# Patient Record
Sex: FEMALE | Race: WHITE | Employment: OTHER | URBAN - METROPOLITAN AREA
[De-identification: names, ages, dates, MRNs, and addresses within clinical notes are randomized per-mention and may not be internally consistent; named-entity substitution may affect disease eponyms.]

---

## 2018-08-27 ENCOUNTER — TELEPHONE (OUTPATIENT)
Dept: ORTHOPEDIC SURGERY | Age: 83
End: 2018-08-27

## 2018-09-04 ENCOUNTER — OFFICE VISIT (OUTPATIENT)
Dept: ORTHOPEDIC SURGERY | Age: 83
End: 2018-09-04

## 2018-09-04 VITALS
DIASTOLIC BLOOD PRESSURE: 59 MMHG | SYSTOLIC BLOOD PRESSURE: 117 MMHG | HEIGHT: 62 IN | WEIGHT: 164 LBS | BODY MASS INDEX: 30.18 KG/M2 | HEART RATE: 60 BPM

## 2018-09-04 DIAGNOSIS — M25.571 ACUTE RIGHT ANKLE PAIN: Primary | ICD-10-CM

## 2018-09-04 PROCEDURE — 99203 OFFICE O/P NEW LOW 30 MIN: CPT | Performed by: ORTHOPAEDIC SURGERY

## 2018-09-04 NOTE — PROGRESS NOTES
Lower Extremity: Examination of the left lower extremity does not show any tenderness, deformity or injury. Range of motion is unremarkable. There is no gross instability. There are no rashes, ulcerations or lesions. Strength and tone are normal.    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 Impression:  Encounter Diagnosis   Name Primary?  Acute right ankle pain Yes       Office Procedures:  Orders Placed This Encounter   Procedures    XR ANKLE RIGHT (MIN 3 VIEWS)       Treatment Plan:  I spent 30 minutes with this patient and her  greater than 50% of time face-to-face discussing treatment options. Operative option would be very large and would involve a tibial talocalcaneal fusion. She does not want to consider surgery and her only options from my standpoint would be to use some type of custom AFO or double upright brace which can be somewhat heavy and use this just for transfers and ambulating short distances. I gave her prescription to see Brandi Thomas and I also gave her prescription for physical therapy.   Follow-up with me as needed

## 2018-09-07 ENCOUNTER — ORTHOTIC/BRACE ENCOUNTER (OUTPATIENT)
Dept: ORTHOPEDIC SURGERY | Age: 83
End: 2018-09-07

## 2018-09-21 ENCOUNTER — ORTHOTIC/BRACE ENCOUNTER (OUTPATIENT)
Dept: ORTHOPEDIC SURGERY | Age: 83
End: 2018-09-21

## 2018-09-21 DIAGNOSIS — M13.871 ALLERGIC ARTHRITIS OF RIGHT ANKLE: ICD-10-CM

## 2018-09-21 DIAGNOSIS — M25.571 ACUTE RIGHT ANKLE PAIN: Primary | ICD-10-CM

## 2018-09-21 PROCEDURE — L2820 SOFT INTERFACE BELOW KNEE SE: HCPCS | Performed by: ORTHOPAEDIC SURGERY

## 2018-09-21 PROCEDURE — L2275 PLASTIC MOD LOW EXT PAD/LINE: HCPCS | Performed by: PEDORTHIST

## 2018-09-21 PROCEDURE — L1960 AFO POS SOLID ANK PLASTIC MO: HCPCS | Performed by: PEDORTHIST

## 2018-09-21 PROCEDURE — L1970 AFO PLASTIC MOLDED W/ANKLE J: HCPCS | Performed by: ORTHOPAEDIC SURGERY

## 2018-09-21 NOTE — PROGRESS NOTES
Dispensed right brace with insert and lateral flare on her shoe. It fits and functions well. Gave wear instructions. She will follow up if having any problems.

## 2018-10-16 ENCOUNTER — OFFICE VISIT (OUTPATIENT)
Dept: ORTHOPEDIC SURGERY | Age: 83
End: 2018-10-16
Payer: MEDICARE

## 2018-10-16 VITALS — BODY MASS INDEX: 30.18 KG/M2 | HEIGHT: 62 IN | WEIGHT: 164.02 LBS

## 2018-10-16 DIAGNOSIS — M25.571 ACUTE RIGHT ANKLE PAIN: Primary | ICD-10-CM

## 2018-10-16 PROCEDURE — L1902 AFO ANKLE GAUNTLET PRE OTS: HCPCS | Performed by: ORTHOPAEDIC SURGERY

## 2018-10-16 PROCEDURE — MISC90 ORTHOTIC REFURBISHMENT 90: Performed by: ORTHOPAEDIC SURGERY

## 2018-10-16 PROCEDURE — 99212 OFFICE O/P EST SF 10 MIN: CPT | Performed by: ORTHOPAEDIC SURGERY

## 2018-11-02 ENCOUNTER — ORTHOTIC/BRACE ENCOUNTER (OUTPATIENT)
Dept: ORTHOPEDIC SURGERY | Age: 83
End: 2018-11-02

## 2018-11-06 ENCOUNTER — HOSPITAL ENCOUNTER (INPATIENT)
Age: 83
LOS: 2 days | Discharge: HOME | DRG: 379 | End: 2018-11-08
Attending: EMERGENCY MEDICINE
Payer: MEDICARE

## 2018-11-06 DIAGNOSIS — K92.2 GASTROINTESTINAL HEMORRHAGE, UNSPECIFIED GASTROINTESTINAL HEMORRHAGE TYPE: Primary | ICD-10-CM

## 2018-11-06 LAB
A/G RATIO: 1.3 (ref 1.1–2.2)
ABO/RH: NORMAL
ALBUMIN SERPL-MCNC: 3.7 G/DL (ref 3.4–5)
ALP BLD-CCNC: 103 U/L (ref 40–129)
ALT SERPL-CCNC: 31 U/L (ref 10–40)
ANION GAP SERPL CALCULATED.3IONS-SCNC: 7 MMOL/L (ref 3–16)
ANTIBODY SCREEN: NORMAL
AST SERPL-CCNC: 34 U/L (ref 15–37)
BASOPHILS ABSOLUTE: 0.1 K/UL (ref 0–0.2)
BASOPHILS RELATIVE PERCENT: 1 %
BILIRUB SERPL-MCNC: 0.4 MG/DL (ref 0–1)
BUN BLDV-MCNC: 22 MG/DL (ref 7–20)
CALCIUM SERPL-MCNC: 9.4 MG/DL (ref 8.3–10.6)
CHLORIDE BLD-SCNC: 103 MMOL/L (ref 99–110)
CO2: 31 MMOL/L (ref 21–32)
CREAT SERPL-MCNC: <0.5 MG/DL (ref 0.6–1.2)
EOSINOPHILS ABSOLUTE: 0.5 K/UL (ref 0–0.6)
EOSINOPHILS RELATIVE PERCENT: 8 %
FERRITIN: 489.9 NG/ML (ref 15–150)
FOLATE: 19.33 NG/ML (ref 4.78–24.2)
GFR AFRICAN AMERICAN: >60
GFR NON-AFRICAN AMERICAN: >60
GLOBULIN: 2.8 G/DL
GLUCOSE BLD-MCNC: 93 MG/DL (ref 70–99)
HCT VFR BLD CALC: 34.2 % (ref 36–48)
HEMOGLOBIN: 11.3 G/DL (ref 12–16)
IRON SATURATION: 94 % (ref 15–50)
IRON: 198 UG/DL (ref 37–145)
LYMPHOCYTES ABSOLUTE: 0.9 K/UL (ref 1–5.1)
LYMPHOCYTES RELATIVE PERCENT: 14.8 %
MCH RBC QN AUTO: 35.3 PG (ref 26–34)
MCHC RBC AUTO-ENTMCNC: 33.2 G/DL (ref 31–36)
MCV RBC AUTO: 106.3 FL (ref 80–100)
MONOCYTES ABSOLUTE: 0.6 K/UL (ref 0–1.3)
MONOCYTES RELATIVE PERCENT: 10.2 %
NEUTROPHILS ABSOLUTE: 4.1 K/UL (ref 1.7–7.7)
NEUTROPHILS RELATIVE PERCENT: 66 %
OCCULT BLOOD SCREENING: ABNORMAL
PDW BLD-RTO: 13.3 % (ref 12.4–15.4)
PLATELET # BLD: 191 K/UL (ref 135–450)
PMV BLD AUTO: 7.6 FL (ref 5–10.5)
POTASSIUM SERPL-SCNC: 3.9 MMOL/L (ref 3.5–5.1)
RBC # BLD: 3.21 M/UL (ref 4–5.2)
SODIUM BLD-SCNC: 141 MMOL/L (ref 136–145)
TOTAL IRON BINDING CAPACITY: 211 UG/DL (ref 260–445)
TOTAL PROTEIN: 6.5 G/DL (ref 6.4–8.2)
VITAMIN B-12: 611 PG/ML (ref 211–911)
WBC # BLD: 6.1 K/UL (ref 4–11)

## 2018-11-06 PROCEDURE — 96366 THER/PROPH/DIAG IV INF ADDON: CPT

## 2018-11-06 PROCEDURE — 80053 COMPREHEN METABOLIC PANEL: CPT

## 2018-11-06 PROCEDURE — 93010 ELECTROCARDIOGRAM REPORT: CPT | Performed by: INTERNAL MEDICINE

## 2018-11-06 PROCEDURE — 96375 TX/PRO/DX INJ NEW DRUG ADDON: CPT

## 2018-11-06 PROCEDURE — G0378 HOSPITAL OBSERVATION PER HR: HCPCS

## 2018-11-06 PROCEDURE — 86850 RBC ANTIBODY SCREEN: CPT

## 2018-11-06 PROCEDURE — 99284 EMERGENCY DEPT VISIT MOD MDM: CPT

## 2018-11-06 PROCEDURE — G0328 FECAL BLOOD SCRN IMMUNOASSAY: HCPCS

## 2018-11-06 PROCEDURE — 2060000000 HC ICU INTERMEDIATE R&B

## 2018-11-06 PROCEDURE — 83550 IRON BINDING TEST: CPT

## 2018-11-06 PROCEDURE — 85025 COMPLETE CBC W/AUTO DIFF WBC: CPT

## 2018-11-06 PROCEDURE — 6360000002 HC RX W HCPCS: Performed by: INTERNAL MEDICINE

## 2018-11-06 PROCEDURE — 82607 VITAMIN B-12: CPT

## 2018-11-06 PROCEDURE — 2580000003 HC RX 258: Performed by: INTERNAL MEDICINE

## 2018-11-06 PROCEDURE — 82728 ASSAY OF FERRITIN: CPT

## 2018-11-06 PROCEDURE — 86900 BLOOD TYPING SEROLOGIC ABO: CPT

## 2018-11-06 PROCEDURE — C9113 INJ PANTOPRAZOLE SODIUM, VIA: HCPCS | Performed by: INTERNAL MEDICINE

## 2018-11-06 PROCEDURE — 6360000002 HC RX W HCPCS: Performed by: EMERGENCY MEDICINE

## 2018-11-06 PROCEDURE — 93005 ELECTROCARDIOGRAM TRACING: CPT | Performed by: INTERNAL MEDICINE

## 2018-11-06 PROCEDURE — 86901 BLOOD TYPING SEROLOGIC RH(D): CPT

## 2018-11-06 PROCEDURE — C9113 INJ PANTOPRAZOLE SODIUM, VIA: HCPCS | Performed by: EMERGENCY MEDICINE

## 2018-11-06 PROCEDURE — 96374 THER/PROPH/DIAG INJ IV PUSH: CPT

## 2018-11-06 PROCEDURE — 96365 THER/PROPH/DIAG IV INF INIT: CPT

## 2018-11-06 PROCEDURE — 83540 ASSAY OF IRON: CPT

## 2018-11-06 PROCEDURE — 36415 COLL VENOUS BLD VENIPUNCTURE: CPT

## 2018-11-06 PROCEDURE — 82746 ASSAY OF FOLIC ACID SERUM: CPT

## 2018-11-06 RX ORDER — LEVOTHYROXINE SODIUM 0.1 MG/1
100 TABLET ORAL DAILY
COMMUNITY
End: 2019-09-18 | Stop reason: SDUPTHER

## 2018-11-06 RX ORDER — LEFLUNOMIDE 10 MG/1
20 TABLET ORAL DAILY
Status: DISCONTINUED | OUTPATIENT
Start: 2018-11-07 | End: 2018-11-08 | Stop reason: HOSPADM

## 2018-11-06 RX ORDER — ONDANSETRON 2 MG/ML
4 INJECTION INTRAMUSCULAR; INTRAVENOUS EVERY 6 HOURS PRN
Status: DISCONTINUED | OUTPATIENT
Start: 2018-11-06 | End: 2018-11-08 | Stop reason: HOSPADM

## 2018-11-06 RX ORDER — LEFLUNOMIDE 20 MG/1
20 TABLET ORAL DAILY
COMMUNITY

## 2018-11-06 RX ORDER — ATENOLOL 25 MG/1
25 TABLET ORAL DAILY
COMMUNITY
End: 2019-06-25 | Stop reason: DRUGHIGH

## 2018-11-06 RX ORDER — SULFASALAZINE 500 MG/1
500 TABLET ORAL 2 TIMES DAILY
COMMUNITY
End: 2019-06-25 | Stop reason: DRUGHIGH

## 2018-11-06 RX ORDER — LEVOTHYROXINE SODIUM 0.1 MG/1
100 TABLET ORAL DAILY
Status: DISCONTINUED | OUTPATIENT
Start: 2018-11-07 | End: 2018-11-08 | Stop reason: HOSPADM

## 2018-11-06 RX ORDER — HYDROCODONE BITARTRATE AND ACETAMINOPHEN 5; 325 MG/1; MG/1
1 TABLET ORAL 2 TIMES DAILY PRN
Status: ON HOLD | COMMUNITY
End: 2019-06-03 | Stop reason: HOSPADM

## 2018-11-06 RX ORDER — PANTOPRAZOLE SODIUM 40 MG/10ML
40 INJECTION, POWDER, LYOPHILIZED, FOR SOLUTION INTRAVENOUS ONCE
Status: COMPLETED | OUTPATIENT
Start: 2018-11-06 | End: 2018-11-06

## 2018-11-06 RX ORDER — ACETAMINOPHEN 325 MG/1
650 TABLET ORAL EVERY 4 HOURS PRN
Status: DISCONTINUED | OUTPATIENT
Start: 2018-11-06 | End: 2018-11-08 | Stop reason: HOSPADM

## 2018-11-06 RX ORDER — SODIUM CHLORIDE 0.9 % (FLUSH) 0.9 %
10 SYRINGE (ML) INJECTION PRN
Status: DISCONTINUED | OUTPATIENT
Start: 2018-11-06 | End: 2018-11-08 | Stop reason: HOSPADM

## 2018-11-06 RX ORDER — SODIUM CHLORIDE 9 MG/ML
INJECTION, SOLUTION INTRAVENOUS CONTINUOUS
Status: DISCONTINUED | OUTPATIENT
Start: 2018-11-06 | End: 2018-11-08

## 2018-11-06 RX ORDER — SODIUM CHLORIDE 0.9 % (FLUSH) 0.9 %
10 SYRINGE (ML) INJECTION EVERY 12 HOURS SCHEDULED
Status: DISCONTINUED | OUTPATIENT
Start: 2018-11-06 | End: 2018-11-08 | Stop reason: HOSPADM

## 2018-11-06 RX ORDER — OMEPRAZOLE 20 MG/1
20 CAPSULE, DELAYED RELEASE ORAL DAILY
Status: ON HOLD | COMMUNITY
End: 2018-11-08 | Stop reason: HOSPADM

## 2018-11-06 RX ORDER — HYDROCODONE BITARTRATE AND ACETAMINOPHEN 5; 325 MG/1; MG/1
1 TABLET ORAL EVERY 6 HOURS PRN
Status: DISCONTINUED | OUTPATIENT
Start: 2018-11-06 | End: 2018-11-08 | Stop reason: HOSPADM

## 2018-11-06 RX ORDER — LOSARTAN POTASSIUM 50 MG/1
50 TABLET ORAL DAILY
Status: ON HOLD | COMMUNITY
End: 2019-06-03 | Stop reason: HOSPADM

## 2018-11-06 RX ADMIN — PANTOPRAZOLE SODIUM 40 MG: 40 INJECTION, POWDER, FOR SOLUTION INTRAVENOUS at 15:14

## 2018-11-06 RX ADMIN — SODIUM CHLORIDE: 9 INJECTION, SOLUTION INTRAVENOUS at 22:14

## 2018-11-06 RX ADMIN — SODIUM CHLORIDE 8 MG/HR: 9 INJECTION, SOLUTION INTRAVENOUS at 16:00

## 2018-11-06 ASSESSMENT — ENCOUNTER SYMPTOMS
WHEEZING: 0
HEMATOCHEZIA: 1
VOICE CHANGE: 0
ABDOMINAL PAIN: 0
VOMITING: 0
TROUBLE SWALLOWING: 0
COLOR CHANGE: 0
STRIDOR: 0
FACIAL SWELLING: 0
SHORTNESS OF BREATH: 0
NAUSEA: 0

## 2018-11-06 ASSESSMENT — PAIN SCALES - GENERAL: PAINLEVEL_OUTOF10: 0

## 2018-11-07 LAB
ANION GAP SERPL CALCULATED.3IONS-SCNC: 8 MMOL/L (ref 3–16)
BASOPHILS ABSOLUTE: 0.1 K/UL (ref 0–0.2)
BASOPHILS RELATIVE PERCENT: 1 %
BUN BLDV-MCNC: 15 MG/DL (ref 7–20)
CALCIUM SERPL-MCNC: 8.4 MG/DL (ref 8.3–10.6)
CHLORIDE BLD-SCNC: 103 MMOL/L (ref 99–110)
CO2: 28 MMOL/L (ref 21–32)
CREAT SERPL-MCNC: <0.5 MG/DL (ref 0.6–1.2)
EOSINOPHILS ABSOLUTE: 0.6 K/UL (ref 0–0.6)
EOSINOPHILS RELATIVE PERCENT: 10.4 %
FIBRINOGEN: 269 MG/DL (ref 200–397)
GFR AFRICAN AMERICAN: >60
GFR NON-AFRICAN AMERICAN: >60
GLUCOSE BLD-MCNC: 91 MG/DL (ref 70–99)
HCT VFR BLD CALC: 29.5 % (ref 36–48)
HCT VFR BLD CALC: 32.9 % (ref 36–48)
HEMOGLOBIN: 11 G/DL (ref 12–16)
HEMOGLOBIN: 9.9 G/DL (ref 12–16)
LYMPHOCYTES ABSOLUTE: 1.1 K/UL (ref 1–5.1)
LYMPHOCYTES RELATIVE PERCENT: 18.7 %
MCH RBC QN AUTO: 35.8 PG (ref 26–34)
MCHC RBC AUTO-ENTMCNC: 33.5 G/DL (ref 31–36)
MCV RBC AUTO: 107.1 FL (ref 80–100)
MONOCYTES ABSOLUTE: 0.6 K/UL (ref 0–1.3)
MONOCYTES RELATIVE PERCENT: 11.1 %
NEUTROPHILS ABSOLUTE: 3.4 K/UL (ref 1.7–7.7)
NEUTROPHILS RELATIVE PERCENT: 58.8 %
PDW BLD-RTO: 13.5 % (ref 12.4–15.4)
PLATELET # BLD: 161 K/UL (ref 135–450)
PMV BLD AUTO: 7.8 FL (ref 5–10.5)
POTASSIUM REFLEX MAGNESIUM: 3.7 MMOL/L (ref 3.5–5.1)
RBC # BLD: 2.75 M/UL (ref 4–5.2)
SODIUM BLD-SCNC: 139 MMOL/L (ref 136–145)
WBC # BLD: 5.8 K/UL (ref 4–11)

## 2018-11-07 PROCEDURE — 2700000000 HC OXYGEN THERAPY PER DAY

## 2018-11-07 PROCEDURE — 7100000010 HC PHASE II RECOVERY - FIRST 15 MIN: Performed by: INTERNAL MEDICINE

## 2018-11-07 PROCEDURE — C9113 INJ PANTOPRAZOLE SODIUM, VIA: HCPCS | Performed by: INTERNAL MEDICINE

## 2018-11-07 PROCEDURE — 85384 FIBRINOGEN ACTIVITY: CPT

## 2018-11-07 PROCEDURE — 88305 TISSUE EXAM BY PATHOLOGIST: CPT

## 2018-11-07 PROCEDURE — 96366 THER/PROPH/DIAG IV INF ADDON: CPT

## 2018-11-07 PROCEDURE — 6370000000 HC RX 637 (ALT 250 FOR IP): Performed by: INTERNAL MEDICINE

## 2018-11-07 PROCEDURE — 7100000011 HC PHASE II RECOVERY - ADDTL 15 MIN: Performed by: INTERNAL MEDICINE

## 2018-11-07 PROCEDURE — 2709999900 HC NON-CHARGEABLE SUPPLY: Performed by: INTERNAL MEDICINE

## 2018-11-07 PROCEDURE — 85025 COMPLETE CBC W/AUTO DIFF WBC: CPT

## 2018-11-07 PROCEDURE — 3609017100 HC EGD: Performed by: INTERNAL MEDICINE

## 2018-11-07 PROCEDURE — 85018 HEMOGLOBIN: CPT

## 2018-11-07 PROCEDURE — G0378 HOSPITAL OBSERVATION PER HR: HCPCS

## 2018-11-07 PROCEDURE — 88342 IMHCHEM/IMCYTCHM 1ST ANTB: CPT

## 2018-11-07 PROCEDURE — 0DB78ZX EXCISION OF STOMACH, PYLORUS, VIA NATURAL OR ARTIFICIAL OPENING ENDOSCOPIC, DIAGNOSTIC: ICD-10-PCS | Performed by: INTERNAL MEDICINE

## 2018-11-07 PROCEDURE — 36415 COLL VENOUS BLD VENIPUNCTURE: CPT

## 2018-11-07 PROCEDURE — 80048 BASIC METABOLIC PNL TOTAL CA: CPT

## 2018-11-07 PROCEDURE — 6360000002 HC RX W HCPCS: Performed by: INTERNAL MEDICINE

## 2018-11-07 PROCEDURE — 2580000003 HC RX 258: Performed by: INTERNAL MEDICINE

## 2018-11-07 PROCEDURE — 85014 HEMATOCRIT: CPT

## 2018-11-07 PROCEDURE — 1200000000 HC SEMI PRIVATE

## 2018-11-07 PROCEDURE — 6370000000 HC RX 637 (ALT 250 FOR IP): Performed by: NURSE PRACTITIONER

## 2018-11-07 PROCEDURE — 99152 MOD SED SAME PHYS/QHP 5/>YRS: CPT | Performed by: INTERNAL MEDICINE

## 2018-11-07 PROCEDURE — 94761 N-INVAS EAR/PLS OXIMETRY MLT: CPT

## 2018-11-07 RX ORDER — MIDAZOLAM HYDROCHLORIDE 5 MG/ML
INJECTION INTRAMUSCULAR; INTRAVENOUS PRN
Status: DISCONTINUED | OUTPATIENT
Start: 2018-11-07 | End: 2018-11-07 | Stop reason: HOSPADM

## 2018-11-07 RX ORDER — ZOLPIDEM TARTRATE 5 MG/1
5 TABLET ORAL NIGHTLY PRN
Status: DISCONTINUED | OUTPATIENT
Start: 2018-11-07 | End: 2018-11-08 | Stop reason: HOSPADM

## 2018-11-07 RX ORDER — PANTOPRAZOLE SODIUM 40 MG/1
40 TABLET, DELAYED RELEASE ORAL
Status: DISCONTINUED | OUTPATIENT
Start: 2018-11-07 | End: 2018-11-08 | Stop reason: HOSPADM

## 2018-11-07 RX ORDER — FENTANYL CITRATE 50 UG/ML
INJECTION, SOLUTION INTRAMUSCULAR; INTRAVENOUS PRN
Status: DISCONTINUED | OUTPATIENT
Start: 2018-11-07 | End: 2018-11-07 | Stop reason: HOSPADM

## 2018-11-07 RX ADMIN — LEVOTHYROXINE SODIUM 100 MCG: 100 TABLET ORAL at 05:13

## 2018-11-07 RX ADMIN — SODIUM CHLORIDE 8 MG/HR: 9 INJECTION, SOLUTION INTRAVENOUS at 05:29

## 2018-11-07 RX ADMIN — LEFLUNOMIDE 20 MG: 10 TABLET ORAL at 10:59

## 2018-11-07 RX ADMIN — PANTOPRAZOLE SODIUM 40 MG: 40 TABLET, DELAYED RELEASE ORAL at 17:10

## 2018-11-07 RX ADMIN — ZOLPIDEM TARTRATE 5 MG: 5 TABLET ORAL at 22:42

## 2018-11-07 RX ADMIN — Medication 2 MG: at 02:01

## 2018-11-07 ASSESSMENT — PAIN SCALES - GENERAL
PAINLEVEL_OUTOF10: 0

## 2018-11-08 VITALS
SYSTOLIC BLOOD PRESSURE: 152 MMHG | HEART RATE: 63 BPM | RESPIRATION RATE: 16 BRPM | TEMPERATURE: 98.2 F | DIASTOLIC BLOOD PRESSURE: 77 MMHG | HEIGHT: 62 IN | BODY MASS INDEX: 29.77 KG/M2 | OXYGEN SATURATION: 92 % | WEIGHT: 161.8 LBS

## 2018-11-08 LAB
BASOPHILS ABSOLUTE: 0.1 K/UL (ref 0–0.2)
BASOPHILS RELATIVE PERCENT: 1.2 %
EOSINOPHILS ABSOLUTE: 0.6 K/UL (ref 0–0.6)
EOSINOPHILS RELATIVE PERCENT: 13.3 %
HCT VFR BLD CALC: 29.2 % (ref 36–48)
HEMOGLOBIN: 9.7 G/DL (ref 12–16)
LYMPHOCYTES ABSOLUTE: 1.1 K/UL (ref 1–5.1)
LYMPHOCYTES RELATIVE PERCENT: 24.3 %
MCH RBC QN AUTO: 35.6 PG (ref 26–34)
MCHC RBC AUTO-ENTMCNC: 33.1 G/DL (ref 31–36)
MCV RBC AUTO: 107.6 FL (ref 80–100)
MONOCYTES ABSOLUTE: 0.6 K/UL (ref 0–1.3)
MONOCYTES RELATIVE PERCENT: 14.3 %
NEUTROPHILS ABSOLUTE: 2 K/UL (ref 1.7–7.7)
NEUTROPHILS RELATIVE PERCENT: 46.9 %
PDW BLD-RTO: 13.7 % (ref 12.4–15.4)
PLATELET # BLD: 153 K/UL (ref 135–450)
PMV BLD AUTO: 8.1 FL (ref 5–10.5)
RBC # BLD: 2.71 M/UL (ref 4–5.2)
WBC # BLD: 4.3 K/UL (ref 4–11)

## 2018-11-08 PROCEDURE — 6370000000 HC RX 637 (ALT 250 FOR IP): Performed by: INTERNAL MEDICINE

## 2018-11-08 PROCEDURE — G0378 HOSPITAL OBSERVATION PER HR: HCPCS

## 2018-11-08 PROCEDURE — 85025 COMPLETE CBC W/AUTO DIFF WBC: CPT

## 2018-11-08 PROCEDURE — 36415 COLL VENOUS BLD VENIPUNCTURE: CPT

## 2018-11-08 RX ORDER — LOSARTAN POTASSIUM 25 MG/1
50 TABLET ORAL DAILY
Status: DISCONTINUED | OUTPATIENT
Start: 2018-11-08 | End: 2018-11-08 | Stop reason: HOSPADM

## 2018-11-08 RX ORDER — ATENOLOL 25 MG/1
25 TABLET ORAL DAILY
Status: DISCONTINUED | OUTPATIENT
Start: 2018-11-08 | End: 2018-11-08 | Stop reason: HOSPADM

## 2018-11-08 RX ORDER — PANTOPRAZOLE SODIUM 40 MG/1
40 TABLET, DELAYED RELEASE ORAL
Qty: 60 TABLET | Refills: 3 | Status: SHIPPED | OUTPATIENT
Start: 2018-11-08 | End: 2022-07-26 | Stop reason: SDUPTHER

## 2018-11-08 RX ADMIN — LEFLUNOMIDE 20 MG: 10 TABLET ORAL at 08:38

## 2018-11-08 RX ADMIN — ACETAMINOPHEN 650 MG: 325 TABLET ORAL at 12:19

## 2018-11-08 RX ADMIN — ATENOLOL 25 MG: 25 TABLET ORAL at 08:37

## 2018-11-08 RX ADMIN — LOSARTAN POTASSIUM 50 MG: 25 TABLET, FILM COATED ORAL at 12:18

## 2018-11-08 RX ADMIN — LEVOTHYROXINE SODIUM 100 MCG: 100 TABLET ORAL at 05:58

## 2018-11-08 RX ADMIN — PANTOPRAZOLE SODIUM 40 MG: 40 TABLET, DELAYED RELEASE ORAL at 05:58

## 2018-11-08 ASSESSMENT — PAIN SCALES - GENERAL
PAINLEVEL_OUTOF10: 5
PAINLEVEL_OUTOF10: 0
PAINLEVEL_OUTOF10: 5

## 2018-11-09 LAB
EKG ATRIAL RATE: 64 BPM
EKG DIAGNOSIS: NORMAL
EKG P AXIS: 56 DEGREES
EKG P-R INTERVAL: 208 MS
EKG Q-T INTERVAL: 438 MS
EKG QRS DURATION: 130 MS
EKG QTC CALCULATION (BAZETT): 451 MS
EKG R AXIS: -50 DEGREES
EKG T AXIS: 71 DEGREES
EKG VENTRICULAR RATE: 64 BPM

## 2019-01-04 ENCOUNTER — ORTHOTIC/BRACE ENCOUNTER (OUTPATIENT)
Dept: ORTHOPEDIC SURGERY | Age: 84
End: 2019-01-04

## 2019-05-27 ENCOUNTER — HOSPITAL ENCOUNTER (INPATIENT)
Age: 84
LOS: 6 days | Discharge: NURSING, REHAB OR LONG-TERM CARE | DRG: 301 | End: 2019-06-04
Attending: EMERGENCY MEDICINE | Admitting: INTERNAL MEDICINE
Payer: MEDICARE

## 2019-05-27 DIAGNOSIS — R26.2 CANNOT WALK: ICD-10-CM

## 2019-05-27 DIAGNOSIS — R79.89 ELEVATED D-DIMER: ICD-10-CM

## 2019-05-27 DIAGNOSIS — M19.90 ARTHRITIS: ICD-10-CM

## 2019-05-27 DIAGNOSIS — E07.9 THYROID DISEASE: ICD-10-CM

## 2019-05-27 DIAGNOSIS — M79.89 LEG SWELLING: Primary | ICD-10-CM

## 2019-05-27 PROBLEM — R26.9 GAIT DISTURBANCE: Status: ACTIVE | Noted: 2019-05-27

## 2019-05-27 LAB
A/G RATIO: 1.3 (ref 1.1–2.2)
ALBUMIN SERPL-MCNC: 3.7 G/DL (ref 3.4–5)
ALP BLD-CCNC: 97 U/L (ref 40–129)
ALT SERPL-CCNC: 15 U/L (ref 10–40)
ANION GAP SERPL CALCULATED.3IONS-SCNC: 10 MMOL/L (ref 3–16)
APTT: 29.9 SEC (ref 26–36)
AST SERPL-CCNC: 25 U/L (ref 15–37)
BASOPHILS ABSOLUTE: 0.1 K/UL (ref 0–0.2)
BASOPHILS RELATIVE PERCENT: 1.1 %
BILIRUB SERPL-MCNC: 0.5 MG/DL (ref 0–1)
BUN BLDV-MCNC: 17 MG/DL (ref 7–20)
CALCIUM SERPL-MCNC: 9.1 MG/DL (ref 8.3–10.6)
CHLORIDE BLD-SCNC: 103 MMOL/L (ref 99–110)
CO2: 28 MMOL/L (ref 21–32)
CREAT SERPL-MCNC: <0.5 MG/DL (ref 0.6–1.2)
D DIMER: 4075 NG/ML DDU (ref 0–229)
EOSINOPHILS ABSOLUTE: 0.4 K/UL (ref 0–0.6)
EOSINOPHILS RELATIVE PERCENT: 7.3 %
GFR AFRICAN AMERICAN: >60
GFR NON-AFRICAN AMERICAN: >60
GLOBULIN: 2.8 G/DL
GLUCOSE BLD-MCNC: 106 MG/DL (ref 70–99)
HCT VFR BLD CALC: 34.3 % (ref 36–48)
HEMOGLOBIN: 11.3 G/DL (ref 12–16)
INR BLD: 1.02 (ref 0.86–1.14)
LYMPHOCYTES ABSOLUTE: 0.7 K/UL (ref 1–5.1)
LYMPHOCYTES RELATIVE PERCENT: 11.4 %
MCH RBC QN AUTO: 35.7 PG (ref 26–34)
MCHC RBC AUTO-ENTMCNC: 33.1 G/DL (ref 31–36)
MCV RBC AUTO: 108.1 FL (ref 80–100)
MONOCYTES ABSOLUTE: 0.6 K/UL (ref 0–1.3)
MONOCYTES RELATIVE PERCENT: 10.4 %
NEUTROPHILS ABSOLUTE: 4.1 K/UL (ref 1.7–7.7)
NEUTROPHILS RELATIVE PERCENT: 69.8 %
PDW BLD-RTO: 14.2 % (ref 12.4–15.4)
PLATELET # BLD: 167 K/UL (ref 135–450)
PMV BLD AUTO: 7.3 FL (ref 5–10.5)
POTASSIUM SERPL-SCNC: 4 MMOL/L (ref 3.5–5.1)
PROTHROMBIN TIME: 11.6 SEC (ref 9.8–13)
RBC # BLD: 3.17 M/UL (ref 4–5.2)
SODIUM BLD-SCNC: 141 MMOL/L (ref 136–145)
TOTAL PROTEIN: 6.5 G/DL (ref 6.4–8.2)
WBC # BLD: 5.9 K/UL (ref 4–11)

## 2019-05-27 PROCEDURE — 80053 COMPREHEN METABOLIC PANEL: CPT

## 2019-05-27 PROCEDURE — 85610 PROTHROMBIN TIME: CPT

## 2019-05-27 PROCEDURE — 99284 EMERGENCY DEPT VISIT MOD MDM: CPT

## 2019-05-27 PROCEDURE — G0378 HOSPITAL OBSERVATION PER HR: HCPCS

## 2019-05-27 PROCEDURE — 85379 FIBRIN DEGRADATION QUANT: CPT

## 2019-05-27 PROCEDURE — 85025 COMPLETE CBC W/AUTO DIFF WBC: CPT

## 2019-05-27 PROCEDURE — 85730 THROMBOPLASTIN TIME PARTIAL: CPT

## 2019-05-27 PROCEDURE — 6370000000 HC RX 637 (ALT 250 FOR IP): Performed by: EMERGENCY MEDICINE

## 2019-05-27 RX ORDER — SODIUM CHLORIDE 0.9 % (FLUSH) 0.9 %
10 SYRINGE (ML) INJECTION PRN
Status: DISCONTINUED | OUTPATIENT
Start: 2019-05-27 | End: 2019-06-04 | Stop reason: HOSPADM

## 2019-05-27 RX ORDER — ZOLPIDEM TARTRATE 10 MG/1
10 TABLET ORAL NIGHTLY PRN
Status: ON HOLD | COMMUNITY
End: 2019-06-03 | Stop reason: HOSPADM

## 2019-05-27 RX ORDER — ONDANSETRON 2 MG/ML
4 INJECTION INTRAMUSCULAR; INTRAVENOUS EVERY 6 HOURS PRN
Status: DISCONTINUED | OUTPATIENT
Start: 2019-05-27 | End: 2019-06-04 | Stop reason: HOSPADM

## 2019-05-27 RX ORDER — ASPIRIN 81 MG/1
81 TABLET ORAL DAILY
Status: DISCONTINUED | OUTPATIENT
Start: 2019-05-28 | End: 2019-06-04 | Stop reason: HOSPADM

## 2019-05-27 RX ORDER — SODIUM CHLORIDE 0.9 % (FLUSH) 0.9 %
10 SYRINGE (ML) INJECTION EVERY 12 HOURS SCHEDULED
Status: DISCONTINUED | OUTPATIENT
Start: 2019-05-28 | End: 2019-06-04 | Stop reason: HOSPADM

## 2019-05-27 RX ORDER — PANTOPRAZOLE SODIUM 40 MG/1
40 TABLET, DELAYED RELEASE ORAL DAILY
Status: DISCONTINUED | OUTPATIENT
Start: 2019-05-28 | End: 2019-06-04 | Stop reason: HOSPADM

## 2019-05-27 RX ORDER — LEFLUNOMIDE 10 MG/1
20 TABLET ORAL DAILY
Status: DISCONTINUED | OUTPATIENT
Start: 2019-05-28 | End: 2019-06-04 | Stop reason: HOSPADM

## 2019-05-27 RX ORDER — SULFASALAZINE 500 MG/1
500 TABLET ORAL 2 TIMES DAILY
Status: DISCONTINUED | OUTPATIENT
Start: 2019-05-28 | End: 2019-06-04 | Stop reason: HOSPADM

## 2019-05-27 RX ORDER — LEVOTHYROXINE SODIUM 0.1 MG/1
100 TABLET ORAL DAILY
Status: DISCONTINUED | OUTPATIENT
Start: 2019-05-28 | End: 2019-06-04 | Stop reason: HOSPADM

## 2019-05-27 RX ORDER — ATENOLOL 25 MG/1
25 TABLET ORAL DAILY
Status: DISCONTINUED | OUTPATIENT
Start: 2019-05-28 | End: 2019-05-30

## 2019-05-27 RX ORDER — ACETAMINOPHEN 500 MG
500 TABLET ORAL EVERY 6 HOURS PRN
Status: DISCONTINUED | OUTPATIENT
Start: 2019-05-27 | End: 2019-06-04 | Stop reason: HOSPADM

## 2019-05-27 RX ORDER — HYDROCODONE BITARTRATE AND ACETAMINOPHEN 5; 325 MG/1; MG/1
1 TABLET ORAL 2 TIMES DAILY PRN
Status: DISCONTINUED | OUTPATIENT
Start: 2019-05-27 | End: 2019-06-04 | Stop reason: HOSPADM

## 2019-05-27 RX ORDER — LOSARTAN POTASSIUM 25 MG/1
50 TABLET ORAL DAILY
Status: DISCONTINUED | OUTPATIENT
Start: 2019-05-28 | End: 2019-06-04 | Stop reason: HOSPADM

## 2019-05-27 RX ADMIN — APIXABAN 10 MG: 5 TABLET, FILM COATED ORAL at 23:06

## 2019-05-27 ASSESSMENT — ENCOUNTER SYMPTOMS
DIARRHEA: 0
COUGH: 0
ABDOMINAL PAIN: 0
NAUSEA: 0
SHORTNESS OF BREATH: 0
WHEEZING: 0
BACK PAIN: 0
COLOR CHANGE: 0
VOMITING: 0

## 2019-05-27 ASSESSMENT — PAIN SCALES - GENERAL: PAINLEVEL_OUTOF10: 0

## 2019-05-27 NOTE — ED PROVIDER NOTES
11/7/2018    EGD ESOPHAGOGASTRODUODENOSCOPY performed by Gisselle Narayanan MD at [facility]       Medications:  Previous Medications    ASPIRIN 81 MG TABLET    Take 81 mg by mouth daily    ATENOLOL (TENORMIN) 25 MG TABLET    Take 25 mg by mouth daily    HYDROCODONE-ACETAMINOPHEN (NORCO) 5-325 MG PER TABLET    Take 1 tablet by mouth 2 times daily as needed for Pain. Maegan Never LEFLUNOMIDE (ARAVA) 20 MG TABLET    Take 20 mg by mouth daily    LEVOTHYROXINE (SYNTHROID) 100 MCG TABLET    Take 100 mcg by mouth Daily    LOSARTAN (COZAAR) 50 MG TABLET    Take 50 mg by mouth daily    PANTOPRAZOLE (PROTONIX) 40 MG TABLET    Take 1 tablet by mouth 2 times daily (before meals)    SULFASALAZINE (AZULFIDINE) 500 MG TABLET    Take 500 mg by mouth 2 times daily    ZOLPIDEM (AMBIEN) 10 MG TABLET    Take 10 mg by mouth nightly as needed for Sleep. Review of Systems:  Review of Systems   Constitutional: Negative for chills and fever. HENT: Negative for congestion. Respiratory: Negative for cough, shortness of breath and wheezing. Cardiovascular: Positive for leg swelling. Negative for chest pain. Patient complains of bilateral leg swelling, denies any chest pain pleuritic chest pain or shortness of breath. She has been dealing with the swelling since Saturday   Gastrointestinal: Negative for abdominal pain, diarrhea, nausea and vomiting. Genitourinary: Negative for difficulty urinating and dysuria. Musculoskeletal: Negative for back pain. Skin: Negative for color change. Neurological: Negative for weakness, numbness and headaches. Positives and Pertinent negatives as per HPI. Except as noted above in the ROS, problem specific ROS was completed and is negative. Physical Exam:  Physical Exam   Constitutional: She is oriented to person, place, and time. She appears well-developed and well-nourished. HENT:   Head: Normocephalic.    Right Ear: External ear normal.   Left Ear: External ear normal. Mouth/Throat: Oropharynx is clear and moist.   Eyes: Right eye exhibits no discharge. Left eye exhibits no discharge. No scleral icterus. Neck: Normal range of motion. Neck supple. Cardiovascular: Normal rate. Patient is from muscle and into, peripheral pulses are 2+ however there 1+ palpable and she has 2+ pitting edema from her foot up into the knee, no open wounds, erythema or warmth or signs of cellulitis in the left leg. Compartments and left leg are soft. No bony deformity or skin tenting. Pulmonary/Chest: Effort normal. No respiratory distress. Airway patent with symmetric rise and fall chest, lungs are clear anteriorly, diminished posteriorly in the bases, patient is not tachypneic or dyspneic and saturations are 95% on room air   Abdominal: Soft. There is no tenderness. Musculoskeletal: Normal range of motion. Neurological: She is alert and oriented to person, place, and time. GCS eye subscore is 4. GCS verbal subscore is 5. GCS motor subscore is 6. Skin: Skin is warm. Capillary refill takes less than 2 seconds. She is not diaphoretic. No pallor. Psychiatric: She has a normal mood and affect. Her behavior is normal.   Nursing note and vitals reviewed.       MEDICAL DECISION MAKING    Vitals:    Vitals:    05/27/19 1851 05/27/19 2129 05/27/19 2236   BP: 139/65 (!) 164/64 (!) 149/67   Pulse: 78 83    Resp: 16 16 18   Temp: 99.2 °F (37.3 °C)  98.7 °F (37.1 °C)   TempSrc: Oral  Oral   SpO2: 93% 96% 97%   Weight: 160 lb (72.6 kg)     Height: 5' 2\" (1.575 m)         LABS:  Labs Reviewed   CBC WITH AUTO DIFFERENTIAL - Abnormal; Notable for the following components:       Result Value    RBC 3.17 (*)     Hemoglobin 11.3 (*)     Hematocrit 34.3 (*)     .1 (*)     MCH 35.7 (*)     Lymphocytes # 0.7 (*)     All other components within normal limits    Narrative:     Performed at:  [address],  [place], [place]   Phone [phone] COMPREHENSIVE METABOLIC PANEL - Abnormal; Notable for the following components:    Glucose 106 (*)     CREATININE <0.5 (*)     All other components within normal limits    Narrative:     Performed at:  [address], [facility]   Phone [phone]   D-DIMER, QUANTITATIVE - Abnormal; Notable for the following components:    D-Dimer, Quant 4075 (*)     All other components within normal limits    Narrative:     Performed at:  [address], [facility] [employer] [facility]   Phone [phone]   PROTIME-INR    Narrative:     Performed at:  [address], [facility]   Phone [phone]   APTT    Narrative:     Performed at:  [address], [facility]   Phone [phone]   URINE RT REFLEX TO CULTURE        Remainder of labs reviewed and werenegative at this time or not returned at the time of this note. RADIOLOGY:   Non-plain film images such as CT, Ultrasound and MRI are read by the radiologist. Ramandeep GARCIA APRN - CNP have directly visualized the radiologic plain film image(s) with the below findings:        Interpretation per the Radiologist below, if available at the time of thisnote:    No orders to display        No results found. MEDICAL DECISION MAKING / ED COURSE:      PROCEDURES:   Procedures    None    Patient was given:  Medications   apixaban (ELIQUIS) tablet 10 mg (10 mg Oral Given 5/27/19 2306)     She's been dealing with the left leg swelling from the knee down since Saturday, denies any falls from his or injuries. Patient states that she and her family were concerned she had a blood clot.   I had a long conversation with the patient and family that they're concerned about the patient having risk of \"throwing something\" or having worsening pain and swelling secondary to severity of her leg swelling and pain. I did educate the patient and family that we would initially do blood work however she have a Doppler study performed. After evaluation and examination the patient I ordered blood work. Patient was offered pain medicine however she declined. CBC shows no significant sepsis or anemia requiring transfusion. Metabolic panel shows no blood to light disturbances or renal insufficiency. Liver functions are normal.  Coagulation studies are stable. D-dimer is 4075 concern in the left leg. However, because of the patient's leg swelling patient most likely has DVT, I have the attending physician also evaluated and discussed with the patient and family were concerned, patient is having difficulty walking with her crutches, states she started walking with her walker in the past and is unable to. We did educate the patient was admitted for 23 hour observation medicine possible that Medicare will not pay for her stay. However, the patient and family insisted the patient be admitted. Hospitalist paged for admission. She was ordered our requests for DVT prophylaxis and treatment. The attending physician spoke with the hospitalist on call, they spoke with the patient at length and hospitalist agreed to accept the patient for admission. The patient tolerated their visit well. I evaluated the patient. The physician was available for consultation as needed. The patient and / or the family were informed of the results of anytests, a time was given to answer questions, a plan was proposed and they agreed with plan. Therefore, patient will be admitted to hospital for further evaluation management of care. CLINICAL IMPRESSION:  1. Leg swelling    2. Cannot walk    3. Thyroid disease    4. Arthritis    5.  Elevated d-dimer        DISPOSITION Admitted 05/27/2019 11:05:32 PM      PATIENT REFERRED TO:  Hugo Desir MD  [facility] [address]  [facility] [address] [id number]  [phone]            DISCHARGE MEDICATIONS:  New Prescriptions    No medications on file       DISCONTINUED MEDICATIONS:  Discontinued Medications    No medications on file              (Please note the MDM and HPI sections of this note were completed with a voice recognition program.  Efforts weremade to edit the dictations but occasionally words are mis-transcribed.)    Electronically signed, RAMSES Simon CNP,         RAMSES Simon - NORA  05/27/19 5036       RAMSES Simon CNP  05/27/19 1538

## 2019-05-28 LAB
AMORPHOUS: ABNORMAL /HPF
APTT: 37.1 SEC (ref 26–36)
BACTERIA: ABNORMAL /HPF
BILIRUBIN URINE: NEGATIVE
BLOOD, URINE: ABNORMAL
CLARITY: CLEAR
COLOR: YELLOW
EPITHELIAL CELLS, UA: ABNORMAL /HPF
GLUCOSE URINE: NEGATIVE MG/DL
HCT VFR BLD CALC: 36.1 % (ref 36–48)
HEMOGLOBIN: 11.9 G/DL (ref 12–16)
KETONES, URINE: NEGATIVE MG/DL
LEUKOCYTE ESTERASE, URINE: NEGATIVE
LV EF: 58 %
LVEF MODALITY: NORMAL
MCH RBC QN AUTO: 36 PG (ref 26–34)
MCHC RBC AUTO-ENTMCNC: 33.1 G/DL (ref 31–36)
MCV RBC AUTO: 108.9 FL (ref 80–100)
MICROSCOPIC EXAMINATION: YES
NITRITE, URINE: POSITIVE
PDW BLD-RTO: 14.4 % (ref 12.4–15.4)
PH UA: 6 (ref 5–8)
PLATELET # BLD: 181 K/UL (ref 135–450)
PMV BLD AUTO: 7.3 FL (ref 5–10.5)
PROTEIN UA: NEGATIVE MG/DL
RBC # BLD: 3.31 M/UL (ref 4–5.2)
RBC UA: ABNORMAL /HPF (ref 0–2)
SPECIFIC GRAVITY UA: <=1.005 (ref 1–1.03)
URINE REFLEX TO CULTURE: YES
URINE TYPE: ABNORMAL
UROBILINOGEN, URINE: 0.2 E.U./DL
WBC # BLD: 5.8 K/UL (ref 4–11)
WBC UA: ABNORMAL /HPF (ref 0–5)

## 2019-05-28 PROCEDURE — 87186 SC STD MICRODIL/AGAR DIL: CPT

## 2019-05-28 PROCEDURE — 36415 COLL VENOUS BLD VENIPUNCTURE: CPT

## 2019-05-28 PROCEDURE — 93971 EXTREMITY STUDY: CPT

## 2019-05-28 PROCEDURE — 81001 URINALYSIS AUTO W/SCOPE: CPT

## 2019-05-28 PROCEDURE — 96365 THER/PROPH/DIAG IV INF INIT: CPT

## 2019-05-28 PROCEDURE — 97162 PT EVAL MOD COMPLEX 30 MIN: CPT

## 2019-05-28 PROCEDURE — 97166 OT EVAL MOD COMPLEX 45 MIN: CPT

## 2019-05-28 PROCEDURE — C8929 TTE W OR WO FOL WCON,DOPPLER: HCPCS

## 2019-05-28 PROCEDURE — 85730 THROMBOPLASTIN TIME PARTIAL: CPT

## 2019-05-28 PROCEDURE — 87077 CULTURE AEROBIC IDENTIFY: CPT

## 2019-05-28 PROCEDURE — G0378 HOSPITAL OBSERVATION PER HR: HCPCS

## 2019-05-28 PROCEDURE — 6360000002 HC RX W HCPCS: Performed by: INTERNAL MEDICINE

## 2019-05-28 PROCEDURE — 96366 THER/PROPH/DIAG IV INF ADDON: CPT

## 2019-05-28 PROCEDURE — 96376 TX/PRO/DX INJ SAME DRUG ADON: CPT

## 2019-05-28 PROCEDURE — 2580000003 HC RX 258: Performed by: NURSE PRACTITIONER

## 2019-05-28 PROCEDURE — 97110 THERAPEUTIC EXERCISES: CPT

## 2019-05-28 PROCEDURE — 85027 COMPLETE CBC AUTOMATED: CPT

## 2019-05-28 PROCEDURE — 97530 THERAPEUTIC ACTIVITIES: CPT

## 2019-05-28 PROCEDURE — 6360000002 HC RX W HCPCS: Performed by: NURSE PRACTITIONER

## 2019-05-28 PROCEDURE — 96372 THER/PROPH/DIAG INJ SC/IM: CPT

## 2019-05-28 PROCEDURE — 6370000000 HC RX 637 (ALT 250 FOR IP): Performed by: NURSE PRACTITIONER

## 2019-05-28 PROCEDURE — 87086 URINE CULTURE/COLONY COUNT: CPT

## 2019-05-28 PROCEDURE — 6360000004 HC RX CONTRAST MEDICATION: Performed by: INTERNAL MEDICINE

## 2019-05-28 RX ORDER — HEPARIN SODIUM 1000 [USP'U]/ML
4700 INJECTION, SOLUTION INTRAVENOUS; SUBCUTANEOUS PRN
Status: DISCONTINUED | OUTPATIENT
Start: 2019-05-29 | End: 2019-05-31 | Stop reason: ALTCHOICE

## 2019-05-28 RX ORDER — HEPARIN SODIUM 1000 [USP'U]/ML
2400 INJECTION, SOLUTION INTRAVENOUS; SUBCUTANEOUS PRN
Status: DISCONTINUED | OUTPATIENT
Start: 2019-05-29 | End: 2019-05-31 | Stop reason: ALTCHOICE

## 2019-05-28 RX ORDER — HEPARIN SODIUM 1000 [USP'U]/ML
2400 INJECTION, SOLUTION INTRAVENOUS; SUBCUTANEOUS ONCE
Status: COMPLETED | OUTPATIENT
Start: 2019-05-28 | End: 2019-05-28

## 2019-05-28 RX ORDER — ZOLPIDEM TARTRATE 5 MG/1
5 TABLET ORAL NIGHTLY PRN
Status: DISCONTINUED | OUTPATIENT
Start: 2019-05-28 | End: 2019-06-04 | Stop reason: HOSPADM

## 2019-05-28 RX ORDER — ZOLPIDEM TARTRATE 5 MG/1
5 TABLET ORAL NIGHTLY PRN
Status: DISCONTINUED | OUTPATIENT
Start: 2019-05-28 | End: 2019-05-28

## 2019-05-28 RX ORDER — HEPARIN SODIUM 10000 [USP'U]/100ML
8.3 INJECTION, SOLUTION INTRAVENOUS CONTINUOUS
Status: DISCONTINUED | OUTPATIENT
Start: 2019-05-28 | End: 2019-05-31 | Stop reason: ALTCHOICE

## 2019-05-28 RX ADMIN — ASPIRIN 81 MG: 81 TABLET, COATED ORAL at 09:35

## 2019-05-28 RX ADMIN — HEPARIN SODIUM 2400 UNITS: 1000 INJECTION INTRAVENOUS; SUBCUTANEOUS at 19:08

## 2019-05-28 RX ADMIN — PERFLUTREN 2.2 MG: 6.52 INJECTION, SUSPENSION INTRAVENOUS at 14:58

## 2019-05-28 RX ADMIN — ATENOLOL 25 MG: 25 TABLET ORAL at 09:35

## 2019-05-28 RX ADMIN — ZOLPIDEM TARTRATE 5 MG: 5 TABLET ORAL at 21:54

## 2019-05-28 RX ADMIN — LEVOTHYROXINE SODIUM 100 MCG: 100 TABLET ORAL at 06:23

## 2019-05-28 RX ADMIN — PANTOPRAZOLE SODIUM 40 MG: 40 TABLET, DELAYED RELEASE ORAL at 09:35

## 2019-05-28 RX ADMIN — SULFASALAZINE 500 MG: 500 TABLET ORAL at 09:35

## 2019-05-28 RX ADMIN — LEFLUNOMIDE 20 MG: 10 TABLET ORAL at 09:35

## 2019-05-28 RX ADMIN — HEPARIN SODIUM AND DEXTROSE 10.6 UNITS/HR: 10000; 5 INJECTION INTRAVENOUS at 19:07

## 2019-05-28 RX ADMIN — SULFASALAZINE 500 MG: 500 TABLET ORAL at 21:53

## 2019-05-28 RX ADMIN — SULFASALAZINE 500 MG: 500 TABLET ORAL at 00:30

## 2019-05-28 RX ADMIN — LOSARTAN POTASSIUM 50 MG: 25 TABLET, FILM COATED ORAL at 09:35

## 2019-05-28 RX ADMIN — ZOLPIDEM TARTRATE 5 MG: 5 TABLET ORAL at 03:20

## 2019-05-28 RX ADMIN — ENOXAPARIN SODIUM 40 MG: 40 INJECTION SUBCUTANEOUS at 09:36

## 2019-05-28 RX ADMIN — SODIUM CHLORIDE, PRESERVATIVE FREE 10 ML: 5 INJECTION INTRAVENOUS at 09:36

## 2019-05-28 ASSESSMENT — PAIN SCALES - GENERAL
PAINLEVEL_OUTOF10: 0
PAINLEVEL_OUTOF10: 0

## 2019-05-28 NOTE — H&P
Hospital Medicine History & Physical      PCP: Giles Arellano MD    Date of Admission: 5/27/2019    Date of Service: Pt seen/examined on 5/27/19 and Placed in Observation. Chief Complaint:  Left leg swelling      History Of Present Illness:   80 y.o. female who presented to [facility] with PMH: Hypertension, hyperlipidemia, thyroid disease, CAD, arthritis. Patient has swelling to her left leg, that started on Saturday after a 4 hour car ride. The area is greater in size to the right leg. Patient is using walker, but states she cannot stand now. The left lower extremity area feels \"sore\". The leg does not appear to be cellulitis. There is no increased warmth, or erythema. Past Medical History:          Diagnosis Date    Arthritis     CAD (coronary artery disease)     GERD (gastroesophageal reflux disease)     Hyperlipidemia     Hypertension     Thyroid disease        Past Surgical History:          Procedure Laterality Date    CHOLECYSTECTOMY      CORONARY ANGIOPLASTY WITH STENT PLACEMENT      JOINT REPLACEMENT      \"knees and hips\"    FL ESOPHAGOGASTRODUODENOSCOPY TRANSORAL DIAGNOSTIC N/A 11/7/2018    EGD ESOPHAGOGASTRODUODENOSCOPY performed by Latonya Torres MD at [address]       Medications Prior to Admission:      Prior to Admission medications    Medication Sig Start Date End Date Taking? Authorizing Provider   zolpidem (AMBIEN) 10 MG tablet Take 10 mg by mouth nightly as needed for Sleep.    Yes Historical Provider, MD   pantoprazole (PROTONIX) 40 MG tablet Take 1 tablet by mouth 2 times daily (before meals)  Patient taking differently: Take 40 mg by mouth daily  11/8/18  Yes Susana Duffy MD   sulfaSALAzine (AZULFIDINE) 500 MG tablet Take 500 mg by mouth 2 times daily   Yes Historical Provider, MD   leflunomide (ARAVA) 20 MG tablet Take 20 mg by mouth daily   Yes Historical Provider, MD   aspirin 81 MG tablet Take 81 mg by mouth daily   Yes Historical Provider, MD   levothyroxine (SYNTHROID) 100 MCG tablet Take 100 mcg by mouth Daily   Yes Historical Provider, MD   losartan (COZAAR) 50 MG tablet Take 50 mg by mouth daily   Yes Historical Provider, MD   atenolol (TENORMIN) 25 MG tablet Take 25 mg by mouth daily   Yes Historical Provider, MD   HYDROcodone-acetaminophen (NORCO) 5-325 MG per tablet Take 1 tablet by mouth 2 times daily as needed for Pain. .   Yes Historical Provider, MD       Allergies:  Adhesive tape and Amlodipine    Social History:      The patient currently lives with     TOBACCO:   reports that she has never smoked. She has never used smokeless tobacco.  ETOH:   reports that she does not drink alcohol. Family History:      Reviewed in detail. Positive as follows:    History reviewed. No pertinent family history. REVIEW OF SYSTEMS:   Pertinent positives as noted in the HPI. All other systems reviewed and negative. PHYSICAL EXAM PERFORMED:    BP (!) 150/69   Pulse 77   Temp 98.4 °F (36.9 °C) (Oral)   Resp 16   Ht 5' 2\" (1.575 m)   Wt 160 lb (72.6 kg)   SpO2 94%   BMI 29.26 kg/m²     General appearance:  No apparent distress, appears stated age and cooperative. HEENT:  Normal cephalic, atraumatic without obvious deformity. Pupils equal, round, and reactive to light. Extra ocular muscles intact. Conjunctivae/corneas clear. Neck: Supple, with full range of motion. No jugular venous distention. Trachea midline. Respiratory:  Normal respiratory effort. dim to auscultation, bilaterally without Rales/Wheezes/Rhonchi. Cardiovascular:  Regular rate and rhythm with normal S1/S2 without murmurs, rubs or gallops. Abdomen: Soft, non-tender, non-distended with normal bowel sounds. Musculoskeletal:  No clubbing, cyanosis. 2+ left lower extremity edema. dec range of motion with deformity to right foot. Skin: Skin color, texture, turgor normal.  No rashes or lesions.   Neurologic:  Neurovascularly intact without any focal sensory/motor deficits. Cranial nerves: II-XII intact, grossly non-focal.  Psychiatric:  Alert and oriented, thought content appropriate, normal insight  Capillary Refill: Brisk,< 3 seconds   Peripheral Pulses: +2 palpable, equal bilaterally       Labs:     Recent Labs     05/27/19 1946   WBC 5.9   HGB 11.3*   HCT 34.3*        Recent Labs     05/27/19 1946      K 4.0      CO2 28   BUN 17   CREATININE <0.5*   CALCIUM 9.1     Recent Labs     05/27/19 1946   AST 25   ALT 15   BILITOT 0.5   ALKPHOS 97     Recent Labs     05/27/19 1946   INR 1.02     No results for input(s): Maribell Winter in the last 72 hours. Urinalysis:      Lab Results   Component Value Date    NITRU Negative 09/08/2016    BLOODU Negative 09/08/2016    SPECGRAV 1.020 09/08/2016    GLUCOSEU Negative 09/08/2016       Radiology:     CXR: I have reviewed the CXR with the following interpretation: none  EKG:  I have reviewed the EKG with the following interpretation: none    VL DUP LOWER EXTREMITY VENOUS LEFT    (Results Pending)       ASSESSMENT:    Active Hospital Problems    Diagnosis Date Noted    Gait disturbance [R26.9] 05/27/2019    Hypertension [I10]     Hyperlipidemia [E78.5]          PLAN:  Gait disturbance, possibly from DVT to left lower extremity  1 dose of Eliquis given in the emergency room  Ultrasound of left lower extremity pending  Echo in am  HTN  BPs have been controlled  Continue current anti hypertensive regimen, titrate dose as indicated  Reinforced and educated patient about weight loss/ lifestyle changes/ aerobic exercise/ low sodium and DASH diet     DVT Prophylaxis: Lovenox  Diet: Cardiac/low sodium  Code Status: Full code    PT/OT Eval Status: Not ordered    Dispo - less than [address], APRN - CNP    Thank you Rodney Pettit MD for the opportunity to be involved in this patient's care.  If you have any questions or concerns please feel free to contact me at [phone]. No

## 2019-05-28 NOTE — ED PROVIDER NOTES
Emergency Department Provider Note     Location: [facility]  ED  5/27/2019    I independently performed a history and physical on Baron Newell. All diagnostic, treatment, and disposition decisions were made by myself in conjunction with the mid-level provider. Briefly, this is a 80 y.o. female here for left leg swelling and inability to walk that started on Saturday. Patient denies pain. She also denies trauma. Family thought the lower leg appears slightly red. No chest pain or SOB. No fever. Patient has rapid functional decline since and she could not walk today and several family member had to help lift the patient into the car to get her here today. ED Triage Vitals [05/27/19 1851]   BP Temp Temp Source Pulse Resp SpO2 Height Weight   139/65 99.2 °F (37.3 °C) Oral 78 16 93 % 5' 2\" (1.575 m) 160 lb (72.6 kg)        Exam showed WDWN female NAD, awake, alert. Patient wears leg brace and has special orthotic shoes. Left lower leg swollen compared to the right. Slight erythema but not significantly warm (appears to be more consistent with venous stasis rather than cellulitis). Patient seen and examined in room 22. Lab - d-dimer 4075. Anemia better than baseline. Plt count normal. No elevated WBC. CMP unremarkable. There is no vascular ultrasound in the evening. Initial plan was anitcoagulation and bring the patient back in the morning for duplex ultrasound. However, patient truly struggled to walk so there's concern for safety at home and also how family is going to get her home and then back. She may benefit from PT/OT eval regardless if she has DVT. Pt and family understand this will be observation admission but we're all in agreement that this is probably the best option given that patient is struggling to ambulate.       I spoke with Marilyn Marquez NP for hospitalist. We thoroughly discussed the history, physical exam, laboratory and imaging studies, as well as, emergency department course. Based upon that discussion, we've decided to admit Pilar Mcqueen for further observation and evaluation of Pradip Gold's left leg swelling and inability to walk. As I have deemed necessary from their history, physical, and studies, I have considered and evaluated Pilar Mcqueen for the following diagnoses: DEEP SPACE INFECTIONS, DEEP VENOUS THROMBOSIS, LEO'S GANGRENE, SEPSIS, and TOXIC SHOCK SYNDROME. FINAL IMPRESSION  1. Leg swelling    2. Cannot walk        Vitals:  Blood pressure (!) 149/67, pulse 83, temperature 98.7 °F (37.1 °C), temperature source Oral, resp. rate 18, height 5' 2\" (1.575 m), weight 160 lb (72.6 kg), SpO2 97 %. For further details of [facility] emergency department encounter, please see Fbaricio Champagne NP's documentation. This chart was generated in part by using Dragon Dictation system and may contain errors related to that system including errors in grammar, punctuation, and spelling, as well as words and phrases that may be inappropriate. If there are any questions or concerns please feel free to contact the dictating provider for clarification.           Liliana Zafar MD  05/27/19 [address] Niels Holter, MD  05/27/19 0732

## 2019-05-28 NOTE — PROGRESS NOTES
Occupational Therapy   Occupational Therapy Initial Assessment  Date: 2019   Patient Name: Gustavo Jones  MRN: [id number]     : 1927    Date of Service: 2019    Discharge Recommendations:  Continue to assess pending progress(with safe mobility  and standing ADL's)       Assessment   Performance deficits / Impairments: Decreased functional mobility ; Decreased ADL status; Decreased balance  Patient Education: role of OT, safety, importance of being up OOB for meals, toileting  REQUIRES OT FOLLOW UP: Yes  Activity Tolerance  Activity Tolerance: Patient Tolerated treatment well  Safety Devices  Safety Devices in place: Yes  Type of devices: Call light within reach; Chair alarm in place; Left in chair;Nurse notified           Patient Diagnosis(es): The primary encounter diagnosis was Leg swelling. Diagnoses of Cannot walk, Thyroid disease, Arthritis, and Elevated d-dimer were also pertinent to this visit. has a past medical history of Arthritis, CAD (coronary artery disease), GERD (gastroesophageal reflux disease), Hyperlipidemia, Hypertension, and Thyroid disease. has a past surgical history that includes joint replacement; Cholecystectomy; Coronary angioplasty with stent; and pr esophagogastroduodenoscopy transoral diagnostic (N/A, 2018). Restrictions  Restrictions/Precautions  Restrictions/Precautions: Fall Risk, General Precautions  Required Braces or Orthoses?: Yes(orthopedic shoes)  Required Braces or Orthoses  Right Lower Extremity Brace:  Ankle Foot Orthotics  Position Activity Restriction  Other position/activity restrictions: High fall risk; up with assist    Subjective   General  Chart Reviewed: Yes  Patient assessed for rehabilitation services?: Yes  Family / Caregiver Present: No  Referring Practitioner: RAMSES Jarvis, CNP  Diagnosis: gait disturbance, Left LE edema  Subjective  Subjective: \"I thought I was going to be able to go home today\"  General Comment  Comments: RN cleared pt for OT; pt resting in bed, requiring mod encouragement to get up OOB to chair      Social/Functional History  Social/Functional History  Lives With: Spouse  Type of Home: House  Home Layout: (stair lift to 2nd floor)  Home Access: Stairs to enter with rails(5, uses crutch on one side, rail on other)  Entrance Stairs - Number of Steps: Has stair lift to 2nd floor inside home  Bathroom Shower/Tub: Walk-in shower  Bathroom Toilet: Standard  Bathroom Equipment: Grab bars in [address] [place]: Crutches, [employer]  ADL Assistance: Needs assistance(spouse assists with bathing and lower body dressing)  Ambulation Assistance: Independent(axillary crutches)  Transfer Assistance: Independent  Additional Comments: uses orthopedic shoe LLE, AFO with orthopedic shoe RLE       Objective        Orientation  Overall Orientation Status: Within Functional Limits     Balance  Sitting Balance: Supervision  Standing Balance: Contact guard assistance(with cruz.  axillary crutches)  Standing Balance  Activity: bed-->chair to RIght  ADL  Feeding: Setup(for lids/pkgs)  Grooming: Setup(seated in chair to wash face & hands)  LE Dressing: Maximum assistance(for socks & Right AFO, unable to tegan Left orthopedic shoe in bed, d/t increased edema)  Toileting: Unable to assess(comment)(declined walking to bathroom )    RUE Tone: Normotonic  LUE Tone: Normotonic    Coordination  Movements Are Fluid And Coordinated: No  Coordination and Movement description: Gross motor impairments;Right UE;Left UE     Bed mobility  Supine to Sit: Stand by assistance(to right)  Sit to Supine: Unable to assess(Left up in chair upon exiting)  Transfers  Sit to stand: Contact guard assistance  Stand to sit: Contact guard assistance     Cognition  Overall Cognitive Status: WFL    LUE General AROM: limited for finger extension & limited to 90* shoulder flexion  RUE General AROM: limited for finger extension & limited to 90* shoulder flexion     Plan   Plan  Times per week: 3-5x/ week   Current Treatment Recommendations: Balance Training, Functional Mobility Training, Safety Education & Training           AM-PAC Score        AM-PAC Inpatient Daily Activity Raw Score: 12  AM-PAC Inpatient ADL T-Scale Score : 30.6  ADL Inpatient CMS 0-100% Score: 66.57  ADL Inpatient CMS G-Code Modifier : CL    Goals  Short term goals  Time Frame for Short term goals: 3-5 days  Short term goal 1: supervision with bathroom mobility by 6-02-19  Short term goal 2: supervision standing ADL's for 5 minutes   Short term goal 3: independent with toileting  Patient Goals   Patient goals : go home       Therapy Time   Individual Concurrent Group Co-treatment   Time In [address]         Time Out 1330         Minutes [address], [place]

## 2019-05-28 NOTE — PROGRESS NOTES
Vascular    Received consult for RLE DVT. Currently on anticoagulation.   Recc Elevate RLE higher than heart level with strict bedrest.   Patient will be seen in am.

## 2019-05-28 NOTE — PROGRESS NOTES
Hospitalist Progress Note      PCP: Antonio Miller MD    Date of Admission: 5/27/2019      Chief Complaint:  Left leg swelling      Hospital Course:       Subjective: notes ongoing LLE swelling, daughter at bedside       Medications:  Reviewed    Infusion Medications   Scheduled Medications    enoxaparin  40 mg Subcutaneous Daily    aspirin  81 mg Oral Daily    atenolol  25 mg Oral Daily    leflunomide  20 mg Oral Daily    levothyroxine  100 mcg Oral Daily    losartan  50 mg Oral Daily    pantoprazole  40 mg Oral Daily    sulfaSALAzine  500 mg Oral BID    sodium chloride flush  10 mL Intravenous 2 times per day     PRN Meds: zolpidem, HYDROcodone-acetaminophen, sodium chloride flush, magnesium hydroxide, ondansetron, acetaminophen      Intake/Output Summary (Last 24 hours) at 5/28/2019 1516  Last data filed at 5/28/2019 0929  Gross per 24 hour   Intake 340 ml   Output --   Net 340 ml       Physical Exam Performed:    /77   Pulse 80   Temp 98 °F (36.7 °C) (Oral)   Resp 16   Ht 5' 2\" (1.575 m)   Wt 160 lb (72.6 kg)   SpO2 96%   BMI 29.26 kg/m²        General appearance:  No apparent distress, appears stated age and cooperative. HEENT:  Normal cephalic, atraumatic without obvious deformity. Pupils equal, round, and reactive to light. Extra ocular muscles intact. Conjunctivae/corneas clear. Neck: Supple, with full range of motion. No jugular venous distention. Trachea midline. Respiratory:  Normal respiratory effort. dim to auscultation, bilaterally without Rales/Wheezes/Rhonchi. Cardiovascular:  Regular rate and rhythm with normal S1/S2 without murmurs, rubs or gallops. Abdomen: Soft, non-tender, non-distended with normal bowel sounds. Musculoskeletal:  No clubbing, cyanosis. 2+ left lower extremity edema. dec range of motion with deformity to right foot. Rheumatic joints noted  Skin: Skin color, texture, turgor normal.  No rashes or lesions.   Neurologic:  Neurovascularly

## 2019-05-28 NOTE — PROGRESS NOTES
Physical Therapy    Facility/Department: [facility] C5 - MED SURG/ORTHO  Initial Assessment    NAME: Chris Talavera  : 1927  MRN: [id number]    Date of Service: 2019    Discharge Recommendations:  Continue to assess pending progress, still awaiting tests and LLE too swollen to don orthopedic shoe for amb        Assessment   Body structures, Functions, Activity limitations: Decreased functional mobility ; Decreased endurance;Decreased strength;Decreased ROM  Assessment: 81 yo female adm with Gait disturbance, LLE edema. PLOF Assist for ADLS, amb indep with crutches, AFO, orthopedic shoes. Today AM PAC score of 16 indicates pt would benefit from skilled PT to address current deficits. Will cont to assess discharge recs pending gait eval. Still waiting on further testing and LLE too swollen for shoe today  Treatment Diagnosis: decreased gait and mobility  Specific instructions for Next Treatment: ex, mobility  Prognosis: Good  Decision Making: Medium Complexity  Patient Education: role of PT,s afety  Barriers to Learning: pt voices understanding  REQUIRES PT FOLLOW UP: Yes  Activity Tolerance  Activity Tolerance: Patient Tolerated treatment well       Patient Diagnosis(es): The primary encounter diagnosis was Leg swelling. Diagnoses of Cannot walk, Thyroid disease, Arthritis, and Elevated d-dimer were also pertinent to this visit. has a past medical history of Arthritis, CAD (coronary artery disease), GERD (gastroesophageal reflux disease), Hyperlipidemia, Hypertension, and Thyroid disease. has a past surgical history that includes joint replacement; Cholecystectomy; Coronary angioplasty with stent; and pr esophagogastroduodenoscopy transoral diagnostic (N/A, 2018).     Restrictions  Restrictions/Precautions  Restrictions/Precautions: Fall Risk, General Precautions  Position Activity Restriction  Other position/activity restrictions: up with assist  Vision/Hearing  Vision: Impaired  Vision Exceptions: (I wear my glasses sometimes)  Hearing: Within functional limits     Subjective  General  Chart Reviewed: Yes  Referring Practitioner: Valdo Lira CNP  Referral Date : 05/27/19  Diagnosis: Gait disturbance  Pain Screening  Patient Currently in Pain: Denies          Orientation  Orientation  Overall Orientation Status: Within Normal Limits  Social/Functional History  Social/Functional History  Lives With: Spouse  Type of Home: House  Home Layout: Two level  Home Access: Stairs to enter with rails(5, uses crutch on one side, rail on other)  Entrance Stairs - Number of Steps: Has stair lift to 2nd floor inside home  Bathroom Shower/Tub: Walk-in shower  Bathroom Toilet: Standard  Bathroom Equipment: Grab bars in shower  Home Equipment: Crutches, [employer]  ADL Assistance: Needs assistance(spouse assists with bathing and lower body dressing)  Ambulation Assistance: Independent(axillary crutches)  Transfer Assistance: Independent  Additional Comments: uses orthopedic shoe LLE, AFO with orthopedic shoe RLE    Objective   AROM BLE WFL at hips and knees  Strength: Grossly 2+ to 3-/5 at hips, 4-/5 knee ext      Bed mobility  Supine to Sit: Stand by assistance  Sit to Supine: Stand by assistance  Scooting: Stand by assistance  Transfers  Sit to Stand: Minimal Assistance  Stand to sit: Minimal Assistance  Ambulation  Ambulation?: No(LLE too swollen to don shoe)        Exercises  Heelslides: 5x B  Gluteal Sets: 5 x B  Hip Abduction: 5 x B  Ankle Pumps: 10 x B     Plan   Plan  Times per week: 3-5 x wk  Specific instructions for Next Treatment: ex, mobility  Current Treatment Recommendations: Strengthening, Gait Training, Stair training, Balance Training, Functional Mobility Training, Transfer Training, Safety Education & Training  Safety Devices  Type of devices:  All fall risk precautions in place, Left in bed, Call light within reach, Gait belt, Patient at risk for falls, Nurse notified, Bed alarm in place AM-PAC Score  AM-PAC Inpatient Mobility Raw Score : 16  AM-PAC Inpatient T-Scale Score : 40.78  Mobility Inpatient CMS 0-100% Score: 54.16  Mobility Inpatient CMS G-Code Modifier : CK          Goals  Short term goals  Time Frame for Short term goals: 1 week(6/4) unless otherwise specified  Short term goal 1: pt to be modified indep with bed mob  Short term goal 2: pt to be CG for transfers  Short term goal 3: pt to amb with crutches, AFO, orthopedic shoes and  ft  Short term goal 4: pt to participate in 10 reps LE ex by 6/1  Patient Goals   Patient goals : \"To walk\"       Therapy Time   Individual Concurrent Group Co-treatment   Time In 1010         Time Out 5671         Minutes 22               If pt discharges prior to next PT session this note will serve as discharge summary.   Manny De La Cruz, PT

## 2019-05-28 NOTE — CONSULTS
Pharmacy to Manage Heparin Infusion per [facility]    Dx: RLE DVT  Pt wt = 59kg (adjusted body weight  Baseline aPTT = 37.1sec at 1739    High Dose Heparin Infusion  Heparin 80 units/kg IVP bolus followed by Heparin infusion at 18 units/kg/hr. Recheck aPTT in 6 hours. Goal aPTT = 54-90 seconds. Give 2400unit heparin bolus and start infusion at 10.6ml/hr  Next aPTT blanquita Eagle PharmD 5/28/2019 6:56 PM     5/29 0112  Aptt = 115.4  Hold heparin for 1 hour and restart at 7.1 ml/hr  Next aPTT 0800  Nimo Jones PharmD 1:51 AM 5/29/19    aPTT = 51.5 seconds at 1512  Heparin 2400 units IVP bolus then increase Heparin infusion to 8.3 mL/hr. Recheck aPTT tonight at 2200. Rain Mondragon PharmD, BCPS   5/29/2019 4:07 PM    5/29 2228  aptt = 76.1 sec  Continue current rate. Next aptt 0430  Bria Enriquez Pharm D.5/29/2019 11:35 PM    5/30 0420  aptt = 67.1 sec. Continue current rate. Next aptt daily. Bria Enriquez Pharm D.5/30/2019 5:06 AM    aPTT = 64.5 seconds at 0706  No change to Heparin. Continue Heparin infusion at 8.3 mL/hr. Recheck aPTT tomorrow AM.  Rain Mondragon PharmD, BCPS   5/31/2019 9:48 AM

## 2019-05-28 NOTE — PROGRESS NOTES
4 Eyes Skin Assessment     The patient is being assess for   Admission    I agree that 2 RN's have performed a thorough Head to Toe Skin Assessment on the patient. ALL assessment sites listed below have been assessed. Areas assessed by both nurses:   [x]   Head, Face, and Ears   [x]   Shoulders, Back, and Chest, Abdomen  [x]   Arms, Elbows, and Hands   [x]   Coccyx, Sacrum, and Ischium  [x]   Legs, Feet, and Heels            **SHARE this note so that the co-signing nurse is able to place an eSignature**    Co-signer eSignature: Electronically signed by Andreas Guillen RN on 6/14/32 at 12:59 AM    Does the Patient have Skin Breakdown?   No          Yoav Prevention initiated:  No   Wound Care Orders initiated:  No      [facility] nurse consulted for Pressure Injury (Stage 3,4, Unstageable, DTI, NWPT, Complex wounds)and New or Established Ostomies:  No      Primary Nurse eSignature: Electronically signed by Joseph Kemp RN on 5/28/19 at 12:58 AM

## 2019-05-29 PROBLEM — I82.412 DVT OF DEEP FEMORAL VEIN, LEFT (HCC): Status: ACTIVE | Noted: 2019-05-29

## 2019-05-29 LAB
APTT: 115.4 SEC (ref 26–36)
APTT: 51.5 SEC (ref 26–36)
APTT: 68.1 SEC (ref 26–36)
APTT: 76.1 SEC (ref 26–36)

## 2019-05-29 PROCEDURE — 6360000002 HC RX W HCPCS: Performed by: INTERNAL MEDICINE

## 2019-05-29 PROCEDURE — 96366 THER/PROPH/DIAG IV INF ADDON: CPT

## 2019-05-29 PROCEDURE — 36415 COLL VENOUS BLD VENIPUNCTURE: CPT

## 2019-05-29 PROCEDURE — 6370000000 HC RX 637 (ALT 250 FOR IP): Performed by: NURSE PRACTITIONER

## 2019-05-29 PROCEDURE — 2580000003 HC RX 258: Performed by: NURSE PRACTITIONER

## 2019-05-29 PROCEDURE — 99221 1ST HOSP IP/OBS SF/LOW 40: CPT | Performed by: SURGERY

## 2019-05-29 PROCEDURE — 1200000000 HC SEMI PRIVATE

## 2019-05-29 PROCEDURE — 94669 MECHANICAL CHEST WALL OSCILL: CPT

## 2019-05-29 PROCEDURE — 85730 THROMBOPLASTIN TIME PARTIAL: CPT

## 2019-05-29 RX ORDER — HEPARIN SODIUM 1000 [USP'U]/ML
2400 INJECTION, SOLUTION INTRAVENOUS; SUBCUTANEOUS ONCE
Status: COMPLETED | OUTPATIENT
Start: 2019-05-29 | End: 2019-05-29

## 2019-05-29 RX ADMIN — PANTOPRAZOLE SODIUM 40 MG: 40 TABLET, DELAYED RELEASE ORAL at 08:23

## 2019-05-29 RX ADMIN — HEPARIN SODIUM AND DEXTROSE 8.3 ML/HR: 10000; 5 INJECTION INTRAVENOUS at 22:29

## 2019-05-29 RX ADMIN — SULFASALAZINE 500 MG: 500 TABLET ORAL at 22:28

## 2019-05-29 RX ADMIN — LEVOTHYROXINE SODIUM 100 MCG: 100 TABLET ORAL at 06:04

## 2019-05-29 RX ADMIN — SODIUM CHLORIDE, PRESERVATIVE FREE 10 ML: 5 INJECTION INTRAVENOUS at 08:23

## 2019-05-29 RX ADMIN — ZOLPIDEM TARTRATE 5 MG: 5 TABLET ORAL at 22:28

## 2019-05-29 RX ADMIN — ATENOLOL 25 MG: 25 TABLET ORAL at 08:23

## 2019-05-29 RX ADMIN — LOSARTAN POTASSIUM 50 MG: 25 TABLET, FILM COATED ORAL at 08:22

## 2019-05-29 RX ADMIN — ASPIRIN 81 MG: 81 TABLET, COATED ORAL at 08:23

## 2019-05-29 RX ADMIN — HEPARIN SODIUM 2400 UNITS: 1000 INJECTION INTRAVENOUS; SUBCUTANEOUS at 16:44

## 2019-05-29 RX ADMIN — LEFLUNOMIDE 20 MG: 10 TABLET ORAL at 08:22

## 2019-05-29 RX ADMIN — SULFASALAZINE 500 MG: 500 TABLET ORAL at 08:23

## 2019-05-29 ASSESSMENT — PAIN SCALES - GENERAL
PAINLEVEL_OUTOF10: 0
PAINLEVEL_OUTOF10: 0

## 2019-05-29 NOTE — PROGRESS NOTES
Vascular    Full consult to follow. LLE DVT in 81 yo. Reports leg feels better today. Recc:  Strict Bedrest with elevation above Heart. Anticoagulation and Compression. At advanced age high risk for complications with lytics and lying prone for several hours would be poorly tolerated.

## 2019-05-29 NOTE — PROGRESS NOTES
Occupational Therapy  Per RN and orders from Dr. Ting Oscar, please hold therapies this date d/t pt on strict bedrest. Will re-attempt next date as schedule permits and pt is appropriate for activity.        Guerrero Conley, OTR/L  [place], [place]

## 2019-05-29 NOTE — PROGRESS NOTES
Shift assessment complete see flowsheet. Per nightshift RN who rounded with Dr. Bernie Garzon MD request LLE ace wrapped. Ace applied. Nursing communication entered to check skin every shift. Left pedal pulse found by doppler, site marked. Pt denies any pain. LLE remains elevated.

## 2019-05-29 NOTE — PROGRESS NOTES
joints noted  Skin: Skin color, texture, turgor normal.  No rashes or lesions. Neurologic:  Neurovascularly intact without any focal sensory/motor deficits. Cranial nerves: II-XII intact, grossly non-focal.  Psychiatric:  Alert and oriented, thought content appropriate, normal insight  Capillary Refill: Brisk,< 3 seconds   Peripheral Pulses: +2 palpable, equal bilaterally              Labs:   Recent Labs     05/27/19 1946 05/28/19  1738   WBC 5.9 5.8   HGB 11.3* 11.9*   HCT 34.3* 36.1    181     Recent Labs     05/27/19 1946      K 4.0      CO2 28   BUN 17   CREATININE <0.5*   CALCIUM 9.1     Recent Labs     05/27/19 1946   AST 25   ALT 15   BILITOT 0.5   ALKPHOS 97     Recent Labs     05/27/19 1946   INR 1.02     No results for input(s): Delcie Mosinee in the last 72 hours.     Urinalysis:      Lab Results   Component Value Date    NITRU POSITIVE 05/28/2019    WBCUA 0-2 05/28/2019    BACTERIA 4+ 05/28/2019    RBCUA 3-5 05/28/2019    BLOODU TRACE-LYSED 05/28/2019    SPECGRAV <=1.005 05/28/2019    GLUCOSEU Negative 05/28/2019       Radiology:  VL Extremity Venous Left                 Assessment/Plan:    Active Hospital Problems    Diagnosis    DVT of deep femoral vein, left (HCC) [I82.412]    Gait disturbance [R26.9]    Hypertension [I10]    Hyperlipidemia [E78.5]        Gait disturbance, possibly from DVT to left lower extremity  1 dose of Eliquis given in the emergency room, noted ddimer of 4075  Ultrasound of left lower extremity noted extensive dvt  Echo in am done(ef 55%, no wm abn, mild lvh)  -vas surg consulted, no intervention at this time, no phlegmasia, ace wrap started, -R>B for intervention given age  -started heparin ggt    HTN  BPs have been controlled  Continue current anti hypertensive regimen, titrate dose as indicated  Reinforced and educated patient about weight loss/ lifestyle changes/ aerobic exercise/ low sodium and DASH diet      RA- continued home sulfasalazine and arava     Hypothyroid- stable, on synthroid        DVT Prophylaxis: heparin ggt  Diet: DIET CARDIAC; Low Sodium (2 GM)  Code Status: Full Code    PT/OT Eval Status: ordered    Dispo - pending further vas surg recs, pt/ot eval    Kristal Gonzalez MD

## 2019-05-29 NOTE — DISCHARGE INSTR - COC
Continuity of Care Form    Patient Name: Joslyn Crook   :  1927  MRN:  [id number]    Admit date:  2019  Discharge date:  ***    Code Status Order: Full Code   Advance Directives:   [facility] Documentation     Date/Time Healthcare Directive Type of Healthcare Directive Copy in [address] Agent's Name Healthcare Agent's Phone Number    [phone]  No, patient does not have an advance directive for healthcare treatment -- -- -- -- --          Admitting Physician:  Lula Coronado MD  PCP: Soni Avila MD    Discharging Nurse: [facility] Unit/Room#: 4051/4753-66  Discharging Unit Phone Number: [phone]    Emergency Contact:   Extended Emergency Contact Information  Primary Emergency Contact: Daniel Gold  Address: [address] [place] Smith of [address] [place] [address] Phone: [phone]  Relation: Spouse  Secondary Emergency Contact: ALEX CAMPOS  Mobile Phone: [phone]  Relation: Child    Past Surgical History:  Past Surgical History:   Procedure Laterality Date    CHOLECYSTECTOMY      CORONARY ANGIOPLASTY WITH STENT PLACEMENT     [address] and hips\"    AR ESOPHAGOGASTRODUODENOSCOPY TRANSORAL DIAGNOSTIC N/A 2018    EGD ESOPHAGOGASTRODUODENOSCOPY performed by Bobo Louis MD at [address]       Immunization History: There is no immunization history on file for this patient.     Active Problems:  Patient Active Problem List   Diagnosis Code    GI bleed K92.2    Thyroid disease E07.9    Hypertension I10    Hyperlipidemia E78.5    GERD (gastroesophageal reflux disease) K21.9    CAD (coronary artery disease) I25.10    Arthritis M19.90    Gait disturbance R26.9    DVT of deep femoral vein, left (HCC) I82.412       Isolation/Infection:   Isolation          No Isolation            Nurse Assessment:  Last Vital Signs: /79   Pulse Patient's personal belongings (please select all that are sent with patient):  Glasses    RN SIGNATURE:  Electronically signed by Shannan Appiah RN on 6/3/19 at 2:54 PM    CASE MANAGEMENT/SOCIAL WORK SECTION    Inpatient Status Date: 5/29/19    Readmission Risk Assessment Score:  Readmission Risk              Risk of Unplanned Readmission:        10           Discharging to Facility/ Agency   · Name: [facility]   · Address:  · Phone: [phone]  · Fax: [phone]    Dialysis Facility (if applicable)   · Name:  · Address:  · Dialysis Schedule:  · Phone:  · Fax:    / signature: Electronically signed by Jami Holman RN on 6/3/19 at 4:26 PM    PHYSICIAN SECTION    Prognosis: Good    Condition at Discharge: Stable    Rehab Potential (if transferring to Rehab): Good    Recommended Labs or Other Treatments After Discharge: PCP    Recommended Follow-up, Labs or Other Treatments After Discharge:    eliquis 10 bid till 6 /6   Then 5 bid   Monitor labs   compression stocking                Physician Certification: I certify the above information and transfer of Joslyn Crook  is necessary for the continuing treatment of the diagnosis listed and that she requires [facility] for less 30 days.      Update Admission H&P: No change in H&P    PHYSICIAN SIGNATURE:  Electronically signed by Dena Andersen MD on 6/4/19 at 9:17 AMf

## 2019-05-29 NOTE — CARE COORDINATION
CASE MANAGEMENT INITIAL ASSESSMENT      Reviewed chart and met with patient today, re: possible needs  Explained Case Management role/services. Family present: None   Primary contact information:  Reji Sites [phone] date/status: 05-. Switched to inpatient on 5-29  Diagnosis: Gait disturbance     Insurance: [employer] Medicare  Precert required for SNF - Y        3 night stay required -  N    Living arrangements, Adls, care needs, prior to admission: She lives with her , independent with her ADL's and doesn't drive     Transportation: she stated she would have a ride home from family on discharge. Durable Medical Equipment at home: [address] in the home and/or outpatient, prior to admission: None currently but she has had [facility] in the past and requested a referral to them again. PT/OT recs: Ordered but is on hold due to Strict Bedrest (see note from Elda Savage on this date)    Hospital Exemption Notification (HEN): Needed for SNF     Barriers to discharge: Therapy recommendations are pending; will require cert if SNF is recommended. Plan/comments: Spoke with patient at bedside. She stated her  should be the main contact. She is thinking she may need home care again and [facility] Verified her address, PCP and phone number listed on epic. She is aware MD will need to be agreeable to home care and writer orders. She stated understanding. Placed call to [facility] and spoke Deborah for the referral. She stated that they can accept patient back for RN/PT/OT. Facesheet sent via epic.      [facility] on chart for MD signature    Laurence Arriaga MSW, LSW

## 2019-05-30 LAB
APTT: 67.1 SEC (ref 26–36)
ORGANISM: ABNORMAL
PLATELET # BLD: 184 K/UL (ref 135–450)
URINE CULTURE, ROUTINE: ABNORMAL
URINE CULTURE, ROUTINE: ABNORMAL

## 2019-05-30 PROCEDURE — 1200000000 HC SEMI PRIVATE

## 2019-05-30 PROCEDURE — 97110 THERAPEUTIC EXERCISES: CPT

## 2019-05-30 PROCEDURE — 6370000000 HC RX 637 (ALT 250 FOR IP): Performed by: NURSE PRACTITIONER

## 2019-05-30 PROCEDURE — 97535 SELF CARE MNGMENT TRAINING: CPT

## 2019-05-30 PROCEDURE — 85730 THROMBOPLASTIN TIME PARTIAL: CPT

## 2019-05-30 PROCEDURE — 85049 AUTOMATED PLATELET COUNT: CPT

## 2019-05-30 PROCEDURE — 97530 THERAPEUTIC ACTIVITIES: CPT

## 2019-05-30 PROCEDURE — 6370000000 HC RX 637 (ALT 250 FOR IP): Performed by: INTERNAL MEDICINE

## 2019-05-30 PROCEDURE — 36415 COLL VENOUS BLD VENIPUNCTURE: CPT

## 2019-05-30 RX ORDER — ATENOLOL 25 MG/1
25 TABLET ORAL 2 TIMES DAILY
Status: DISCONTINUED | OUTPATIENT
Start: 2019-05-30 | End: 2019-06-04 | Stop reason: HOSPADM

## 2019-05-30 RX ADMIN — ATENOLOL 25 MG: 25 TABLET ORAL at 20:57

## 2019-05-30 RX ADMIN — ACETAMINOPHEN 500 MG: 500 TABLET ORAL at 14:45

## 2019-05-30 RX ADMIN — SULFASALAZINE 500 MG: 500 TABLET ORAL at 20:57

## 2019-05-30 RX ADMIN — LEVOTHYROXINE SODIUM 100 MCG: 100 TABLET ORAL at 07:00

## 2019-05-30 RX ADMIN — ATENOLOL 25 MG: 25 TABLET ORAL at 09:35

## 2019-05-30 RX ADMIN — HYDROCODONE BITARTRATE AND ACETAMINOPHEN 1 TABLET: 5; 325 TABLET ORAL at 04:10

## 2019-05-30 RX ADMIN — ASPIRIN 81 MG: 81 TABLET, COATED ORAL at 09:35

## 2019-05-30 RX ADMIN — SULFASALAZINE 500 MG: 500 TABLET ORAL at 09:35

## 2019-05-30 RX ADMIN — LEFLUNOMIDE 20 MG: 10 TABLET ORAL at 09:35

## 2019-05-30 RX ADMIN — ZOLPIDEM TARTRATE 5 MG: 5 TABLET ORAL at 20:59

## 2019-05-30 RX ADMIN — PANTOPRAZOLE SODIUM 40 MG: 40 TABLET, DELAYED RELEASE ORAL at 09:35

## 2019-05-30 RX ADMIN — LOSARTAN POTASSIUM 50 MG: 25 TABLET, FILM COATED ORAL at 09:35

## 2019-05-30 ASSESSMENT — PAIN SCALES - GENERAL
PAINLEVEL_OUTOF10: 5
PAINLEVEL_OUTOF10: 0

## 2019-05-30 NOTE — PLAN OF CARE
Pt encouraged to turn/change positions every two hours to prevent skin breakdown. Staff assisting with repositioning. Soft pillow utilized for bony prominences. Sacral mepilex remains in place. Barrier cream used intermittently.

## 2019-05-30 NOTE — PROGRESS NOTES
4 Eyes Skin Assessment     The patient is being assess for   Shift Handoff    I agree that 2 RN's have performed a thorough Head to Toe Skin Assessment on the patient. ALL assessment sites listed below have been assessed. Areas assessed by both nurses:   [x]   Head, Face, and Ears   [x]   Shoulders, Back, and Chest, Abdomen  [x]   Arms, Elbows, and Hands   [x]   Coccyx, Sacrum, and Ischium  [x]   Legs, Feet, and Heels                Co-signer eSignature: Electronically signed by Payton Peterson RN on 5/30/19 at 7:14 AM    Does the Patient have Skin Breakdown?   Yes LDA WOUND CARE was Initiated documentation include the Ghazal-wound, Wound Assessment, Measurements, Dressing Treatment, Drainage, and Color\",          Yoav Prevention initiated:  Yes   Wound Care Orders initiated:  Yes      [address]  nurse consulted for Pressure Injury (Stage 3,4, Unstageable, DTI, NWPT, Complex wounds)and New or Established Ostomies:  No      Primary Nurse eSignature: Electronically signed by Jewels Lockwood RN on 5/30/19 at 8:10 AM

## 2019-05-30 NOTE — PROGRESS NOTES
Physical Therapy  Facility/Department: [place] [address] - MED SURG/ORTHO  Daily Treatment Note  NAME: Julita Sy  : 1927  MRN: [id number]    Date of Service: 2019    Discharge Recommendations:  Continue to assess pending progress        Patient Diagnosis(es): The primary encounter diagnosis was Leg swelling. Diagnoses of Cannot walk, Thyroid disease, Arthritis, and Elevated d-dimer were also pertinent to this visit. has a past medical history of Arthritis, Arthritis, CAD (coronary artery disease), GERD (gastroesophageal reflux disease), Hyperlipidemia, Hypertension, and Thyroid disease. has a past surgical history that includes joint replacement; Cholecystectomy; Coronary angioplasty with stent; and pr esophagogastroduodenoscopy transoral diagnostic (N/A, 2018). Restrictions  Restrictions/Precautions  Restrictions/Precautions: Fall Risk, General Precautions  Required Braces or Orthoses?: Yes(orthopedic shoes)  Required Braces or Orthoses  Right Lower Extremity Brace: Ankle Foot Orthotics  Position Activity Restriction  Other position/activity restrictions: High fall risk; up with assist  Subjective   General  Chart Reviewed: Yes  Referring Practitioner: Carina Mauro CNP  Pain Screening  Patient Currently in Pain: Denies  Vital Signs  Patient Currently in Pain: Denies        Dona RN cleared pt for bed exercises only. Orientation  Orientation  Overall Orientation Status: Within Normal Limits  Cognition      Objective   Bed mobility  Comment: Pt on  bed rest oders  Transfers  Comment: Pt on  bed rest oders  Ambulation  Ambulation?: No        Exercises  Straight Leg Raise: 10 R  Quad Sets: 10 B  Heelslides: 10 R  Gluteal Sets: 10  Hip Abduction: 10 R  Knee Short Arc Quad: 10 R  Ankle Pumps: 10 R       Assessment   Body structures, Functions, Activity limitations: Decreased functional mobility ; Decreased endurance;Decreased strength;Decreased ROM  Assessment: 81 yo female adm with Gait disturbance, LLE edema. PLOF Assist for ADLS, amb indep with crutches, AFO, orthopedic shoes. Today pt on strict bed rest orders. Will cont to assess discharge recs pending gait eval.  Treatment Diagnosis: decreased gait and mobility  Prognosis: Good  Patient Education: role of PT,s afety  REQUIRES PT FOLLOW UP: Yes  Activity Tolerance  Activity Tolerance: Patient Tolerated treatment well     AM-PAC Score  AM-PAC Inpatient Mobility Raw Score : 16  AM-PAC Inpatient T-Scale Score : 40.78  Mobility Inpatient CMS 0-100% Score: 54.16  Mobility Inpatient CMS G-Code Modifier : CK          Goals  Short term goals  Time Frame for Short term goals: 1 week(6/4) unless otherwise specified  Short term goal 1: pt to be modified indep with bed mob  Short term goal 2: pt to be CG for transfers  Short term goal 3: pt to amb with crutches, AFO, orthopedic shoes and  ft  Short term goal 4: pt to participate in 10 reps LE ex by 6/1  Patient Goals   Patient goals : \"To walk\"    Plan    Plan  Times per week: 3-5 x wk  Specific instructions for Next Treatment: ex, mobility  Current Treatment Recommendations: Strengthening, Gait Training, Stair training, Balance Training, Functional Mobility Training, Transfer Training, Safety Education & Training  Safety Devices  Type of devices:  All fall risk precautions in place, Left in bed, Call light within reach, Patient at risk for falls, Nurse notified, Bed alarm in place     Therapy Time   Individual Concurrent Group Co-treatment   Time In 1205         Time Out 1220         Minutes 15         Timed Code Treatment Minutes: [address]

## 2019-05-30 NOTE — PROGRESS NOTES
Occupational Therapy  Facility/Department: [facility] C5 - MED SURG/ORTHO  Daily Treatment Note  NAME: Pilar Mcqueen  : 1927  MRN: [id number]    Date of Service: 2019    Discharge Recommendations:  Continue to assess pending progress(CTA pending further mobility tomorrow)     Assessment   Performance deficits / Impairments: Decreased functional mobility ; Decreased ADL status; Decreased balance  Assessment: Pt agreeable to therapy. Pt eager to get out of bed and requesting bathroom. Pt attempted to stand from EOB using crutches with Max A and minimal glute clearance. Michael erickson used for mobility to bathroom. Pt completed toilet transfer Min A and Max A for toileting. Pt with mild decrease in activity tolerance likely 2/2 to bedrest the past two days. Pt will benefit from continued skilled OT. Decision Making: Low Complexity  Patient Education: role of OT, safety, ADLs, functional mobility  REQUIRES OT FOLLOW UP: Yes  Activity Tolerance  Activity Tolerance: Patient Tolerated treatment well;Treatment limited secondary to medical complications (free text)  Activity Tolerance: Pts BP 98/60 after transfer to chair; Pt with mild dizziness but reported it was resolving. RN aware. Safety Devices  Type of devices: Call light within reach; Chair alarm in place; Left in chair;Nurse notified;Gait belt       Patient Diagnosis(es): The primary encounter diagnosis was Leg swelling. Diagnoses of Cannot walk, Thyroid disease, Arthritis, and Elevated d-dimer were also pertinent to this visit. has a past medical history of Arthritis, Arthritis, CAD (coronary artery disease), GERD (gastroesophageal reflux disease), Hyperlipidemia, Hypertension, and Thyroid disease. has a past surgical history that includes joint replacement; Cholecystectomy; Coronary angioplasty with stent; and pr esophagogastroduodenoscopy transoral diagnostic (N/A, 2018).     Restrictions  Restrictions/Precautions  Restrictions/Precautions: Fall Risk, General Precautions  Required Braces or Orthoses?: Yes(orthopedic shoes)  Required Braces or Orthoses  Right Lower Extremity Brace: Ankle Foot Orthotics  Position Activity Restriction  Other position/activity restrictions: High fall risk; up with assist  Subjective   General  Chart Reviewed: Yes  Patient assessed for rehabilitation services?: Yes  Family / Caregiver Present: No  Referring Practitioner: RAMSES Crabtree CNP  Diagnosis: gait disturbance, Left LE edema  Subjective  Subjective: \"I thought I was going to be able to go home today\"  General Comment  Comments: Per RN ok for therapy  Vital Signs  Patient Currently in Pain: Denies   Orientation  Orientation  Overall Orientation Status: Within Functional Limits  Objective    ADL  LE Dressing: Maximum assistance(for socks and R AFO; unable to don L orthopedic shoe 2/2 edema)  Toileting: Maximum assistance(assist to manage brief and complete pericare following BM)     Balance  Sitting Balance: Supervision  Standing Balance: Contact guard assistance(in indu dre)  Standing Balance  Time: ~10 sec, ~30 sec, ~10 sec  Activity: bed>toilet>chair  Comment: indu erickson  Functional Mobility  Functional - Mobility Device: (indu erickson)  Activity: To/from bathroom; Other  Assist Level: Contact guard assistance  Toilet Transfers  Toilet - Technique: Ambulating  Equipment Used: Standard toilet  Toilet Transfer: Minimal assistance  Toilet Transfers Comments: indu erickson  Bed mobility  Supine to Sit: Moderate assistance  Sit to Supine: Unable to assess(seated in chair at end of session)  Scooting: Stand by assistance  Transfers  Sit to stand: Minimal assistance  Stand to sit: Minimal assistance  Transfer Comments: indu erickson     Cognition  Overall Cognitive Status: WFL     LUE AROM (degrees)  LUE General AROM: limited for finger extension & limited to 90* shoulder flexion  RUE AROM (degrees)  RUE General AROM: limited for finger extension & limited to 90* shoulder

## 2019-05-30 NOTE — PROGRESS NOTES
Hospitalist Progress Note      PCP: Mindy Hernandez MD    Date of Admission: 5/27/2019      Chief Complaint:  Left leg swelling        Hospital Course:        Subjective: notes ongoing LLE swelling, daughter currently at bedside ,ace wrap placed           Medications:  Reviewed    Infusion Medications    heparin (porcine) 8.3 mL/hr (05/29/19 2229)     Scheduled Medications    aspirin  81 mg Oral Daily    atenolol  25 mg Oral Daily    leflunomide  20 mg Oral Daily    levothyroxine  100 mcg Oral Daily    losartan  50 mg Oral Daily    pantoprazole  40 mg Oral Daily    sulfaSALAzine  500 mg Oral BID    sodium chloride flush  10 mL Intravenous 2 times per day     PRN Meds: zolpidem, heparin (porcine), heparin (porcine), HYDROcodone-acetaminophen, sodium chloride flush, magnesium hydroxide, ondansetron, acetaminophen      Intake/Output Summary (Last 24 hours) at 5/30/2019 0933  Last data filed at 5/30/2019 0700  Gross per 24 hour   Intake 438.09 ml   Output 1450 ml   Net -1011.91 ml       Physical Exam Performed:    /70   Pulse 76   Temp 98.3 °F (36.8 °C) (Oral)   Resp 18   Ht 5' 2\" (1.575 m)   Wt 160 lb (72.6 kg)   SpO2 91%   BMI 29.26 kg/m²       General appearance:  No apparent distress, appears stated age and cooperative. HEENT:  Normal cephalic, atraumatic without obvious deformity. Pupils equal, round, and reactive to light.  Extra ocular muscles intact. Conjunctivae/corneas clear. Neck: Supple, with full range of motion. No jugular venous distention. Trachea midline. Respiratory:  Normal respiratory effort. dim to auscultation, bilaterally without Rales/Wheezes/Rhonchi. Cardiovascular:  Regular rate and rhythm with normal S1/S2 without murmurs, rubs or gallops. Abdomen: Soft, non-tender, non-distended with normal bowel sounds. Musculoskeletal:  No clubbing, cyanosis.  2+ left lower extremity edema(now with ace wrap).  dec range of motion with deformity to right foot. Rheumatic joints noted  Skin: Skin color, texture, turgor normal.  No rashes or lesions. Neurologic:  Neurovascularly intact without any focal sensory/motor deficits. Cranial nerves: II-XII intact, grossly non-focal.  Psychiatric:  Alert and oriented, thought content appropriate, normal insight  Capillary Refill: Brisk,< 3 seconds   Peripheral Pulses: +2 palpable, equal bilaterally              Labs:   Recent Labs     05/27/19  1946 05/28/19  1738 05/30/19  0420   WBC 5.9 5.8  --    HGB 11.3* 11.9*  --    HCT 34.3* 36.1  --     181 184     Recent Labs     05/27/19 1946      K 4.0      CO2 28   BUN 17   CREATININE <0.5*   CALCIUM 9.1     Recent Labs     05/27/19 1946   AST 25   ALT 15   BILITOT 0.5   ALKPHOS 97     Recent Labs     05/27/19 1946   INR 1.02     No results for input(s): Gardner Rockland in the last 72 hours.     Urinalysis:      Lab Results   Component Value Date    NITRU POSITIVE 05/28/2019    WBCUA 0-2 05/28/2019    BACTERIA 4+ 05/28/2019    RBCUA 3-5 05/28/2019    BLOODU TRACE-LYSED 05/28/2019    SPECGRAV <=1.005 05/28/2019    GLUCOSEU Negative 05/28/2019       Radiology:  VL Extremity Venous Left   Final Result              Assessment/Plan:    Active Hospital Problems    Diagnosis    DVT of deep femoral vein, left (HCC) [I82.412]    Gait disturbance [R26.9]    Hypertension [I10]    Hyperlipidemia [E78.5]        Gait disturbance, possibly from DVT to left lower extremity  1 dose of Eliquis given in the emergency room, noted ddimer of 4075  Ultrasound of left lower extremity noted extensive dvt  Echo in am done(ef 55%, no wm abn, mild lvh)  -vas surg consulted, no intervention at this time, no phlegmasia, ace wrap started, rec zip up compression stockings   -R>B for intervention given age  -started heparin ggt     HTN  BPs have been controlled  Continue current anti hypertensive regimen, titrate dose as indicated  Reinforced and educated patient about weight loss/ lifestyle changes/ aerobic exercise/ low sodium and DASH diet      RA- continued home sulfasalazine and arava     Hypothyroid- stable, on synthroid        DVT Prophylaxis: heparin ggt  Diet: DIET CARDIAC; Low Sodium (2 GM)  Code Status: Full Code    PT/OT Eval Status: pending    Dispo - pending pt/ot eval, obtain stockings, family recs on [facility], tomorrow?     Efraín Mejia MD

## 2019-05-31 LAB — APTT: 64.5 SEC (ref 26–36)

## 2019-05-31 PROCEDURE — 6360000002 HC RX W HCPCS: Performed by: INTERNAL MEDICINE

## 2019-05-31 PROCEDURE — 1200000000 HC SEMI PRIVATE

## 2019-05-31 PROCEDURE — 97530 THERAPEUTIC ACTIVITIES: CPT

## 2019-05-31 PROCEDURE — 6370000000 HC RX 637 (ALT 250 FOR IP): Performed by: INTERNAL MEDICINE

## 2019-05-31 PROCEDURE — 97116 GAIT TRAINING THERAPY: CPT

## 2019-05-31 PROCEDURE — 36415 COLL VENOUS BLD VENIPUNCTURE: CPT

## 2019-05-31 PROCEDURE — 2580000003 HC RX 258: Performed by: NURSE PRACTITIONER

## 2019-05-31 PROCEDURE — 6370000000 HC RX 637 (ALT 250 FOR IP): Performed by: NURSE PRACTITIONER

## 2019-05-31 PROCEDURE — 97535 SELF CARE MNGMENT TRAINING: CPT

## 2019-05-31 PROCEDURE — 97110 THERAPEUTIC EXERCISES: CPT

## 2019-05-31 PROCEDURE — 85730 THROMBOPLASTIN TIME PARTIAL: CPT

## 2019-05-31 RX ADMIN — ASPIRIN 81 MG: 81 TABLET, COATED ORAL at 09:20

## 2019-05-31 RX ADMIN — SODIUM CHLORIDE, PRESERVATIVE FREE 10 ML: 5 INJECTION INTRAVENOUS at 21:03

## 2019-05-31 RX ADMIN — LEVOTHYROXINE SODIUM 100 MCG: 100 TABLET ORAL at 06:17

## 2019-05-31 RX ADMIN — ATENOLOL 25 MG: 25 TABLET ORAL at 09:21

## 2019-05-31 RX ADMIN — HYDROCODONE BITARTRATE AND ACETAMINOPHEN 1 TABLET: 5; 325 TABLET ORAL at 09:42

## 2019-05-31 RX ADMIN — APIXABAN 10 MG: 5 TABLET, FILM COATED ORAL at 18:24

## 2019-05-31 RX ADMIN — ZOLPIDEM TARTRATE 5 MG: 5 TABLET ORAL at 21:03

## 2019-05-31 RX ADMIN — LEFLUNOMIDE 20 MG: 10 TABLET ORAL at 09:21

## 2019-05-31 RX ADMIN — PANTOPRAZOLE SODIUM 40 MG: 40 TABLET, DELAYED RELEASE ORAL at 09:22

## 2019-05-31 RX ADMIN — SULFASALAZINE 500 MG: 500 TABLET ORAL at 21:03

## 2019-05-31 RX ADMIN — SULFASALAZINE 500 MG: 500 TABLET ORAL at 09:23

## 2019-05-31 RX ADMIN — HEPARIN SODIUM AND DEXTROSE 8.3 ML/HR: 10000; 5 INJECTION INTRAVENOUS at 11:36

## 2019-05-31 RX ADMIN — ATENOLOL 25 MG: 25 TABLET ORAL at 21:06

## 2019-05-31 RX ADMIN — LOSARTAN POTASSIUM 50 MG: 25 TABLET, FILM COATED ORAL at 09:22

## 2019-05-31 ASSESSMENT — PAIN DESCRIPTION - PAIN TYPE: TYPE: ACUTE PAIN

## 2019-05-31 ASSESSMENT — PAIN DESCRIPTION - LOCATION: LOCATION: WRIST

## 2019-05-31 ASSESSMENT — PAIN DESCRIPTION - ORIENTATION: ORIENTATION: RIGHT

## 2019-05-31 ASSESSMENT — PAIN SCALES - GENERAL
PAINLEVEL_OUTOF10: 0
PAINLEVEL_OUTOF10: 1
PAINLEVEL_OUTOF10: 4

## 2019-05-31 NOTE — CARE COORDINATION
PT/OT recs placed for therapy 3-5x/week. Pt currently assist x2 with ellen chiquita. Referral faxed to [phone]; Dinora Grullon in admissions approved pt for admission pending precert which will be initiated today.      Any Garnett RN

## 2019-05-31 NOTE — PROGRESS NOTES
with ace wrap). dec range of motion with deformity to right foot. Rheumatic joints noted  Skin: Skin color, texture, turgor normal.  No rashes or lesions. Neurologic:  Neurovascularly intact without any focal sensory/motor deficits. Cranial nerves: II-XII intact, grossly non-focal.  Psychiatric:  Alert and oriented, thought content appropriate, normal insight  Capillary Refill: Brisk,< 3 seconds   Peripheral Pulses: +2 palpable, equal bilaterally              Labs:   Recent Labs     05/28/19  1738 05/30/19  0420   WBC 5.8  --    HGB 11.9*  --    HCT 36.1  --     184     No results for input(s): NA, K, CL, CO2, BUN, CREATININE, CALCIUM, PHOS in the last 72 hours. Invalid input(s): MAGNES  No results for input(s): AST, ALT, BILIDIR, BILITOT, ALKPHOS in the last 72 hours. No results for input(s): INR in the last 72 hours. No results for input(s): Dareen Serge in the last 72 hours. Urinalysis:      Lab Results   Component Value Date    NITRU POSITIVE 05/28/2019    WBCUA 0-2 05/28/2019    BACTERIA 4+ 05/28/2019    RBCUA 3-5 05/28/2019    BLOODU TRACE-LYSED 05/28/2019    SPECGRAV <=1.005 05/28/2019    GLUCOSEU Negative 05/28/2019       Radiology:  VL Extremity Venous Left   Final Result              Assessment/Plan:    Active Hospital Problems    Diagnosis    DVT of deep femoral vein, left (HCC) [I82.412]    Gait disturbance [R26.9]    Hypertension [I10]    Hyperlipidemia [E78.5]     Gait disturbance, possibly from DVT to left lower extremity  1 dose of Eliquis given in the emergency room, noted ddimer of 4075  Ultrasound of left lower extremity noted extensive dvt  Echo in am done(ef 55%, no wm abn, mild lvh)  -vas surg consulted, no intervention at this time, no phlegmasia, ace wrap started, rec zip up compression stockings   -R>B for intervention given age  -started heparin ggt.  Switched to eliquis     HTN  BPs have been controlled  Continue current anti hypertensive regimen, titrate dose as

## 2019-05-31 NOTE — PROGRESS NOTES
Physical Therapy  Facility/Department: [place] [address] - MED SURG/ORTHO  Daily Treatment Note  NAME: Monie Barillas  : 1927  MRN: [id number]    Date of Service: 2019    Discharge Recommendations:  Continue to assess pending progress        Patient Diagnosis(es): The primary encounter diagnosis was Leg swelling. Diagnoses of Cannot walk, Thyroid disease, Arthritis, and Elevated d-dimer were also pertinent to this visit. has a past medical history of Arthritis, Arthritis, CAD (coronary artery disease), GERD (gastroesophageal reflux disease), Hyperlipidemia, Hypertension, and Thyroid disease. has a past surgical history that includes joint replacement; Cholecystectomy; Coronary angioplasty with stent; and pr esophagogastroduodenoscopy transoral diagnostic (N/A, 2018). Restrictions  Restrictions/Precautions  Restrictions/Precautions: Fall Risk, General Precautions  Required Braces or Orthoses?: Yes(ortho shoe)  Required Braces or Orthoses  Right Lower Extremity Brace: Ankle Foot Orthotics  Position Activity Restriction  Other position/activity restrictions: High fall risk; up with assist  Subjective   General  Pt agrees to PT.  RN cleared for treatment  Chart Reviewed: Yes  Referring Practitioner: Nancy Gifford CNP  Pain Screening  Patient Currently in Pain: Denies  Vital Signs  Patient Currently in Pain: Denies       Orientation  Orientation  Overall Orientation Status: Within Normal Limits  Cognition      Objective   Bed mobility  Supine to Sit: Minimal assistance; Moderate assistance  Transfers  Sit to Stand: Minimal Assistance  Stand to sit: Minimal Assistance  Ambulation  Ambulation?: Yes  More Ambulation?: Yes  Ambulation 1  Device: Axillary Crutches  Assistance: 2 Person assistance;Minimal assistance  Gait Deviations: Slow Rose;Decreased step length;Decreased step height  Distance: Pt amb w/ slow rose, decreased steadiness and stride, limited by fatigue  Comments: 6 ft fwd/5 ft bwd (chair brought closer b/f sitting)  Ambulation 2  Surface - 2: level tile  Device 2: Rolling Walker  Assistance 2: 2 Person assistance;Minimal assistance  Quality of Gait 2: Pt amb w/ slow rose, improved steadiness compared to crutches,  and increased stride, but required assist advancing,directing rw, limited by fatigue  Distance: 6 ft fwd/5 ft bwd (chair brought closer b/f sitting)     Balance  Comments: Sitting Balance: Supervision  Standing Balance: CG to min assistance  Exercises  Straight Leg Raise: 10 R  Quad Sets: 10 B  Heelslides: 10 R  Gluteal Sets: 10  Hip Abduction: 10 R  Knee Long Arc Quad: 10 R  Knee Short Arc Quad: 10 R  Ankle Pumps: 10 R         Comment: toilet transfers min x 2 in indu erickson; Standing Balance  Time: <1 minute, ~1 minute, 2-3 minutes over multiple trials      Assessment   Body structures, Functions, Activity limitations: Decreased functional mobility ; Decreased endurance;Decreased strength;Decreased ROM  Assessment: 81 yo female adm with Gait disturbance, LLE edema. PLOF Assist for ADLS, amb indep with crutches, AFO, orthopedic shoes. Requires Ax 2 for ambulation and for some transfers, especially as she fatigues early with tasks.   Treatment Diagnosis: decreased gait and mobility  Prognosis: Good  Patient Education: role of PT,s safety  REQUIRES PT FOLLOW UP: Yes  Activity Tolerance  Activity Tolerance: Patient Tolerated treatment well;Patient limited by endurance     AM-PAC Score  AM-PAC Inpatient Mobility Raw Score : 15  AM-PAC Inpatient T-Scale Score : 39.45  Mobility Inpatient CMS 0-100% Score: 57.7  Mobility Inpatient CMS G-Code Modifier : CK          Goals  Short term goals  Time Frame for Short term goals: 1 week(6/4) unless otherwise specified  Short term goal 1: pt to be modified indep with bed mob  Short term goal 2: pt to be CG for transfers  Short term goal 3: pt to amb with crutches, AFO, orthopedic shoes and  ft  Short term goal 4: pt to participate in 10 reps LE ex by 6/1  Patient Goals   Patient goals : \"To walk\"    Plan    Plan  Times per week: 3-5 x wk  Specific instructions for Next Treatment: ex, mobility  Current Treatment Recommendations: Strengthening, Gait Training, Stair training, Balance Training, Functional Mobility Training, Transfer Training, Safety Education & Training  Safety Devices  Type of devices:  All fall risk precautions in place, Call light within reach, Patient at risk for falls, Nurse notified, Left in chair, Chair alarm in place, Gait belt     Therapy Time   Individual Concurrent Group Co-treatment   Time In 1330         Time Out 1425         Minutes 55         Timed Code Treatment Minutes: 1211 [facility]

## 2019-05-31 NOTE — CONSULTS
Nutrition Note:    Subjective: False +screen for low yoav score: Nutrition care recieved consult for low yoav score. Yoav score is 15, nutrition score is 0, patient does not have any pressure ulcers or wt loss present and is eating well per EMR. The patient will still be monitored per nutrition standards of care. Consult dietitian if additional nutrition intervention is needed. Neither the total Yoav score nor the nutrition subscale score have been independently validated for use as a tool to predict risk of malnutrition. Many have concluded that the Yoav scale is highly overpredictive of actual pressure injury development. In other words, not all patients who are predicted to develop a pressure ulcer injury actually develop one.  This overprediction could be seen as a major flaw of the yoav scale, weakening its practical utility overall, especially as it relates to nutrition screening and referral to the RDN. ([place]. Academy of [employer] and Dietetics, 2017)    Edi Cuevas RD, LD  Pager:  [phone]  Office:  [phone]

## 2019-05-31 NOTE — PROGRESS NOTES
Occupational Therapy  Facility/Department: [place] [address] - MED SURG/ORTHO  Daily Treatment Note  NAME: Magali Mahmood  : 1927  MRN: [id number]    Date of Service: 2019    Discharge Recommendations:  Subacute/Skilled Nursing Facility  OT Equipment Recommendations  Equipment Needed: No  Other: defer to next level of care. Continue to assess should pt d/c home. Assessment   Performance deficits / Impairments: Decreased functional mobility ; Decreased ADL status; Decreased balance;Decreased safe awareness;Decreased endurance;Decreased strength  Assessment: Pt pleasant and agreeable to treatment, demonstrating ability to complete mobility with use of crutches and willing to attempt use of RW this date, with improved level of assist with use of RW. Recommend further therapy at d/c to increase safety/independence with ADL, functional mobility/transfers and further assessment of device to use for mobility. Continue OT tx. Patient Education: safety  REQUIRES OT FOLLOW UP: Yes  Activity Tolerance  Activity Tolerance: Patient Tolerated treatment well;Patient limited by fatigue  Safety Devices  Safety Devices in place: Yes  Type of devices: Call light within reach; Chair alarm in place; Left in chair;Nurse notified;Gait belt         Patient Diagnosis(es): The primary encounter diagnosis was Leg swelling. Diagnoses of Cannot walk, Thyroid disease, Arthritis, and Elevated d-dimer were also pertinent to this visit. has a past medical history of Arthritis, Arthritis, CAD (coronary artery disease), GERD (gastroesophageal reflux disease), Hyperlipidemia, Hypertension, and Thyroid disease. has a past surgical history that includes joint replacement; Cholecystectomy; Coronary angioplasty with stent; and pr esophagogastroduodenoscopy transoral diagnostic (N/A, 2018).     Restrictions  Restrictions/Precautions  Restrictions/Precautions: Fall Risk, General Precautions  Required Braces or Orthoses?: Yes(orthopedic shoes)  Required Braces or Orthoses  Right Lower Extremity Brace: Ankle Foot Orthotics  Position Activity Restriction  Other position/activity restrictions: High fall risk; up with assist  Subjective   General  Chart Reviewed: Yes  Patient assessed for rehabilitation services?: Yes  Family / Caregiver Present: Yes(Pt's  and daughter)  Referring Practitioner: RAMSES Person CNP  Diagnosis: gait disturbance, Left LE edema  Subjective  Subjective: Pt in bed on arrival, agreeable to treatment, with request to use restroom  General Comment  Comments: Per RN okay to treat  Vital Signs  Patient Currently in Pain: Denies   Orientation  Orientation  Overall Orientation Status: Within Functional Limits  Objective    ADL  Grooming: Setup(seated in STEDY to wash hands, face)  LE Dressing: Maximum assistance(to doff non-skid sock/don sock, don R AFO and L post-op shoe)  Toileting: Dependent/Total(Assist for clothing management upon sitting and rising, assist for bladder hygiene)        Balance  Sitting Balance: Supervision  Standing Balance: Minimal assistance  Standing Balance  Time: <1 minute, ~1 minute, 2-3 minutes over multiple trials  Activity: mobility, transfers, standing ADL   Functional Mobility  Functional - Mobility Device: Rolling Walker  Activity: Other(forward/backward steps from chair)  Assist Level: Dependent/Total(Min A x2)  Functional Mobility Comments: Mod A x2 with use of crutches, improved level of assist with RW. Discussed with pt and  verbalizing understanding.   Toilet Transfers  Toilet - Technique: (via STEDY)  Equipment Used: Standard toilet  Toilet Transfer: Dependent/Total  Bed mobility  Sit to Supine: Unable to assess(pt left up in chair)  Transfers  Sit to stand: Minimal assistance  Stand to sit: Minimal assistance         Cognition  Overall Cognitive Status: [facility]     Plan   Plan  Times per week: 3-5x/ week   Current Treatment Recommendations: Balance Training, Functional Mobility Training, Safety Education & Training, Strengthening, Patient/Caregiver Education & Training, Equipment Evaluation, Education, & procurement, Self-Care / ADL, Positioning, Endurance Training    AM-PAC Score   AM-PAC Inpatient Daily Activity Raw Score: 12  AM-PAC Inpatient ADL T-Scale Score : 30.6  ADL Inpatient CMS 0-100% Score: 66.57  ADL Inpatient CMS G-Code Modifier : CL    Goals  Short term goals  Time Frame for Short term goals: 3-5 days  Short term goal 1: supervision with bathroom mobility by 6-02-19  Short term goal 2: supervision standing ADL's for 5 minutes   Short term goal 3: independent with toileting  Patient Goals   Patient goals : go home       Therapy Time   Individual Concurrent Group Co-treatment   Time In 1340         Time Out 1419         Minutes 39         Timed Code Treatment Minutes: 39 Minutes     This note to serve as d/c summary should pt d/c prior to next session.     Brenton White OTR/L

## 2019-05-31 NOTE — CARE COORDINATION
Per Primary RN, pt would like placement with [facility] SNF. [facility]; unsure of need for SNF placement at this time. Awaiting PT/OT recommendations. Will follow.      Moriah Saldivar RN

## 2019-06-01 LAB
APTT: 34.8 SEC (ref 26–36)
PLATELET # BLD: 157 K/UL (ref 135–450)

## 2019-06-01 PROCEDURE — 97530 THERAPEUTIC ACTIVITIES: CPT

## 2019-06-01 PROCEDURE — 1200000000 HC SEMI PRIVATE

## 2019-06-01 PROCEDURE — 85730 THROMBOPLASTIN TIME PARTIAL: CPT

## 2019-06-01 PROCEDURE — 97110 THERAPEUTIC EXERCISES: CPT

## 2019-06-01 PROCEDURE — 36415 COLL VENOUS BLD VENIPUNCTURE: CPT

## 2019-06-01 PROCEDURE — 6370000000 HC RX 637 (ALT 250 FOR IP): Performed by: NURSE PRACTITIONER

## 2019-06-01 PROCEDURE — 85049 AUTOMATED PLATELET COUNT: CPT

## 2019-06-01 PROCEDURE — 97535 SELF CARE MNGMENT TRAINING: CPT

## 2019-06-01 PROCEDURE — 2580000003 HC RX 258: Performed by: NURSE PRACTITIONER

## 2019-06-01 PROCEDURE — 6370000000 HC RX 637 (ALT 250 FOR IP): Performed by: INTERNAL MEDICINE

## 2019-06-01 PROCEDURE — 97116 GAIT TRAINING THERAPY: CPT

## 2019-06-01 RX ADMIN — SULFASALAZINE 500 MG: 500 TABLET ORAL at 20:03

## 2019-06-01 RX ADMIN — LOSARTAN POTASSIUM 50 MG: 25 TABLET, FILM COATED ORAL at 09:44

## 2019-06-01 RX ADMIN — APIXABAN 10 MG: 5 TABLET, FILM COATED ORAL at 09:43

## 2019-06-01 RX ADMIN — HYDROCODONE BITARTRATE AND ACETAMINOPHEN 1 TABLET: 5; 325 TABLET ORAL at 20:04

## 2019-06-01 RX ADMIN — SULFASALAZINE 500 MG: 500 TABLET ORAL at 09:44

## 2019-06-01 RX ADMIN — SODIUM CHLORIDE, PRESERVATIVE FREE 10 ML: 5 INJECTION INTRAVENOUS at 09:45

## 2019-06-01 RX ADMIN — PANTOPRAZOLE SODIUM 40 MG: 40 TABLET, DELAYED RELEASE ORAL at 09:44

## 2019-06-01 RX ADMIN — ATENOLOL 25 MG: 25 TABLET ORAL at 09:45

## 2019-06-01 RX ADMIN — LEVOTHYROXINE SODIUM 100 MCG: 100 TABLET ORAL at 06:59

## 2019-06-01 RX ADMIN — ASPIRIN 81 MG: 81 TABLET, COATED ORAL at 09:44

## 2019-06-01 RX ADMIN — LEFLUNOMIDE 20 MG: 10 TABLET ORAL at 09:43

## 2019-06-01 RX ADMIN — APIXABAN 10 MG: 5 TABLET, FILM COATED ORAL at 20:03

## 2019-06-01 RX ADMIN — ACETAMINOPHEN 500 MG: 500 TABLET ORAL at 13:53

## 2019-06-01 RX ADMIN — HYDROCODONE BITARTRATE AND ACETAMINOPHEN 1 TABLET: 5; 325 TABLET ORAL at 09:44

## 2019-06-01 RX ADMIN — SODIUM CHLORIDE, PRESERVATIVE FREE 10 ML: 5 INJECTION INTRAVENOUS at 20:04

## 2019-06-01 RX ADMIN — ZOLPIDEM TARTRATE 5 MG: 5 TABLET ORAL at 22:28

## 2019-06-01 RX ADMIN — ATENOLOL 25 MG: 25 TABLET ORAL at 20:03

## 2019-06-01 ASSESSMENT — PAIN DESCRIPTION - LOCATION
LOCATION: WRIST
LOCATION: WRIST
LOCATION: HAND;WRIST
LOCATION: WRIST

## 2019-06-01 ASSESSMENT — PAIN DESCRIPTION - PAIN TYPE
TYPE: ACUTE PAIN

## 2019-06-01 ASSESSMENT — PAIN SCALES - GENERAL
PAINLEVEL_OUTOF10: 4
PAINLEVEL_OUTOF10: 4
PAINLEVEL_OUTOF10: 0
PAINLEVEL_OUTOF10: 3
PAINLEVEL_OUTOF10: 4
PAINLEVEL_OUTOF10: 2
PAINLEVEL_OUTOF10: 4

## 2019-06-01 ASSESSMENT — PAIN DESCRIPTION - DESCRIPTORS
DESCRIPTORS: ACHING
DESCRIPTORS: ACHING

## 2019-06-01 ASSESSMENT — PAIN DESCRIPTION - ORIENTATION
ORIENTATION: RIGHT

## 2019-06-01 ASSESSMENT — PAIN DESCRIPTION - ONSET: ONSET: ON-GOING

## 2019-06-01 ASSESSMENT — PAIN DESCRIPTION - FREQUENCY: FREQUENCY: CONTINUOUS

## 2019-06-01 NOTE — PROGRESS NOTES
noted  Skin: Skin color, texture, turgor normal.  No rashes or lesions. Neurologic:  Neurovascularly intact without any focal sensory/motor deficits. Cranial nerves: II-XII intact, grossly non-focal.  Psychiatric:  Alert and oriented, thought content appropriate, normal insight  Capillary Refill: Brisk,< 3 seconds   Peripheral Pulses: +2 palpable, equal bilaterally          General appearance: No apparent distress, appears stated age and cooperative. Labs:   Recent Labs     05/30/19  0420 06/01/19  0557    157     No results for input(s): NA, K, CL, CO2, BUN, CREATININE, CALCIUM, PHOS in the last 72 hours. Invalid input(s): MAGNES  No results for input(s): AST, ALT, BILIDIR, BILITOT, ALKPHOS in the last 72 hours. No results for input(s): INR in the last 72 hours. No results for input(s): Gemini Suzanne in the last 72 hours. Urinalysis:      Lab Results   Component Value Date    NITRU POSITIVE 05/28/2019    WBCUA 0-2 05/28/2019    BACTERIA 4+ 05/28/2019    RBCUA 3-5 05/28/2019    BLOODU TRACE-LYSED 05/28/2019    SPECGRAV <=1.005 05/28/2019    GLUCOSEU Negative 05/28/2019       Radiology:  VL Extremity Venous Left   Final Result              Assessment/Plan:    Active Hospital Problems    Diagnosis    DVT of deep femoral vein, left (HCC) [I82.412]    Gait disturbance [R26.9]    Hypertension [I10]    Hyperlipidemia [E78.5]     Gait disturbance, possibly from DVT to left lower extremity  1 dose of Eliquis given in the emergency room, noted ddimer of 4075  Ultrasound of left lower extremity noted extensive dvt  Echo in am done(ef 55%, no wm abn, mild lvh)  -vas surg consulted, no intervention at this time, no phlegmasia, ace wrap started, rec zip up compression stockings   -R>B for intervention given age  -started heparin ggt.  Switched to eliquis     HTN  BPs have been controlled  Continue current anti hypertensive regimen, titrate dose as indicated  Reinforced and educated patient about weight loss/ lifestyle changes/ aerobic exercise/ low sodium and DASH diet      RA- continued home sulfasalazine and arava     Hypothyroid- stable, on synthroid           DVT Prophylaxis: eliquis  Diet: DIET CARDIAC; Low Sodium (2 GM)  Code Status: Full Code    PT/OT Eval Status: rec snf    Dispo - ok for dc as of 6/1, pending precert    Macario Park MD

## 2019-06-01 NOTE — PROGRESS NOTES
Occupational Therapy  Facility/Department: [place] [address] - MED SURG/ORTHO  Daily Treatment Note  NAME: Annalee Franco  : 1927  MRN: [id number]    Date of Service: 2019    Discharge Recommendations:  Subacute/Skilled Nursing Facility     Assessment   Performance deficits / Impairments: Decreased functional mobility ; Decreased ADL status; Decreased balance;Decreased safe awareness;Decreased endurance;Decreased strength  Assessment: Pt is functioning below baseline for ADLs and mobility. Fairy Balboa used for bathroom mobility and toilet transfers this session. Recommend SNF for skilled OT services. Cont OT. Prognosis: Fair  Patient Education: role of OT, ADLs, safety  REQUIRES OT FOLLOW UP: Yes  Activity Tolerance  Activity Tolerance: Patient Tolerated treatment well  Safety Devices  Type of devices: Call light within reach; Chair alarm in place; Left in chair;Nurse notified;Gait belt       Patient Diagnosis(es): The primary encounter diagnosis was Leg swelling. Diagnoses of Cannot walk, Thyroid disease, Arthritis, and Elevated d-dimer were also pertinent to this visit. has a past medical history of Arthritis, Arthritis, CAD (coronary artery disease), GERD (gastroesophageal reflux disease), Hyperlipidemia, Hypertension, and Thyroid disease. has a past surgical history that includes joint replacement; Cholecystectomy; Coronary angioplasty with stent; and pr esophagogastroduodenoscopy transoral diagnostic (N/A, 2018). Restrictions  Restrictions/Precautions  Restrictions/Precautions: Fall Risk, General Precautions  Required Braces or Orthoses?: Yes  Required Braces or Orthoses  Right Lower Extremity Brace:  Ankle Foot Orthotics  Position Activity Restriction  Other position/activity restrictions: High fall risk; up with assist  Subjective   General  Chart Reviewed: Yes  Patient assessed for rehabilitation services?: Yes  Response to previous treatment: Patient with no complaints from previous session  Family / Caregiver Present: No  Referring Practitioner: RAMSES Dyson, CNP  Diagnosis: gait disturbance, Left LE edema  Subjective  Subjective: Pt reports R hand edema this am. States that she normally wears a R wrist brace at night. General Comment  Comments: RN approved therapy  Pain Assessment  Pain Level: 4   Orientation  Orientation  Overall Orientation Status: Within Functional Limits  Objective    ADL  Grooming: Setup(seated to wash hands)  LE Dressing: Maximum assistance(for brief)  Toileting: Moderate assistance(Assist for brief and pericare after BM (pt performed front pericare))     Balance  Sitting Balance: Supervision  Standing Balance: Minimal assistance(Stedy )  Functional Mobility  Functional - Mobility Device: [place])  Activity: To/from bathroom  Assist Level: Dependent/Total(Assist x1)  Toilet Transfers  Equipment Used: Standard toilet  Toilet Transfer: Dependent/Total  Toilet Transfers Comments: indu dre     Transfers  Sit to stand:  Moderate assistance(from toilet and chair into Stedy )  Stand to sit: Moderate assistance      Cognition  Overall Cognitive Status: WFL      Type of ROM/Therapeutic Exercise  Type of ROM/Therapeutic Exercise: AROM  Comment: seated in chair (hx R RTC tear)  Exercises  Shoulder Elevation: 10x  Shoulder Flexion: 15x L shoulder  Elbow Flexion: 15x L elbow (IV in R)  Elbow Extension: 15x  Wrist Flexion: 15x  Wrist Extension: 15x  Grasp/Release: 15x      Plan   Plan  Times per week: 3-5x/ week   Current Treatment Recommendations: Balance Training, Functional Mobility Training, Safety Education & Training, Strengthening, Patient/Caregiver Education & Training, Equipment Evaluation, Education, & procurement, Self-Care / ADL, Positioning, Endurance Training  AM-PAC Score   AM-PAC Inpatient Daily Activity Raw Score: 13  AM-PAC Inpatient ADL T-Scale Score : 32.03  ADL Inpatient CMS 0-100% Score: 63.03  ADL Inpatient CMS G-Code Modifier : CL  Goals  Short term goals  Time Frame for Short term goals: 3-5 days  Short term goal 1: supervision with bathroom mobility by 6-02-19  Short term goal 2: supervision standing ADL's for 5 minutes   Short term goal 3: independent with toileting  Patient Goals   Patient goals : go home     Therapy Time   Individual Concurrent Group Co-treatment   Time In 5629         Time Out 1057         Minutes 39         Timed Code Treatment Minutes: [facility] OTR/L

## 2019-06-01 NOTE — PLAN OF CARE
..Bed in lowest position, wheels locked, 2/4 side rails up, nonskid footwear on. Bed/ chair check alarm in place, call light within reach. Pt instructed to call out when needing assistance. Pt stated understanding. Nurse will continue to monitor. Arthur Elkins .Pt scoring pain on 0-10 scale. Pain medications given per MAR. Pt instructed to call out when pain level increasing. Call light within reach. Nurse will continue to reassess and monitor.

## 2019-06-01 NOTE — PROGRESS NOTES
Physical Therapy  Facility/Department: [place] [address] - MED SURG/ORTHO  Daily Treatment Note  NAME: Krystina Webb  : 1927  MRN: [id number]    Date of Service: 2019    Discharge Recommendations:  SNF     Patient Diagnosis(es): The primary encounter diagnosis was Leg swelling. Diagnoses of Cannot walk, Thyroid disease, Arthritis, and Elevated d-dimer were also pertinent to this visit. has a past medical history of Arthritis, Arthritis, CAD (coronary artery disease), GERD (gastroesophageal reflux disease), Hyperlipidemia, Hypertension, and Thyroid disease. has a past surgical history that includes joint replacement; Cholecystectomy; Coronary angioplasty with stent; and pr esophagogastroduodenoscopy transoral diagnostic (N/A, 2018). Restrictions  Restrictions/Precautions  Restrictions/Precautions: Fall Risk, General Precautions  Required Braces or Orthoses?: Yes  Required Braces or Orthoses  Right Lower Extremity Brace:  Ankle Foot Orthotics  Position Activity Restriction  Other position/activity restrictions: High fall risk; up with assist  Subjective   General  Chart Reviewed: Yes  Referring Practitioner: Latonya Plata CNP  Pain Screening  Patient Currently in Pain: Yes  Pain Assessment  Pain Assessment: 0-10  Pain Level: 4  Pain Type: Acute pain  Pain Location: Wrist  Pain Orientation: Right  Vital Signs  Patient Currently in Pain: Yes       Orientation  Orientation  Overall Orientation Status: Within Normal Limits  Objective   Bed mobility  Supine to Sit: CGA  Scooting: Stand by assistance  Transfers  Sit to Stand: Min assist   Stand to sit: Minimal Assistance  Ambulation  More Ambulation?: Yes  Ambulation 1  Device: Rolling Walker  Assistance: Minimal assistance  Gait Deviations: Slow Nati;Decreased step length;Decreased step height; shuffle like gait; unsteady and unsafe   Distance: x2 ft into chair   Balance  Sitting Balance: Supervision    Standing Balance: CG to min assistance  Exercises  [employer] Sets: 15x5 sec B   Gluteal Sets: 15x5 sec B   Hip Abduction: x15 B   Ankle Pumps: x20 B    Static sitting balance with dynamic reaching OOB and below waistline to attempt to tegan AFO x5 mins. Assessment   Assessment: Pt limited due to pain and generalized deconditioning. Presented with poor trunk and LE control during bed mobility with the bed flat needing to use handrails to get EOB. Transfers and gait training were unsteady and unsafe; requiring intermittent assistance for balance and cues throughout. Pt is dependent on therapist to tegan AFO. Specific instructions for Next Treatment: ex, mobility  Prognosis: Good  Patient Education: role of PT,s safety  REQUIRES PT FOLLOW UP: Yes     AM-PAC Score  AM-PAC Inpatient Mobility Raw Score : 12  AM-PAC Inpatient T-Scale Score : 35.33  Mobility Inpatient CMS 0-100% Score: 68.66  Mobility Inpatient CMS G-Code Modifier : CL          Goals  Short term goals  Time Frame for Short term goals: 1 week(6/4) unless otherwise specified  Short term goal 1: pt to be modified indep with bed mob  Short term goal 2: pt to be CG for transfers  Short term goal 3: pt to amb with crutches, AFO, orthopedic shoes and  ft  Short term goal 4: pt to participate in 10 reps LE ex by 6/1  Patient Goals   Patient goals : \"To walk\"    Plan    Plan  Times per week: 3-5 x wk  Specific instructions for Next Treatment: ex, mobility  Current Treatment Recommendations: Strengthening, Gait Training, Stair training, Balance Training, Functional Mobility Training, Transfer Training, Safety Education & Training  Safety Devices  Type of devices:  All fall risk precautions in place, Call light within reach, Patient at risk for falls, Nurse notified, Left in chair, Chair alarm in place, Gait belt     Therapy Time   Individual Concurrent Group Co-treatment   Time In 0915         Time Out 1000         Minutes 45         Timed Code Treatment Minutes: [address], PTA

## 2019-06-02 LAB — APTT: 34 SEC (ref 26–36)

## 2019-06-02 PROCEDURE — 1200000000 HC SEMI PRIVATE

## 2019-06-02 PROCEDURE — 36415 COLL VENOUS BLD VENIPUNCTURE: CPT

## 2019-06-02 PROCEDURE — 6370000000 HC RX 637 (ALT 250 FOR IP): Performed by: NURSE PRACTITIONER

## 2019-06-02 PROCEDURE — 85730 THROMBOPLASTIN TIME PARTIAL: CPT

## 2019-06-02 PROCEDURE — 6370000000 HC RX 637 (ALT 250 FOR IP): Performed by: INTERNAL MEDICINE

## 2019-06-02 PROCEDURE — 2580000003 HC RX 258: Performed by: NURSE PRACTITIONER

## 2019-06-02 RX ADMIN — PANTOPRAZOLE SODIUM 40 MG: 40 TABLET, DELAYED RELEASE ORAL at 09:46

## 2019-06-02 RX ADMIN — ATENOLOL 25 MG: 25 TABLET ORAL at 09:46

## 2019-06-02 RX ADMIN — ATENOLOL 25 MG: 25 TABLET ORAL at 20:18

## 2019-06-02 RX ADMIN — LEFLUNOMIDE 20 MG: 10 TABLET ORAL at 09:47

## 2019-06-02 RX ADMIN — APIXABAN 10 MG: 5 TABLET, FILM COATED ORAL at 20:18

## 2019-06-02 RX ADMIN — SULFASALAZINE 500 MG: 500 TABLET ORAL at 20:18

## 2019-06-02 RX ADMIN — APIXABAN 10 MG: 5 TABLET, FILM COATED ORAL at 09:47

## 2019-06-02 RX ADMIN — LEVOTHYROXINE SODIUM 100 MCG: 100 TABLET ORAL at 09:49

## 2019-06-02 RX ADMIN — SODIUM CHLORIDE, PRESERVATIVE FREE 10 ML: 5 INJECTION INTRAVENOUS at 20:18

## 2019-06-02 RX ADMIN — ASPIRIN 81 MG: 81 TABLET, COATED ORAL at 09:46

## 2019-06-02 RX ADMIN — SULFASALAZINE 500 MG: 500 TABLET ORAL at 09:47

## 2019-06-02 RX ADMIN — SODIUM CHLORIDE, PRESERVATIVE FREE 10 ML: 5 INJECTION INTRAVENOUS at 09:47

## 2019-06-02 RX ADMIN — ZOLPIDEM TARTRATE 5 MG: 5 TABLET ORAL at 21:51

## 2019-06-02 RX ADMIN — LOSARTAN POTASSIUM 50 MG: 25 TABLET, FILM COATED ORAL at 09:47

## 2019-06-02 ASSESSMENT — PAIN SCALES - GENERAL
PAINLEVEL_OUTOF10: 0
PAINLEVEL_OUTOF10: 0
PAINLEVEL_OUTOF10: 1

## 2019-06-02 NOTE — PROGRESS NOTES
Hospitalist Progress Note      PCP: Ruma Charles MD    Date of Admission: 5/27/2019      Chief Complaint:  Left leg swelling        Hospital Course:        Subjective: notes ongoing LLE swelling,no family currently at bedside ,ace wrap placed, not net recv'd compression stocking, sitting in chair          Medications:  Reviewed    Infusion Medications   Scheduled Medications    apixaban  10 mg Oral BID    atenolol  25 mg Oral BID    aspirin  81 mg Oral Daily    leflunomide  20 mg Oral Daily    levothyroxine  100 mcg Oral Daily    losartan  50 mg Oral Daily    pantoprazole  40 mg Oral Daily    sulfaSALAzine  500 mg Oral BID    sodium chloride flush  10 mL Intravenous 2 times per day     PRN Meds: zolpidem, HYDROcodone-acetaminophen, sodium chloride flush, magnesium hydroxide, ondansetron, acetaminophen      Intake/Output Summary (Last 24 hours) at 6/2/2019 0903  Last data filed at 6/2/2019 4478  Gross per 24 hour   Intake 820 ml   Output 400 ml   Net 420 ml       Physical Exam Performed:    /76   Pulse 70   Temp 98.8 °F (37.1 °C) (Axillary)   Resp 16   Ht 5' 2\" (1.575 m)   Wt 160 lb (72.6 kg)   SpO2 91%   BMI 29.26 kg/m²       General appearance:  No apparent distress, appears stated age and cooperative. HEENT:  Normal cephalic, atraumatic without obvious deformity. Pupils equal, round, and reactive to light.  Extra ocular muscles intact. Conjunctivae/corneas clear. Neck: Supple, with full range of motion. No jugular venous distention. Trachea midline. Respiratory:  Normal respiratory effort. dim to auscultation, bilaterally without Rales/Wheezes/Rhonchi. Cardiovascular:  Regular rate and rhythm with normal S1/S2 without murmurs, rubs or gallops. Abdomen: Soft, non-tender, non-distended with normal bowel sounds. Musculoskeletal:  No clubbing, cyanosis.  2+ left lower extremity edema(now with ace wrap).  dec range of motion with deformity to right foot. Rheumatic joints noted  Skin: Skin color, texture, turgor normal.  No rashes or lesions. Neurologic:  Neurovascularly intact without any focal sensory/motor deficits. Cranial nerves: II-XII intact, grossly non-focal.  Psychiatric:  Alert and oriented, thought content appropriate, normal insight  Capillary Refill: Brisk,< 3 seconds   Peripheral Pulses: +2 palpable, equal bilaterally           Labs:   Recent Labs     06/01/19  0557        No results for input(s): NA, K, CL, CO2, BUN, CREATININE, CALCIUM, PHOS in the last 72 hours. Invalid input(s): MAGNES  No results for input(s): AST, ALT, BILIDIR, BILITOT, ALKPHOS in the last 72 hours. No results for input(s): INR in the last 72 hours. No results for input(s): Eward Pong in the last 72 hours. Urinalysis:      Lab Results   Component Value Date    NITRU POSITIVE 05/28/2019    WBCUA 0-2 05/28/2019    BACTERIA 4+ 05/28/2019    RBCUA 3-5 05/28/2019    BLOODU TRACE-LYSED 05/28/2019    SPECGRAV <=1.005 05/28/2019    GLUCOSEU Negative 05/28/2019       Radiology:  VL Extremity Venous Left   Final Result              Assessment/Plan:    Active Hospital Problems    Diagnosis    DVT of deep femoral vein, left (HCC) [I82.412]    Gait disturbance [R26.9]    Hypertension [I10]    Hyperlipidemia [E78.5]     Gait disturbance, possibly from DVT to left lower extremity  1 dose of Eliquis given in the emergency room, noted ddimer of 4075  Ultrasound of left lower extremity noted extensive dvt  Echo in am done(ef 55%, no wm abn, mild lvh)  -vas surg consulted, no intervention at this time, no phlegmasia, ace wrap started, rec zip up compression stockings   -R>B for intervention given age  -started heparin ggt.  Switched to eliquis     HTN  BPs have been controlled  Continue current anti hypertensive regimen, titrate dose as indicated  Reinforced and educated patient about weight loss/ lifestyle changes/ aerobic exercise/ low sodium and DASH diet      RA- continued home sulfasalazine and arava     Hypothyroid- stable, on synthroid    DVT Prophylaxis: eliquis  Diet: DIET CARDIAC; Low Sodium (2 GM)  Code Status: Full Code    PT/OT Eval Status: rec snf      Dispo - ok for dc as of 6/1, pending precert, needs zip up compression stocking prior to Sary Garcia MD

## 2019-06-03 LAB — PLATELET # BLD: 193 K/UL (ref 135–450)

## 2019-06-03 PROCEDURE — 1200000000 HC SEMI PRIVATE

## 2019-06-03 PROCEDURE — 2580000003 HC RX 258: Performed by: NURSE PRACTITIONER

## 2019-06-03 PROCEDURE — 6370000000 HC RX 637 (ALT 250 FOR IP): Performed by: INTERNAL MEDICINE

## 2019-06-03 PROCEDURE — 97110 THERAPEUTIC EXERCISES: CPT

## 2019-06-03 PROCEDURE — 36415 COLL VENOUS BLD VENIPUNCTURE: CPT

## 2019-06-03 PROCEDURE — 6370000000 HC RX 637 (ALT 250 FOR IP): Performed by: NURSE PRACTITIONER

## 2019-06-03 PROCEDURE — 85049 AUTOMATED PLATELET COUNT: CPT

## 2019-06-03 PROCEDURE — 97530 THERAPEUTIC ACTIVITIES: CPT

## 2019-06-03 PROCEDURE — 97535 SELF CARE MNGMENT TRAINING: CPT

## 2019-06-03 PROCEDURE — 97116 GAIT TRAINING THERAPY: CPT

## 2019-06-03 RX ADMIN — SODIUM CHLORIDE, PRESERVATIVE FREE 10 ML: 5 INJECTION INTRAVENOUS at 21:24

## 2019-06-03 RX ADMIN — ATENOLOL 25 MG: 25 TABLET ORAL at 21:24

## 2019-06-03 RX ADMIN — APIXABAN 10 MG: 5 TABLET, FILM COATED ORAL at 21:24

## 2019-06-03 RX ADMIN — SODIUM CHLORIDE, PRESERVATIVE FREE 10 ML: 5 INJECTION INTRAVENOUS at 08:51

## 2019-06-03 RX ADMIN — LEFLUNOMIDE 20 MG: 10 TABLET ORAL at 08:51

## 2019-06-03 RX ADMIN — APIXABAN 10 MG: 5 TABLET, FILM COATED ORAL at 08:50

## 2019-06-03 RX ADMIN — LEVOTHYROXINE SODIUM 100 MCG: 100 TABLET ORAL at 06:00

## 2019-06-03 RX ADMIN — SULFASALAZINE 500 MG: 500 TABLET ORAL at 21:24

## 2019-06-03 RX ADMIN — LOSARTAN POTASSIUM 50 MG: 25 TABLET, FILM COATED ORAL at 08:51

## 2019-06-03 RX ADMIN — PANTOPRAZOLE SODIUM 40 MG: 40 TABLET, DELAYED RELEASE ORAL at 08:50

## 2019-06-03 RX ADMIN — ASPIRIN 81 MG: 81 TABLET, COATED ORAL at 08:50

## 2019-06-03 RX ADMIN — SULFASALAZINE 500 MG: 500 TABLET ORAL at 08:51

## 2019-06-03 RX ADMIN — ATENOLOL 25 MG: 25 TABLET ORAL at 08:50

## 2019-06-03 RX ADMIN — ZOLPIDEM TARTRATE 5 MG: 5 TABLET ORAL at 21:33

## 2019-06-03 ASSESSMENT — PAIN SCALES - GENERAL
PAINLEVEL_OUTOF10: 0
PAINLEVEL_OUTOF10: 0

## 2019-06-03 NOTE — CARE COORDINATION
Spoke with [place] Cecelia from [place] [address]; She states that precert is back and approved for admission. Met with pt and  at bedside. He states that he will be transporting pt and does not want to take her this evening d/t decrease in staffing numbers at facility after 1600.  states that he would like to take pt early in the AM - 11am is agreed upon time. To be clear, both pt and  are refusing ambulance transport or private transport to SNF today. Also reached out to Hilda Bright at [facility] [phone] to find out status of zippered compression stocking. Hilda Bright states that rep with either be at [facility] tomorrow before 11am or at [facility] [place] [address] after 2pm tomorrow to measure pt for DME. [address] [place] [address]  will not cover any DME costs while pt is receiving any level of skilled care. Approx cost for DME will be $45.00.  aware and states that they would like to pay out of pocket so that she has stocking while in SNF. Tricia vazquez. Will follow and assist with DC tomorrow as needed.      Chuck Gutierrez RN

## 2019-06-03 NOTE — PROGRESS NOTES
Physical Therapy  Facility/Department: [facility] - MED SURG/ORTHO  Daily Treatment Note  NAME: Joslyn Crook  : 1927  MRN: [id number]    Date of Service: 6/3/2019    Discharge Recommendations:  Subacute/Skilled Nursing Facility        Assessment   Body structures, Functions, Activity limitations: Decreased functional mobility ; Decreased endurance;Decreased strength;Decreased ROM  Assessment: Pt progressing well with ambulation, using RW and amb with min A of 1 X 25', 15'. Pt still in need of SNF for D/C to continue to increase independence and strength  Treatment Diagnosis: decreased gait and mobility  Specific instructions for Next Treatment: ex, mobility  Prognosis: Good  Patient Education: role of PT, safety  REQUIRES PT FOLLOW UP: Yes  Activity Tolerance  Activity Tolerance: Patient Tolerated treatment well     Patient Diagnosis(es): The primary encounter diagnosis was Leg swelling. Diagnoses of Cannot walk, Thyroid disease, Arthritis, and Elevated d-dimer were also pertinent to this visit. has a past medical history of Arthritis, Arthritis, CAD (coronary artery disease), GERD (gastroesophageal reflux disease), Hyperlipidemia, Hypertension, and Thyroid disease. has a past surgical history that includes joint replacement; Cholecystectomy; Coronary angioplasty with stent; and pr esophagogastroduodenoscopy transoral diagnostic (N/A, 2018). Restrictions  Restrictions/Precautions  Restrictions/Precautions: Fall Risk, General Precautions  Required Braces or Orthoses?: Yes  Required Braces or Orthoses  Right Lower Extremity Brace:  Ankle Foot Orthotics  Position Activity Restriction  Other position/activity restrictions: High fall risk; up with assist  Subjective   General  Chart Reviewed: Yes  Family / Caregiver Present: No  Referring Practitioner: Michael Medina CNP  Pain Screening  Patient Currently in Pain: Denies  Vital Signs  Patient Currently in Pain: Denies       Orientation  Orientation  Overall Orientation Status: Within Functional Limits  Cognition      Objective   Bed mobility  Supine to Sit: Unable to assess(already up)  Sit to Supine: Moderate assistance(BLE)  Transfers  Sit to Stand: Minimal Assistance  Stand to sit: Minimal Assistance  Ambulation  Ambulation?: Yes  Ambulation 1  Device: Rolling Walker  Assistance: Minimal assistance  Gait Deviations: Slow Rose;Decreased step length;Decreased step height  Distance: Pt amb w/ slow rose, decreased steadiness and stride, limited by fatigue  Comments: 25', 15'        Exercises  Gluteal Sets:  X 15 B X 5 sec each  Hip Flexion: X 15 B seated  Hip Abduction: x15 B seated clams  Knee Long Arc Quad: X 15 B  Ankle Pumps: x20 B          AM-PAC Score  AM-PAC Inpatient Mobility Raw Score : 16  AM-PAC Inpatient T-Scale Score : 40.78  Mobility Inpatient CMS 0-100% Score: 54.16  Mobility Inpatient CMS G-Code Modifier : CK          Goals  Short term goals  Time Frame for Short term goals: 1 week(6/4) unless otherwise specified  Short term goal 1: pt to be modified indep with bed mob  Short term goal 2: pt to be CG for transfers  Short term goal 3: pt to amb with crutches, AFO, orthopedic shoes and  ft  Short term goal 4: pt to participate in 10 reps LE ex by 6/1  Patient Goals   Patient goals : \"To walk\"    Plan    Plan  Times per week: 3-5 x wk  Specific instructions for Next Treatment: ex, mobility  Current Treatment Recommendations: Strengthening, Gait Training, Stair training, Balance Training, Functional Mobility Training, Transfer Training, Safety Education & Training  Safety Devices  Type of devices:  All fall risk precautions in place, Call light within reach, Gait belt, Bed alarm in place, Left in bed, Nurse notified     Therapy Time   Individual Concurrent Group Co-treatment   Time In 1110         Time Out 1140         Minutes [address] [place] [address] [address]

## 2019-06-03 NOTE — PROGRESS NOTES
Occupational Therapy  Facility/Department: [place] [address] - MED SURG/ORTHO  Daily Treatment Note  NAME: Cheryl Paredes  : 1927  MRN: [id number]    Date of Service: 6/3/2019    Discharge Recommendations:  Subacute/Skilled Nursing Facility  OT Equipment Recommendations  Equipment Needed: No  Other: defer to next level of care    Assessment   Performance deficits / Impairments: Decreased functional mobility ; Decreased ADL status; Decreased balance;Decreased safe awareness;Decreased endurance;Decreased strength  Assessment: Pt continues to be pleasant and agreeable to treatment, with improved level of assist for functional mobility/transfers with use of RW, however, pt continues to request use of crutches (despite increased level of assistance required). Pt also continues to require significant amount of assistance for LE ADL/toileting. Continue OT tx. Patient Education: safety  REQUIRES OT FOLLOW UP: Yes  Activity Tolerance  Activity Tolerance: Patient Tolerated treatment well;Patient limited by fatigue  Safety Devices  Safety Devices in place: Yes  Type of devices: Call light within reach; Chair alarm in place; Left in chair;Nurse notified;Gait belt         Patient Diagnosis(es): The primary encounter diagnosis was Leg swelling. Diagnoses of Cannot walk, Thyroid disease, Arthritis, and Elevated d-dimer were also pertinent to this visit. has a past medical history of Arthritis, Arthritis, CAD (coronary artery disease), GERD (gastroesophageal reflux disease), Hyperlipidemia, Hypertension, and Thyroid disease. has a past surgical history that includes joint replacement; Cholecystectomy; Coronary angioplasty with stent; and pr esophagogastroduodenoscopy transoral diagnostic (N/A, 2018). Restrictions  Restrictions/Precautions  Restrictions/Precautions: Fall Risk, General Precautions  Required Braces or Orthoses?: Yes  Required Braces or Orthoses  Right Lower Extremity Brace:  Ankle Foot Orthotics  Position Activity Restriction  Other position/activity restrictions: High fall risk; up with assist  Subjective   General  Chart Reviewed: Yes  Patient assessed for rehabilitation services?: Yes  Response to previous treatment: Patient with no complaints from previous session  Family / Caregiver Present: No  Referring Practitioner: RAMSES Person CNP  Diagnosis: gait disturbance, Left LE edema  Subjective  Subjective: Pt in bed on arrival, pleasant and agreeable to treatment   General Comment  Comments: Per RN okay to treat  Vital Signs  Patient Currently in Pain: Denies   Orientation  Orientation  Overall Orientation Status: Within Functional Limits  Objective    ADL  Grooming: Setup(seated to wash face, brush teeth)  UE Bathing: Setup;Supervision  UE Dressing: Setup; Moderate assistance(to doff gown, don shirt and jacket + assist to don bra)  LE Dressing: Maximum assistance(to change brief, don post-op shoe and AFO)  Toileting: Dependent/Total(assist for clothing management upon sitting/rising, assist for more thorough bladder/nithin hygiene and to apply cream)        Balance  Sitting Balance: Supervision  Standing Balance: Minimal assistance(CGA with moments of Min A for dynamic standing balance)  Standing Balance  Time: 2-3 minutes 2x, 1-2 minutes  Activity: mobility, standing ADL   Functional Mobility  Functional - Mobility Device: Rolling Walker  Activity: To/from bathroom  Assist Level: Minimal assistance(to CGA)  Toilet Transfers  Toilet - Technique: (RW)  Equipment Used: Standard toilet  Toilet Transfer: Minimal assistance  Bed mobility  Supine to Sit: Minimal assistance(HOB elevated, use of bedrail)  Sit to Supine: Unable to assess(pt left up in chair)  Transfers  Sit to stand: Minimal assistance  Stand to sit: Minimal assistance     Cognition  Overall Cognitive Status: [facility]           Plan   Plan  Times per week: 3-5x/ week   Current Treatment Recommendations: Balance Training, Functional Mobility Training,

## 2019-06-03 NOTE — FLOWSHEET NOTE
[facility] called and left message to call us back about pt.knee length zippered hose being ordered.

## 2019-06-03 NOTE — CARE COORDINATION
Call placed to [facility] to check status of precert. LVM with call back number. Awaiting return call. Hunter Mobley RN

## 2019-06-03 NOTE — CARE COORDINATION
CASE MANAGEMENT DISCHARGE SUMMARY      Discharge to: [facility] completed: Yes  Hospital Exemption Notification (HENS) completed: Yes    New Durable Medical Equipment ordered/agency:     Transportation: Private via       Notified: Pt and  aware    Family:    Facility/Agency: [facility] faxed   RN: Shahrzad Cunningham    Phone number for report to facility: [phone]    Note: Discharging nurse to complete [facility], reconcile [facility], and place final copy with patient's discharge packet.  RN to ensure that written prescriptions for  Level II medications are sent with patient to the facility as per protocol.      ,  Shandra Almanzar, RN

## 2019-06-03 NOTE — DISCHARGE SUMMARY
Hospital Medicine - progress note     Patient ID: Pilar Mcqueen      Patient's PCP: Jonny Larose MD    Admit Date: 5/27/2019     Discharge Date:   6/3/2019    Admitting Physician: Leila Diamond MD     Discharge Physician: Licha Farias MD     Discharge Diagnoses: Active Hospital Problems    Diagnosis    DVT of deep femoral vein, left (HCC) [I82.412]    Gait disturbance [R26.9]    Hypertension [I10]    Hyperlipidemia [E78.5]       The patient was seen and examined on day of discharge and this discharge summary is in conjunction with any daily progress note from day of discharge. Hospital Course:   Gait disturbance, possibly from DVT to left lower extremity  Echo in am done(ef 55%, no wm abn, mild lvh)  -vas surg consulted, no intervention at this time, no phlegmasia, ace wrap started, rec zip up compression stockings   Eliquis 10 twice a day for 7 days and then 5 twice a day     HTN  BPs have been controlled  Continue current anti hypertensive regimen, titrate dose as indicated  Reinforced and educated patient about weight loss/ lifestyle changes/ aerobic exercise/ low sodium and DASH diet      RA- continued home sulfasalazine and arava     Hypothyroid- stable, on synthroid            Physical Exam Performed:     /75   Pulse 60   Temp 98.4 °F (36.9 °C) (Oral)   Resp 16   Ht 5' 2\" (1.575 m)   Wt 160 lb (72.6 kg)   SpO2 92%   BMI 29.26 kg/m²       General appearance:  No apparent distress, appears stated age and cooperative. HEENT:  Normal cephalic, atraumatic without obvious deformity. Pupils equal, round, and reactive to light.  Extra ocular muscles intact. Conjunctivae/corneas clear. Neck: Supple, with full range of motion. No jugular venous distention. Trachea midline. Respiratory:  Normal respiratory effort. dim to auscultation, bilaterally without Rales/Wheezes/Rhonchi.   Cardiovascular:  Regular rate and rhythm with normal S1/S2 without murmurs, rubs or gallops. Abdomen: Soft, non-tender, non-distended with normal bowel sounds. Musculoskeletal:  No clubbing, cyanosis.  2+ left lower extremity edema(now with ace wrap). dec range of motion with deformity to right foot. Rheumatic joints noted  Skin: Skin color, texture, turgor normal.  No rashes or lesions. Neurologic:  Neurovascularly intact without any focal sensory/motor deficits. Cranial nerves: II-XII intact, grossly non-focal.  Psychiatric:  Alert and oriented, thought content appropriate, normal insight  Capillary Refill: Brisk,< 3 seconds   Peripheral Pulses: +2 palpable, equal bilaterally         Labs:  For convenience and continuity at follow-up the following most recent labs are provided:      CBC:    Lab Results   Component Value Date    WBC 5.8 05/28/2019    HGB 11.9 05/28/2019    HCT 36.1 05/28/2019     06/03/2019       Renal:    Lab Results   Component Value Date     05/27/2019    K 4.0 05/27/2019    K 3.7 11/07/2018     05/27/2019    CO2 28 05/27/2019    BUN 17 05/27/2019    CREATININE <0.5 05/27/2019    CALCIUM 9.1 05/27/2019         Significant Diagnostic Studies    Radiology:   VL Extremity Venous Left   Final Result             Consults:     IP CONSULT TO HOSPITALIST  IP CONSULT TO RESPIRATORY CARE  IP CONSULT TO VASCULAR SURGERY  IP CONSULT TO DIETITIAN    Disposition:  Skilled nursing facility    Condition at Discharge: Stable    Discharge Instructions/Follow-up:  PCP    Code Status:  Full Code     Activity: activity as tolerated    Diet: cardiac diet      Discharge Medications:     Current Discharge Medication List           Details   apixaban (ELIQUIS) 5 MG TABS tablet Take 2 tablets by mouth 2 times daily  Qty: 60 tablet              Details   pantoprazole (PROTONIX) 40 MG tablet Take 1 tablet by mouth 2 times daily (before meals)  Qty: 60 tablet, Refills: 3      sulfaSALAzine (AZULFIDINE) 500 MG tablet Take 500 mg by mouth 2 times daily      leflunomide (ARAVA) 20 MG tablet Take 20 mg by mouth daily      aspirin 81 MG tablet Take 81 mg by mouth daily      levothyroxine (SYNTHROID) 100 MCG tablet Take 100 mcg by mouth Daily      atenolol (TENORMIN) 25 MG tablet Take 25 mg by mouth daily             Time Spent on discharge is more than 30 minutes in the examination, evaluation, counseling and review of medications and discharge plan. Signed:    Segundo Silver MD   6/3/2019      Thank you Rodney Pettit MD for the opportunity to be involved in this patient's care. If you have any questions or concerns please feel free to contact me at [phone].

## 2019-06-04 VITALS
DIASTOLIC BLOOD PRESSURE: 68 MMHG | HEIGHT: 62 IN | RESPIRATION RATE: 16 BRPM | TEMPERATURE: 98.3 F | SYSTOLIC BLOOD PRESSURE: 133 MMHG | WEIGHT: 160 LBS | HEART RATE: 61 BPM | BODY MASS INDEX: 29.44 KG/M2 | OXYGEN SATURATION: 93 %

## 2019-06-04 PROCEDURE — 6370000000 HC RX 637 (ALT 250 FOR IP): Performed by: NURSE PRACTITIONER

## 2019-06-04 PROCEDURE — 6370000000 HC RX 637 (ALT 250 FOR IP): Performed by: INTERNAL MEDICINE

## 2019-06-04 RX ADMIN — LEFLUNOMIDE 20 MG: 10 TABLET ORAL at 09:00

## 2019-06-04 RX ADMIN — LOSARTAN POTASSIUM 50 MG: 25 TABLET, FILM COATED ORAL at 09:00

## 2019-06-04 RX ADMIN — PANTOPRAZOLE SODIUM 40 MG: 40 TABLET, DELAYED RELEASE ORAL at 09:00

## 2019-06-04 RX ADMIN — LEVOTHYROXINE SODIUM 100 MCG: 100 TABLET ORAL at 06:24

## 2019-06-04 RX ADMIN — APIXABAN 10 MG: 5 TABLET, FILM COATED ORAL at 08:59

## 2019-06-04 RX ADMIN — SULFASALAZINE 500 MG: 500 TABLET ORAL at 08:59

## 2019-06-04 RX ADMIN — ATENOLOL 25 MG: 25 TABLET ORAL at 09:00

## 2019-06-04 RX ADMIN — ASPIRIN 81 MG: 81 TABLET, COATED ORAL at 09:00

## 2019-06-04 ASSESSMENT — PAIN SCALES - GENERAL
PAINLEVEL_OUTOF10: 0
PAINLEVEL_OUTOF10: 0

## 2019-06-04 NOTE — DISCHARGE SUMMARY
Hospital Medicine - discharge summary     Patient ID: Rin Alu      Patient's PCP: Kandi Heimlich, MD    Admit Date: 5/27/2019     Discharge Date:   6/4/2019    Admitting Physician: Chandler Combs MD     Discharge Physician: Arsh Huertas MD     Discharge Diagnoses: Active Hospital Problems    Diagnosis    DVT of deep femoral vein, left (HCC) [I82.412]    Gait disturbance [R26.9]    Hypertension [I10]    Hyperlipidemia [E78.5]       The patient was seen and examined on day of discharge and this discharge summary is in conjunction with any daily progress note from day of discharge. Hospital Course:   Gait disturbance, possibly from DVT to left lower extremity  Echo in am done(ef 55%, no wm abn, mild lvh)  -vas surg consulted, no intervention at this time, no phlegmasia, ace wrap started, rec zip up compression stockings   Eliquis 10 twice a day for 7 days and then 5 twice a day     HTN  BPs have been controlled  Continue current anti hypertensive regimen, titrate dose as indicated  Reinforced and educated patient about weight loss/ lifestyle changes/ aerobic exercise/ low sodium and DASH diet      RA- continued home sulfasalazine and arava     Hypothyroid- stable, on synthroid            Physical Exam Performed:     /68   Pulse 61   Temp 98.3 °F (36.8 °C) (Oral)   Resp 16   Ht 5' 2\" (1.575 m)   Wt 160 lb (72.6 kg)   SpO2 93%   BMI 29.26 kg/m²       General appearance:  No apparent distress, appears stated age and cooperative. HEENT:  Normal cephalic, atraumatic without obvious deformity. Pupils equal, round, and reactive to light.  Extra ocular muscles intact. Conjunctivae/corneas clear. Neck: Supple, with full range of motion. No jugular venous distention. Trachea midline. Respiratory:  Normal respiratory effort. dim to auscultation, bilaterally without Rales/Wheezes/Rhonchi.   Cardiovascular:  Regular rate and rhythm with normal S1/S2 without murmurs, rubs or gallops. Abdomen: Soft, non-tender, non-distended with normal bowel sounds. Musculoskeletal:  No clubbing, cyanosis.  2+ left lower extremity edema(now with ace wrap). dec range of motion with deformity to right foot. Rheumatic joints noted  Skin: Skin color, texture, turgor normal.  No rashes or lesions. Neurologic:  Neurovascularly intact without any focal sensory/motor deficits. Cranial nerves: II-XII intact, grossly non-focal.  Psychiatric:  Alert and oriented, thought content appropriate, normal insight  Capillary Refill: Brisk,< 3 seconds   Peripheral Pulses: +2 palpable, equal bilaterally         Labs:  For convenience and continuity at follow-up the following most recent labs are provided:      CBC:    Lab Results   Component Value Date    WBC 5.8 05/28/2019    HGB 11.9 05/28/2019    HCT 36.1 05/28/2019     06/03/2019       Renal:    Lab Results   Component Value Date     05/27/2019    K 4.0 05/27/2019    K 3.7 11/07/2018     05/27/2019    CO2 28 05/27/2019    BUN 17 05/27/2019    CREATININE <0.5 05/27/2019    CALCIUM 9.1 05/27/2019         Significant Diagnostic Studies    Radiology:   VL Extremity Venous Left   Final Result             Consults:     IP CONSULT TO HOSPITALIST  IP CONSULT TO RESPIRATORY CARE  IP CONSULT TO VASCULAR SURGERY  IP CONSULT TO DIETITIAN    Disposition:  Skilled nursing facility    Condition at Discharge: Stable    Discharge Instructions/Follow-up:  PCP    Code Status:  Full Code     Activity: activity as tolerated    Diet: cardiac diet      Discharge Medications:     Current Discharge Medication List           Details   apixaban (ELIQUIS) 5 MG TABS tablet Take 2 tablets by mouth 2 times daily  Qty: 60 tablet              Details   pantoprazole (PROTONIX) 40 MG tablet Take 1 tablet by mouth 2 times daily (before meals)  Qty: 60 tablet, Refills: 3      sulfaSALAzine (AZULFIDINE) 500 MG tablet Take 500 mg by mouth 2 times daily      leflunomide (ARAVA) 20 MG

## 2019-06-25 ENCOUNTER — OFFICE VISIT (OUTPATIENT)
Dept: FAMILY MEDICINE CLINIC | Age: 84
End: 2019-06-25
Payer: MEDICARE

## 2019-06-25 VITALS — DIASTOLIC BLOOD PRESSURE: 50 MMHG | SYSTOLIC BLOOD PRESSURE: 122 MMHG | OXYGEN SATURATION: 93 % | HEART RATE: 64 BPM

## 2019-06-25 DIAGNOSIS — Z87.11 HISTORY OF GASTRIC ULCER: ICD-10-CM

## 2019-06-25 DIAGNOSIS — D63.8 ANEMIA OF CHRONIC DISEASE: ICD-10-CM

## 2019-06-25 DIAGNOSIS — E55.9 VITAMIN D DEFICIENCY: ICD-10-CM

## 2019-06-25 DIAGNOSIS — I25.83 CORONARY ARTERY DISEASE DUE TO LIPID RICH PLAQUE: ICD-10-CM

## 2019-06-25 DIAGNOSIS — M05.79 RHEUMATOID ARTHRITIS INVOLVING MULTIPLE SITES WITH POSITIVE RHEUMATOID FACTOR (HCC): ICD-10-CM

## 2019-06-25 DIAGNOSIS — I82.412 DVT OF DEEP FEMORAL VEIN, LEFT (HCC): ICD-10-CM

## 2019-06-25 DIAGNOSIS — I25.10 CORONARY ARTERY DISEASE DUE TO LIPID RICH PLAQUE: ICD-10-CM

## 2019-06-25 DIAGNOSIS — E88.09 HYPOALBUMINEMIA: ICD-10-CM

## 2019-06-25 DIAGNOSIS — I10 ESSENTIAL HYPERTENSION: Primary | ICD-10-CM

## 2019-06-25 DIAGNOSIS — M15.9 PRIMARY OSTEOARTHRITIS INVOLVING MULTIPLE JOINTS: ICD-10-CM

## 2019-06-25 DIAGNOSIS — F51.01 PRIMARY INSOMNIA: ICD-10-CM

## 2019-06-25 DIAGNOSIS — E03.9 ACQUIRED HYPOTHYROIDISM: ICD-10-CM

## 2019-06-25 DIAGNOSIS — K21.00 GASTROESOPHAGEAL REFLUX DISEASE WITH ESOPHAGITIS: ICD-10-CM

## 2019-06-25 PROBLEM — M15.0 PRIMARY OSTEOARTHRITIS INVOLVING MULTIPLE JOINTS: Status: ACTIVE | Noted: 2019-06-25

## 2019-06-25 PROCEDURE — 99215 OFFICE O/P EST HI 40 MIN: CPT | Performed by: FAMILY MEDICINE

## 2019-06-25 RX ORDER — ZOLPIDEM TARTRATE 10 MG/1
5 TABLET ORAL
COMMUNITY
Start: 2019-05-10 | End: 2022-07-26 | Stop reason: SDUPTHER

## 2019-06-25 RX ORDER — SUCRALFATE 1 G/1
1 TABLET ORAL NIGHTLY
COMMUNITY
Start: 2019-03-26 | End: 2022-11-01 | Stop reason: SDUPTHER

## 2019-06-25 RX ORDER — CHLORAL HYDRATE 500 MG
1000 CAPSULE ORAL 3 TIMES DAILY
COMMUNITY

## 2019-06-25 RX ORDER — SULFASALAZINE 500 MG/1
1000 TABLET, DELAYED RELEASE ORAL 2 TIMES DAILY
COMMUNITY
Start: 2019-05-08 | End: 2022-07-26

## 2019-06-25 RX ORDER — ATENOLOL 25 MG/1
25 TABLET ORAL 2 TIMES DAILY
COMMUNITY
Start: 2019-02-19 | End: 2021-06-21 | Stop reason: ALTCHOICE

## 2019-06-25 RX ORDER — LOSARTAN POTASSIUM 50 MG/1
1 TABLET ORAL 2 TIMES DAILY
COMMUNITY
Start: 2019-05-07 | End: 2020-06-16 | Stop reason: DRUGHIGH

## 2019-06-25 RX ORDER — M-VIT,TX,IRON,MINS/CALC/FOLIC 27MG-0.4MG
1 TABLET ORAL DAILY
COMMUNITY

## 2019-06-25 NOTE — PATIENT INSTRUCTIONS
Please call [phone] to schedule the venous doppler for the end of August/or first of September. Patient should call the office immediately with new or ongoing signs or symptoms or worsening, or proceed to the emergency room. If you are on medications which could impair your senses, you are at risk of weakness, falls, dizziness, or drowsiness. You should be careful during activities which could place you at risk of harm, such as climbing, using stairs, operating machinery, or driving vehicles. If you feel you cannot safely do these activities, you should request others to help you, or avoid the activities altogether. If you are drowsy for any other reason, you should use the same precautions as listed above.

## 2019-06-25 NOTE — PROGRESS NOTES
Component Value Date    ALT 15 05/27/2019    AST 25 05/27/2019        Hypothyroidism: Recent symptoms: none. She denies weight gain and weight loss. Patient is  taking her medication consistently on an empty stomach. No results found for: TSHREFLEX  No results found for: TSH      Patient's medications, allergies, past medical, surgical, social and family histories were reviewed and updated asappropriate on 6/25/2019 at 4:27 PM.    ROS:  Review of Systems    All other systems reviewed and are negative except as noted above on 6/25/2019 at 4:27 PM. Additional review of systems may be scanned into the media section ofthis medical record. Any responses requiring further intervention were pursued. Microscopic Examination (no units)   Date Value   05/28/2019 YES     Past Medical History:   Diagnosis Date    Arthritis     Arthritis     possibly Rheumatoid, Dr. Michela Rogers CAD (coronary artery disease)     GERD (gastroesophageal reflux disease)     Hyperlipidemia     Hypertension     Thyroid disease       No family history on file.   Social History     Socioeconomic History    Marital status:      Spouse name: Not on file    Number of children: Not on file    Years of education: Not on file    Highest education level: Not on file   Occupational History    Not on file   Social Needs    Financial resource strain: Not on file    Food insecurity:     Worry: Not on file     Inability: Not on file    Transportation needs:     Medical: Not on file     Non-medical: Not on file   Tobacco Use    Smoking status: Never Smoker    Smokeless tobacco: Never Used   Substance and Sexual Activity    Alcohol use: No    Drug use: No    Sexual activity: Not on file   Lifestyle    Physical activity:     Days per week: Not on file     Minutes per session: Not on file    Stress: Not on file   Relationships    Social connections:     Talks on phone: Not on file     Gets together: Not on file     Attends Taoism service: Not on file     Active member of club or organization: Not on file     Attends meetings of clubs or organizations: Not on file     Relationship status: Not on file    Intimate partner violence:     Fear of current or ex partner: Not on file     Emotionally abused: Not on file     Physically abused: Not on file     Forced sexual activity: Not on file   Other Topics Concern    Not on file   Social History Narrative    Not on file       Prior to Visit Medications    Medication Sig Taking? Authorizing Provider   sulfaSALAzine (AZULFIDINE) 500 MG EC tablet Take 1,000 mg by mouth 2 times daily Yes Historical Provider, MD   atenolol (TENORMIN) 25 MG tablet Take 25 mg by mouth 2 times daily Yes Historical Provider, MD   zolpidem (AMBIEN) 10 MG tablet Take 5 mg by mouth. Yes Historical Provider, MD   sucralfate (CARAFATE) 1 GM tablet 1 g nightly Yes Historical Provider, MD   losartan (COZAAR) 50 MG tablet 1 tablet 2 times daily Yes Historical Provider, MD   apixaban (ELIQUIS) 5 MG TABS tablet Take 2 tablets by mouth 2 times daily Yes Aurelio Shone, MD   pantoprazole (PROTONIX) 40 MG tablet Take 1 tablet by mouth 2 times daily (before meals)  Patient taking differently: Take 40 mg by mouth daily  Yes Balaji Alberts MD   leflunomide (ARAVA) 20 MG tablet Take 20 mg by mouth daily Yes Historical Provider, MD   aspirin 81 MG tablet Take 81 mg by mouth daily Yes Historical Provider, MD   levothyroxine (SYNTHROID) 100 MCG tablet Take 100 mcg by mouth Daily Yes Historical Provider, MD     Allergies   Allergen Reactions    Adhesive Tape      Blisters     Amlodipine Hives       OBJECTIVE:  Estimated body mass index is 29.26 kg/m² as calculated from the following:    Height as of 5/27/19: 5' 2\" (1.575 m). Weight as of 5/27/19: 160 lb (72.6 kg).   Vitals:    06/25/19 1625   BP: (!) 122/50   Site: Left Upper Arm   Position: Sitting   Cuff Size: Large Adult   Pulse: 64   SpO2: 93%     BP Readings from Last 2 Encounters: 06/25/19 (!) 122/50   06/04/19 133/68     Wt Readings from Last 3 Encounters:   05/27/19 160 lb (72.6 kg)   11/08/18 161 lb 12.8 oz (73.4 kg)   10/16/18 164 lb 0.4 oz (74.4 kg)       Physical Exam   Constitutional: She appears well-developed and well-nourished. No distress. HENT:   Head: Normocephalic and atraumatic. Right Ear: External ear normal.   Left Ear: External ear normal.   Nose: Nose normal.   Lips symmetric   Eyes: Pupils are equal, round, and reactive to light. Conjunctivae and lids are normal. Right eye exhibits no discharge. Left eye exhibits no discharge. No scleral icterus. Neck: Normal range of motion. Neck supple. No JVD present. No tracheal deviation present. No thyromegaly present. Cardiovascular: Normal rate, regular rhythm, normal heart sounds and intact distal pulses. Exam reveals no gallop and no friction rub. No murmur heard. Pulmonary/Chest: Effort normal. No stridor. No respiratory distress. She has no wheezes. She has rales (left base which cleared with deep breaths). She exhibits no tenderness. Abdominal: Soft. Bowel sounds are normal. She exhibits no distension and no mass. There is no hepatosplenomegaly. There is no tenderness. There is no guarding. Musculoskeletal: Normal range of motion. She exhibits edema (4+ left LE and 3+ right LE). She exhibits no tenderness. Legs:       Feet:    Hand joint deformities. Diminished shoulder range of motion. No evidence of active synovitis   Lymphadenopathy:        Head (right side): No submental, no submandibular, no tonsillar, no preauricular, no posterior auricular and no occipital adenopathy present. Head (left side): No submental, no submandibular, no tonsillar, no preauricular, no posterior auricular and no occipital adenopathy present. She has no cervical adenopathy. Right: No supraclavicular and no epitrochlear adenopathy present.         Left: No supraclavicular and no epitrochlear adenopathy RN on 6/25/2019 at 4:27 PM      Provider attestation:     I, Dr. Tiffanie Silva, personally performed the services described in this documentation, as scribed by the above signed scribe in my presence, and it is both accurate and complete. I agree with the ROS and Past Histories independently gathered by the clinical support staff and the remaining scribed note accurately describes my personal service to the patient.       6/25/2019    5:25 PM

## 2019-06-26 ENCOUNTER — TELEPHONE (OUTPATIENT)
Dept: FAMILY MEDICINE CLINIC | Age: 84
End: 2019-06-26

## 2019-06-26 NOTE — TELEPHONE ENCOUNTER
Spoke to Susan Monroe and asked her to add OT and to please have the nurse draw a TSH, free T4, CBC,CMP,Vit D.

## 2019-06-28 ENCOUNTER — HOSPITAL ENCOUNTER (OUTPATIENT)
Age: 84
Setting detail: SPECIMEN
Discharge: HOME | End: 2019-06-28
Payer: MEDICARE

## 2019-06-28 LAB
A/G RATIO: 1.5 (ref 1.1–2.2)
ALBUMIN SERPL-MCNC: 4 G/DL (ref 3.4–5)
ALP BLD-CCNC: 102 U/L (ref 40–129)
ALT SERPL-CCNC: 14 U/L (ref 10–40)
ANION GAP SERPL CALCULATED.3IONS-SCNC: 12 MMOL/L (ref 3–16)
AST SERPL-CCNC: 24 U/L (ref 15–37)
BASOPHILS ABSOLUTE: 0.1 K/UL (ref 0–0.2)
BASOPHILS RELATIVE PERCENT: 1.4 %
BILIRUB SERPL-MCNC: 0.5 MG/DL (ref 0–1)
BUN BLDV-MCNC: 17 MG/DL (ref 7–20)
CALCIUM SERPL-MCNC: 10 MG/DL (ref 8.3–10.6)
CHLORIDE BLD-SCNC: 103 MMOL/L (ref 99–110)
CO2: 27 MMOL/L (ref 21–32)
CREAT SERPL-MCNC: <0.5 MG/DL (ref 0.6–1.2)
EOSINOPHILS ABSOLUTE: 0.6 K/UL (ref 0–0.6)
EOSINOPHILS RELATIVE PERCENT: 11.5 %
GFR AFRICAN AMERICAN: >60
GFR NON-AFRICAN AMERICAN: >60
GLOBULIN: 2.7 G/DL
GLUCOSE BLD-MCNC: 101 MG/DL (ref 70–99)
HCT VFR BLD CALC: 37.8 % (ref 36–48)
HEMOGLOBIN: 12.3 G/DL (ref 12–16)
LYMPHOCYTES ABSOLUTE: 0.8 K/UL (ref 1–5.1)
LYMPHOCYTES RELATIVE PERCENT: 16.7 %
MCH RBC QN AUTO: 34.6 PG (ref 26–34)
MCHC RBC AUTO-ENTMCNC: 32.6 G/DL (ref 31–36)
MCV RBC AUTO: 106 FL (ref 80–100)
MONOCYTES ABSOLUTE: 0.6 K/UL (ref 0–1.3)
MONOCYTES RELATIVE PERCENT: 12.5 %
NEUTROPHILS ABSOLUTE: 2.9 K/UL (ref 1.7–7.7)
NEUTROPHILS RELATIVE PERCENT: 57.9 %
PDW BLD-RTO: 14.1 % (ref 12.4–15.4)
PLATELET # BLD: 196 K/UL (ref 135–450)
PMV BLD AUTO: 7.9 FL (ref 5–10.5)
POTASSIUM SERPL-SCNC: 4 MMOL/L (ref 3.5–5.1)
RBC # BLD: 3.57 M/UL (ref 4–5.2)
SODIUM BLD-SCNC: 142 MMOL/L (ref 136–145)
T4 FREE: 1.2 NG/DL (ref 0.9–1.8)
TOTAL PROTEIN: 6.7 G/DL (ref 6.4–8.2)
TSH SERPL DL<=0.05 MIU/L-ACNC: 6.86 UIU/ML (ref 0.27–4.2)
VITAMIN D 25-HYDROXY: 83.1 NG/ML
WBC # BLD: 4.9 K/UL (ref 4–11)

## 2019-06-28 PROCEDURE — 85025 COMPLETE CBC W/AUTO DIFF WBC: CPT

## 2019-06-28 PROCEDURE — 84443 ASSAY THYROID STIM HORMONE: CPT

## 2019-06-28 PROCEDURE — 84481 FREE ASSAY (FT-3): CPT

## 2019-06-28 PROCEDURE — 80053 COMPREHEN METABOLIC PANEL: CPT

## 2019-06-28 PROCEDURE — 82306 VITAMIN D 25 HYDROXY: CPT

## 2019-06-28 PROCEDURE — 36415 COLL VENOUS BLD VENIPUNCTURE: CPT

## 2019-06-28 PROCEDURE — 84439 ASSAY OF FREE THYROXINE: CPT

## 2019-06-28 RX ORDER — APIXABAN 5 MG/1
TABLET, FILM COATED ORAL
Qty: 30 TABLET | Refills: 5 | Status: SHIPPED | OUTPATIENT
Start: 2019-06-28 | End: 2019-09-18 | Stop reason: ALTCHOICE

## 2019-07-01 DIAGNOSIS — R79.89 ELEVATED TSH: Primary | ICD-10-CM

## 2019-07-01 DIAGNOSIS — D63.8 ANEMIA OF CHRONIC DISEASE: Primary | ICD-10-CM

## 2019-07-02 DIAGNOSIS — R79.89 ELEVATED TSH: ICD-10-CM

## 2019-07-02 DIAGNOSIS — E03.9 ACQUIRED HYPOTHYROIDISM: Primary | ICD-10-CM

## 2019-07-02 LAB — T3 FREE: 2.1 PG/ML (ref 2.3–4.2)

## 2019-08-06 DIAGNOSIS — I82.412 DVT OF DEEP FEMORAL VEIN, LEFT (HCC): Primary | ICD-10-CM

## 2019-09-03 ENCOUNTER — HOSPITAL ENCOUNTER (OUTPATIENT)
Dept: VASCULAR LAB | Age: 84
Discharge: HOME | End: 2019-09-03
Payer: MEDICARE

## 2019-09-03 DIAGNOSIS — I82.412 DVT OF DEEP FEMORAL VEIN, LEFT (HCC): ICD-10-CM

## 2019-09-03 PROCEDURE — 93971 EXTREMITY STUDY: CPT

## 2019-09-05 ENCOUNTER — TELEPHONE (OUTPATIENT)
Dept: FAMILY MEDICINE CLINIC | Age: 84
End: 2019-09-05

## 2019-09-17 ENCOUNTER — OFFICE VISIT (OUTPATIENT)
Dept: FAMILY MEDICINE CLINIC | Age: 84
End: 2019-09-17
Payer: MEDICARE

## 2019-09-17 VITALS — HEART RATE: 63 BPM | SYSTOLIC BLOOD PRESSURE: 132 MMHG | DIASTOLIC BLOOD PRESSURE: 64 MMHG | OXYGEN SATURATION: 92 %

## 2019-09-17 DIAGNOSIS — I25.83 CORONARY ARTERY DISEASE DUE TO LIPID RICH PLAQUE: ICD-10-CM

## 2019-09-17 DIAGNOSIS — E55.9 VITAMIN D DEFICIENCY: ICD-10-CM

## 2019-09-17 DIAGNOSIS — I82.412 DVT OF DEEP FEMORAL VEIN, LEFT (HCC): Primary | ICD-10-CM

## 2019-09-17 DIAGNOSIS — I25.10 CORONARY ARTERY DISEASE DUE TO LIPID RICH PLAQUE: ICD-10-CM

## 2019-09-17 DIAGNOSIS — Z23 FLU VACCINE NEED: ICD-10-CM

## 2019-09-17 DIAGNOSIS — E03.9 ACQUIRED HYPOTHYROIDISM: ICD-10-CM

## 2019-09-17 DIAGNOSIS — D63.8 ANEMIA OF CHRONIC DISEASE: ICD-10-CM

## 2019-09-17 DIAGNOSIS — E78.2 MIXED HYPERLIPIDEMIA: ICD-10-CM

## 2019-09-17 DIAGNOSIS — I10 ESSENTIAL HYPERTENSION: ICD-10-CM

## 2019-09-17 DIAGNOSIS — Z87.11 HISTORY OF GASTRIC ULCER: ICD-10-CM

## 2019-09-17 DIAGNOSIS — K21.00 GASTROESOPHAGEAL REFLUX DISEASE WITH ESOPHAGITIS: ICD-10-CM

## 2019-09-17 DIAGNOSIS — M05.79 RHEUMATOID ARTHRITIS INVOLVING MULTIPLE SITES WITH POSITIVE RHEUMATOID FACTOR (HCC): ICD-10-CM

## 2019-09-17 DIAGNOSIS — N32.81 OVERACTIVE BLADDER: ICD-10-CM

## 2019-09-17 DIAGNOSIS — M15.9 PRIMARY OSTEOARTHRITIS INVOLVING MULTIPLE JOINTS: ICD-10-CM

## 2019-09-17 LAB
T4 FREE: 1.2 NG/DL (ref 0.9–1.8)
TSH SERPL DL<=0.05 MIU/L-ACNC: 5.6 UIU/ML (ref 0.27–4.2)

## 2019-09-17 PROCEDURE — G0008 ADMIN INFLUENZA VIRUS VAC: HCPCS | Performed by: FAMILY MEDICINE

## 2019-09-17 PROCEDURE — 90686 IIV4 VACC NO PRSV 0.5 ML IM: CPT | Performed by: FAMILY MEDICINE

## 2019-09-17 PROCEDURE — 36415 COLL VENOUS BLD VENIPUNCTURE: CPT | Performed by: FAMILY MEDICINE

## 2019-09-17 PROCEDURE — 99214 OFFICE O/P EST MOD 30 MIN: CPT | Performed by: FAMILY MEDICINE

## 2019-09-17 RX ORDER — OXYBUTYNIN CHLORIDE 10 MG/1
10 TABLET, EXTENDED RELEASE ORAL DAILY
Qty: 90 TABLET | Refills: 3 | Status: SHIPPED | OUTPATIENT
Start: 2019-09-17 | End: 2020-09-04

## 2019-09-17 NOTE — PROGRESS NOTES
media section ofthis medical record. Any responses requiring further intervention were pursued. Microscopic Examination (no units)   Date Value   05/28/2019 YES     Past Medical History:   Diagnosis Date    Arthritis     Arthritis     possibly Rheumatoid, Dr. Grant Jacques CAD (coronary artery disease)     GERD (gastroesophageal reflux disease)     Hyperlipidemia     Hypertension     Thyroid disease       No family history on file. Social History     Socioeconomic History    Marital status:      Spouse name: Not on file    Number of children: Not on file    Years of education: Not on file    Highest education level: Not on file   Occupational History    Not on file   Social Needs    Financial resource strain: Not on file    Food insecurity:     Worry: Not on file     Inability: Not on file    Transportation needs:     Medical: Not on file     Non-medical: Not on file   Tobacco Use    Smoking status: Never Smoker    Smokeless tobacco: Never Used   Substance and Sexual Activity    Alcohol use: No    Drug use: No    Sexual activity: Not on file   Lifestyle    Physical activity:     Days per week: Not on file     Minutes per session: Not on file    Stress: Not on file   Relationships    Social connections:     Talks on phone: Not on file     Gets together: Not on file     Attends Jewish service: Not on file     Active member of club or organization: Not on file     Attends meetings of clubs or organizations: Not on file     Relationship status: Not on file    Intimate partner violence:     Fear of current or ex partner: Not on file     Emotionally abused: Not on file     Physically abused: Not on file     Forced sexual activity: Not on file   Other Topics Concern    Not on file   Social History Narrative    Not on file       Prior to Visit Medications    Medication Sig Taking?  Authorizing Provider   ELIQUIS 5 MG TABS tablet TAKE 2 TABLETS BY MOUTH EVERY 12 HOURS Yes Ty Atkinson Normocephalic and atraumatic. Right Ear: External ear normal.   Left Ear: External ear normal.   Nose: Nose normal.   Lips symmetric   Eyes: Pupils are equal, round, and reactive to light. Lids are normal. Right eye exhibits no discharge. Left eye exhibits no discharge. No scleral icterus. Neck: No JVD present. No thyromegaly present. Cardiovascular: Normal rate, regular rhythm and normal heart sounds. Pulmonary/Chest: Effort normal and breath sounds normal. No respiratory distress. Abdominal: Soft. There is no hepatosplenomegaly. There is no tenderness. Musculoskeletal: She exhibits no edema. Legs:  Skin: Skin is warm and dry. No rash noted. She is not diaphoretic. No erythema. No pallor. Turgor normal   Psychiatric: She has a normal mood and affect. Her behavior is normal. Judgment and thought content normal.   Nursing note and vitals reviewed. Follow-up venous Doppler clear of clot       ASSESSMENT PLAN      Diagnosis Orders   1. DVT of deep femoral vein, left ([facility] [address]     2. Acquired hypothyroidism  TSH without Reflex    T4, FREE   3. Anemia of chronic disease  CBC WITH AUTO DIFFERENTIAL   4. Flu vaccine need  INFLUENZA, QUADV, 3 YRS AND OLDER, IM PF, PREFILL SYR OR SDV, 0.5ML (AFLURIA QUADV, PF)   5. Gastroesophageal reflux disease with esophagitis     6. Primary osteoarthritis involving multiple joints     7. Rheumatoid arthritis involving multiple sites with positive rheumatoid factor ([facility] [address]     8. Essential hypertension     9. Mixed hyperlipidemia     10. Coronary artery disease due to lipid rich plaque     11. History of gastric ulcer     12. Vitamin D deficiency     13. Overactive bladder  oxybutynin (DITROPAN XL) 10 MG extended release tablet   At this point we have stopped her anticoagulant. She had a history of gastric ulcers she had blood in the past.  First clot. Provoked by prolonged auto travel.   I think at this point with the venous Doppler follow-up clear benefit of

## 2019-09-18 ENCOUNTER — TELEPHONE (OUTPATIENT)
Dept: FAMILY MEDICINE CLINIC | Age: 84
End: 2019-09-18

## 2019-09-18 DIAGNOSIS — E03.9 ACQUIRED HYPOTHYROIDISM: Primary | ICD-10-CM

## 2019-09-18 DIAGNOSIS — E03.9 ACQUIRED HYPOTHYROIDISM: ICD-10-CM

## 2019-09-18 DIAGNOSIS — D63.8 ANEMIA OF CHRONIC DISEASE: ICD-10-CM

## 2019-09-18 DIAGNOSIS — D63.8 ANEMIA OF CHRONIC DISEASE: Primary | ICD-10-CM

## 2019-09-18 LAB
BASOPHILS ABSOLUTE: 0.1 K/UL (ref 0–0.2)
BASOPHILS RELATIVE PERCENT: 1.6 %
EOSINOPHILS ABSOLUTE: 0.3 K/UL (ref 0–0.6)
EOSINOPHILS RELATIVE PERCENT: 8 %
FERRITIN: 328.8 NG/ML (ref 15–150)
FOLATE: >20 NG/ML (ref 4.78–24.2)
HCT VFR BLD CALC: 35.9 % (ref 36–48)
HCT VFR BLD CALC: 36.9 % (ref 36–48)
HEMATOLOGY PATH CONSULT: NORMAL
HEMATOLOGY PATH CONSULT: YES
HEMOGLOBIN: 11.6 G/DL (ref 12–16)
IMMATURE RETIC FRACT: 0.51 (ref 0.21–0.37)
IRON SATURATION: 92 % (ref 15–50)
IRON: 184 UG/DL (ref 37–145)
LYMPHOCYTES ABSOLUTE: 1 K/UL (ref 1–5.1)
LYMPHOCYTES RELATIVE PERCENT: 23.5 %
MACROCYTES: ABNORMAL
MCH RBC QN AUTO: 35.8 PG (ref 26–34)
MCHC RBC AUTO-ENTMCNC: 32.3 G/DL (ref 31–36)
MCV RBC AUTO: 110.7 FL (ref 80–100)
MONOCYTES ABSOLUTE: 0.6 K/UL (ref 0–1.3)
MONOCYTES RELATIVE PERCENT: 13.8 %
NEUTROPHILS ABSOLUTE: 2.3 K/UL (ref 1.7–7.7)
NEUTROPHILS RELATIVE PERCENT: 53.1 %
PDW BLD-RTO: 14.4 % (ref 12.4–15.4)
PLATELET # BLD: 198 K/UL (ref 135–450)
PMV BLD AUTO: 8.4 FL (ref 5–10.5)
RBC # BLD: 3.24 M/UL (ref 4–5.2)
RETICULOCYTE ABSOLUTE COUNT: 0.1 M/UL (ref 0.02–0.1)
RETICULOCYTE COUNT PCT: 2.92 % (ref 0.5–2.18)
SLIDE REVIEW: ABNORMAL
T3 FREE: 1.8 PG/ML (ref 2.3–4.2)
TOTAL IRON BINDING CAPACITY: 201 UG/DL (ref 260–445)
VITAMIN B-12: 631 PG/ML (ref 211–911)
WBC # BLD: 4.3 K/UL (ref 4–11)

## 2019-09-18 RX ORDER — LEVOTHYROXINE SODIUM 0.03 MG/1
TABLET ORAL
Qty: 30 TABLET | Refills: 1 | Status: SHIPPED | OUTPATIENT
Start: 2019-09-18 | End: 2019-11-07 | Stop reason: SDUPTHER

## 2019-09-18 RX ORDER — LEVOTHYROXINE SODIUM 0.1 MG/1
TABLET ORAL
Qty: 30 TABLET | Refills: 1 | Status: SHIPPED | OUTPATIENT
Start: 2019-09-18 | End: 2020-06-17 | Stop reason: DRUGHIGH

## 2019-09-18 NOTE — TELEPHONE ENCOUNTER
I am assuming because it was normal in June but will send to Dr Carleen Phipps to see if this was needed and why.

## 2019-09-18 NOTE — TELEPHONE ENCOUNTER
Patients  request that we RX the 100 mcg and 25 mcg tabs separately, sent these to [facility] and placed orders for repeat thyroid tests in 6 weeks. CBC order for 3 months. Will fax all labs to Dr Esther Conde.

## 2019-09-20 DIAGNOSIS — M05.79 RHEUMATOID ARTHRITIS INVOLVING MULTIPLE SITES WITH POSITIVE RHEUMATOID FACTOR (HCC): ICD-10-CM

## 2019-09-20 DIAGNOSIS — E03.9 ACQUIRED HYPOTHYROIDISM: Primary | ICD-10-CM

## 2019-09-20 DIAGNOSIS — Z79.899 LONG-TERM USE OF HIGH-RISK MEDICATION: ICD-10-CM

## 2019-09-20 LAB
ALBUMIN SERPL-MCNC: 4.4 G/DL (ref 3.4–5)
ALP BLD-CCNC: 99 U/L (ref 40–129)
ALT SERPL-CCNC: 14 U/L (ref 10–40)
AST SERPL-CCNC: 24 U/L (ref 15–37)
BILIRUB SERPL-MCNC: 0.4 MG/DL (ref 0–1)
BILIRUBIN DIRECT: <0.2 MG/DL (ref 0–0.3)
BILIRUBIN, INDIRECT: NORMAL MG/DL (ref 0–1)
TOTAL PROTEIN: 6.5 G/DL (ref 6.4–8.2)

## 2019-10-03 ENCOUNTER — TELEPHONE (OUTPATIENT)
Dept: FAMILY MEDICINE CLINIC | Age: 84
End: 2019-10-03

## 2019-10-17 ENCOUNTER — TELEPHONE (OUTPATIENT)
Dept: FAMILY MEDICINE CLINIC | Age: 84
End: 2019-10-17

## 2019-10-17 DIAGNOSIS — I82.412 DVT OF DEEP FEMORAL VEIN, LEFT (HCC): Primary | ICD-10-CM

## 2019-10-18 ENCOUNTER — TELEPHONE (OUTPATIENT)
Dept: FAMILY MEDICINE CLINIC | Age: 84
End: 2019-10-18

## 2019-11-06 ENCOUNTER — HOSPITAL ENCOUNTER (OUTPATIENT)
Dept: VASCULAR LAB | Age: 84
Discharge: HOME | End: 2019-11-06
Payer: MEDICARE

## 2019-11-06 DIAGNOSIS — I82.412 DVT OF DEEP FEMORAL VEIN, LEFT (HCC): ICD-10-CM

## 2019-11-06 PROCEDURE — 93971 EXTREMITY STUDY: CPT

## 2019-11-07 DIAGNOSIS — E03.9 ACQUIRED HYPOTHYROIDISM: ICD-10-CM

## 2019-11-08 RX ORDER — LEVOTHYROXINE SODIUM 0.03 MG/1
TABLET ORAL
Qty: 30 TABLET | Refills: 1 | Status: SHIPPED | OUTPATIENT
Start: 2019-11-08 | End: 2019-11-30 | Stop reason: SDUPTHER

## 2019-11-30 DIAGNOSIS — E03.9 ACQUIRED HYPOTHYROIDISM: ICD-10-CM

## 2019-12-02 RX ORDER — LEVOTHYROXINE SODIUM 0.03 MG/1
TABLET ORAL
Qty: 30 TABLET | Refills: 2 | Status: SHIPPED | OUTPATIENT
Start: 2019-12-02 | End: 2019-12-30

## 2019-12-28 DIAGNOSIS — E03.9 ACQUIRED HYPOTHYROIDISM: ICD-10-CM

## 2019-12-30 RX ORDER — LEVOTHYROXINE SODIUM 0.03 MG/1
TABLET ORAL
Qty: 30 TABLET | Refills: 2 | Status: SHIPPED | OUTPATIENT
Start: 2019-12-30 | End: 2020-03-26

## 2020-01-06 ENCOUNTER — TELEPHONE (OUTPATIENT)
Dept: FAMILY MEDICINE CLINIC | Age: 85
End: 2020-01-06

## 2020-01-06 NOTE — TELEPHONE ENCOUNTER
Patient has a surgery scheduled for 1/16. Was trying to be seen before surgery for pre-op.  Please advise

## 2020-01-13 ENCOUNTER — OFFICE VISIT (OUTPATIENT)
Dept: FAMILY MEDICINE CLINIC | Age: 85
End: 2020-01-13
Payer: COMMERCIAL

## 2020-01-13 VITALS
BODY MASS INDEX: 30.62 KG/M2 | OXYGEN SATURATION: 95 % | WEIGHT: 167.4 LBS | SYSTOLIC BLOOD PRESSURE: 138 MMHG | DIASTOLIC BLOOD PRESSURE: 60 MMHG | HEART RATE: 62 BPM | TEMPERATURE: 98.4 F

## 2020-01-13 PROCEDURE — 99215 OFFICE O/P EST HI 40 MIN: CPT | Performed by: FAMILY MEDICINE

## 2020-01-13 NOTE — PROGRESS NOTES
Ayden Augustin M.D., [address]. [place], [address]  Phone: [phone]  Fax: [phone]  Preoperative Consultation      Theresa Valentine  YOB: 1927    Date of Service:  1/13/2020    Vitals:    01/13/20 1507   BP: 138/60   Site: Left Upper Arm   Position: Sitting   Cuff Size: Medium Adult   Pulse: 62   Temp: 98.4 °F (36.9 °C)   SpO2: 95%   Weight: 167 lb 6.4 oz (75.9 kg)      Wt Readings from Last 2 Encounters:   01/13/20 167 lb 6.4 oz (75.9 kg)   05/27/19 160 lb (72.6 kg)     BP Readings from Last 3 Encounters:   01/13/20 138/60   09/17/19 132/64   06/25/19 (!) 122/50        Chief Complaint   Patient presents with   Philippe Pre-op Exam     Allergies   Allergen Reactions    Adhesive Tape      Blisters     Amlodipine Hives     Outpatient Medications Marked as Taking for the 1/13/20 encounter (Office Visit) with Lobito Potter MD   Medication Sig Dispense Refill    levothyroxine (SYNTHROID) 25 MCG tablet TAKE 1 TABLET BY MOUTH EVERY DAY WITH 100 MCG TABLETS 30 tablet 2    apixaban (ELIQUIS) 5 MG TABS tablet 2 po q12h x 7 days, then 1 po q12h.  60 tablet 5    levothyroxine (SYNTHROID) 100 MCG tablet 1 daily with the 25 mcg tab 30 tablet 1    oxybutynin (DITROPAN XL) 10 MG extended release tablet Take 1 tablet by mouth daily 90 tablet 3    sulfaSALAzine (AZULFIDINE) 500 MG EC tablet Take 1,000 mg by mouth 2 times daily      atenolol (TENORMIN) 25 MG tablet Take 25 mg by mouth 2 times daily      sucralfate (CARAFATE) 1 GM tablet 1 g nightly      losartan (COZAAR) 50 MG tablet 1 tablet 2 times daily Indications: 1 in morning, 1/2 in evening       vitamin D (CHOLECALCIFEROL) 1000 UNIT TABS tablet Take 4,000 Units by mouth daily      Multiple Vitamins-Minerals (THERAPEUTIC MULTIVITAMIN-MINERALS) tablet Take 1 tablet by mouth daily      Omega-3 Fatty Acids (FISH OIL) 1000 MG CAPS Take 1,000 mg by mouth 3 times daily      pantoprazole (PROTONIX) 40 MG tablet Take 1 tablet by mouth 2 times daily (before meals) (Patient taking differently: Take 40 mg by mouth daily ) 60 tablet 3    leflunomide (ARAVA) 20 MG tablet Take 20 mg by mouth daily      aspirin 81 MG tablet Take 81 mg by mouth daily         This patient presents to the office today for a preoperative consultation at the request of surgeon, Dr. Bekah Patel, who plans on performing right cataract surgery on January 16 at [facility]. The current problem began 2 years ago, and symptoms have been worsening with time. Conservative therapy: N/A. Planned anesthesia: Topical  Known anesthesia problems: None   Bleeding risk: history of ulcer and is on Eliquis  Personal or FH of DVT/PE: Yes - DVT leg x 2    Patient objection to receiving blood products: No    Patient Active Problem List   Diagnosis    GI bleed    Acquired hypothyroidism    Hypertension    Mixed hyperlipidemia    Gastroesophageal reflux disease with esophagitis    CAD (coronary artery disease)    Gait disturbance    DVT of deep femoral vein, left (HCC)    Primary osteoarthritis involving multiple joints    Rheumatoid arthritis involving multiple sites with positive rheumatoid factor (HCC)    History of gastric ulcer    Hypoalbuminemia    Anemia of chronic disease    Vitamin D deficiency       Past Medical History:   Diagnosis Date    Arthritis     Arthritis     possibly Rheumatoid, Dr. Sofy Bender CAD (coronary artery disease)     GERD (gastroesophageal reflux disease)     Hyperlipidemia     Hypertension     Thyroid disease      Past Surgical History:   Procedure Laterality Date    CHOLECYSTECTOMY      CORONARY ANGIOPLASTY WITH STENT PLACEMENT      JOINT REPLACEMENT      \"knees and hips\"    RI ESOPHAGOGASTRODUODENOSCOPY TRANSORAL DIAGNOSTIC N/A 11/7/2018    EGD ESOPHAGOGASTRODUODENOSCOPY performed by Effie Domingo MD at [address] [place] [address]     No family history on file.   Social History Socioeconomic History    Marital status:      Spouse name: Not on file    Number of children: Not on file    Years of education: Not on file    Highest education level: Not on file   Occupational History    Not on file   Social Needs    Financial resource strain: Not on file    Food insecurity:     Worry: Not on file     Inability: Not on file    Transportation needs:     Medical: Not on file     Non-medical: Not on file   Tobacco Use    Smoking status: Never Smoker    Smokeless tobacco: Never Used   Substance and Sexual Activity    Alcohol use: No    Drug use: No    Sexual activity: Not on file   Lifestyle    Physical activity:     Days per week: Not on file     Minutes per session: Not on file    Stress: Not on file   Relationships    Social connections:     Talks on phone: Not on file     Gets together: Not on file     Attends Nondenominational service: Not on file     Active member of club or organization: Not on file     Attends meetings of clubs or organizations: Not on file     Relationship status: Not on file    Intimate partner violence:     Fear of current or ex partner: Not on file     Emotionally abused: Not on file     Physically abused: Not on file     Forced sexual activity: Not on file   Other Topics Concern    Not on file   Social History Narrative    Not on file       Review of Systems  A comprehensive review of systems was negative except for what was noted in the HPI. Additional review of systems may be scanned into the media section of this medical record. Any responses requiring further intervention were pursued. Physical Exam   Constitutional: She is oriented to person, place, and time. She appears well-developed and well-nourished. No distress. HENT:   Head: Normocephalic and atraumatic.    Mouth/Throat: Uvula is midline, oropharynx is clear and moist and mucous membranes are normal.   Eyes: Conjunctivae and EOM are normal. Pupils are equal, round, and reactive to light.   Neck: Trachea normal and normal range of motion. Neck supple. No JVD present. Carotid bruit is not present. No mass and no thyromegaly present. Cardiovascular: Normal rate, regular rhythm, normal heart sounds and intact distal pulses. Exam reveals no gallop and no friction rub. No murmur heard. Pulmonary/Chest: Effort normal and breath sounds normal. No respiratory distress. She has no wheezes. She has no rales. Abdominal: Soft. Normal aorta and bowel sounds are normal. She exhibits no distension and no mass. There is no hepatosplenomegaly. No tenderness. Musculoskeletal: She exhibits no edema and no tenderness. Kyphosis and LE weakness  Neurological: She is alert and oriented to person, place, and time. She has normal strength. No cranial nerve deficit or sensory deficit. Coordination and gait normal.   Skin: Skin is warm and dry. No rash noted. No erythema. Dressings on RLE. Compression stocking LLE. Psychiatric: She has a normal mood and affect. Her behavior is normal.   Lymph: neg anterior/cervical, occipital, supraclavicular nodes, epitrochlear, inguinal  Normal range of motion elbows, knees, ankles      EKG Interpretation:  None needed. Lab Review none needed     Assessment:       80 y.o. patient with planned surgery as above. Known risk factors for perioperative complications: Hypertension, Obstructive sleep apnea     RCRI (Revised Cardiac Risk Index - Mcmahon)  - 1 point each for:     History of CVA/TIA     ___       CHF                           ___   Ischemic cardiac disease      ___   Renal insufficiency (Cr > 2.0 mg/dL)      ___  [facility] Diabetes requiring insulin          ___   Orthopedic, supra-inguinal vascular, intra-thoracic, intra-abdominal surgical site ___       0-1 = low risk  ? 2 = elevated risk      Current medications which may produce withdrawal symptoms if withheld perioperatively: none      Plan:     1. Preoperative workup as follows: none  2.  Change in medication regimen before surgery: None  3. Prophylaxis for cardiac events with perioperative beta-blockers: Currently taking  atenolol  ACC/AHA indications for pre-operative beta-blocker use:    · Vascular surgery with history of postitive stress test  · Intermediate or high risk surgery with history of CAD   · Intermediate or high risk surgery with multiple clinical predictors of CAD- 2 of the following: history of compensated or prior heart failure, history of cerebrovascular disease, DM, or renal insufficiency    Routine administration of higher-dose, long-acting metoprolol in beta-blocker-naïve patients on the day of surgery, and in the absence of dose titration is associated with an overall increase in mortality. Beta-blockers should be started days to weeks prior to surgery and titrated to pulse < 70.  4. Deep vein thrombosis prophylaxis: not necessary  5. No contraindications to planned surgery            Thank you for the referral,          FREDYD Barron M.D., [facility]        (Copy of consult was returned to the requesting provider on the day of completion.)    1/13/2020    3:10 PM    Scribe attestation: Harley Duggan RN, am scribing for and in the presence of Marcia Kellogg MD. Electronically signed by Benjamin Blakely RN on 1/13/2020 at 3:10 PM    [unfilled]

## 2020-03-12 ENCOUNTER — TELEPHONE (OUTPATIENT)
Dept: FAMILY MEDICINE CLINIC | Age: 85
End: 2020-03-12

## 2020-03-12 NOTE — TELEPHONE ENCOUNTER
Pt's  called and said that pt has an abscess in her mouth and has taken Amoxicillin 500 mg for it before and it helped. Was wanting to see if he could get [facility].

## 2020-03-13 RX ORDER — AMOXICILLIN 500 MG/1
500 CAPSULE ORAL 3 TIMES DAILY
Qty: 21 CAPSULE | Refills: 0 | Status: SHIPPED | OUTPATIENT
Start: 2020-03-13 | End: 2020-03-20

## 2020-04-06 RX ORDER — LEVOTHYROXINE SODIUM 0.03 MG/1
TABLET ORAL
Qty: 90 TABLET | Refills: 0 | Status: SHIPPED | OUTPATIENT
Start: 2020-04-06 | End: 2020-06-17 | Stop reason: DRUGHIGH

## 2020-04-27 NOTE — TELEPHONE ENCOUNTER
Called the [facility] to reschedule their upcoming appointments to June and Dr Hetal Mae stated that Lashay Hanson would need a new RX for Eliquis 2.5 mg, as per your discussion, she is currently on 5 mg bid, please advise or send to [address] Also I wasn't sure if 2.5 bid or daily?

## 2020-06-16 ENCOUNTER — OFFICE VISIT (OUTPATIENT)
Dept: FAMILY MEDICINE CLINIC | Age: 85
End: 2020-06-16
Payer: COMMERCIAL

## 2020-06-16 VITALS
WEIGHT: 166 LBS | BODY MASS INDEX: 30.36 KG/M2 | SYSTOLIC BLOOD PRESSURE: 141 MMHG | HEART RATE: 55 BPM | DIASTOLIC BLOOD PRESSURE: 60 MMHG | TEMPERATURE: 98.5 F

## 2020-06-16 PROCEDURE — 36415 COLL VENOUS BLD VENIPUNCTURE: CPT | Performed by: FAMILY MEDICINE

## 2020-06-16 PROCEDURE — 99214 OFFICE O/P EST MOD 30 MIN: CPT | Performed by: FAMILY MEDICINE

## 2020-06-16 RX ORDER — LOSARTAN POTASSIUM 25 MG/1
TABLET ORAL
Qty: 90 TABLET | Refills: 11 | Status: SHIPPED | OUTPATIENT
Start: 2020-06-16 | End: 2020-08-21 | Stop reason: SDUPTHER

## 2020-06-16 ASSESSMENT — PATIENT HEALTH QUESTIONNAIRE - PHQ9
SUM OF ALL RESPONSES TO PHQ9 QUESTIONS 1 & 2: 0
1. LITTLE INTEREST OR PLEASURE IN DOING THINGS: 0
2. FEELING DOWN, DEPRESSED OR HOPELESS: 0
SUM OF ALL RESPONSES TO PHQ QUESTIONS 1-9: 0
SUM OF ALL RESPONSES TO PHQ QUESTIONS 1-9: 0

## 2020-06-16 NOTE — PROGRESS NOTES
and sent those. She remains on sulfasalazine. Vitamin D levels will be monitored as needed continue lipid monitoring as needed. Her  who is a cardiologist retired 1 to know if the patient should be on lovastatin.

## 2020-06-17 LAB
A/G RATIO: 2 (ref 1.1–2.2)
ALBUMIN SERPL-MCNC: 3.9 G/DL (ref 3.4–5)
ALP BLD-CCNC: 78 U/L (ref 40–129)
ALT SERPL-CCNC: 16 U/L (ref 10–40)
ANION GAP SERPL CALCULATED.3IONS-SCNC: 8 MMOL/L (ref 3–16)
AST SERPL-CCNC: 23 U/L (ref 15–37)
BASOPHILS ABSOLUTE: 0.1 K/UL (ref 0–0.2)
BASOPHILS RELATIVE PERCENT: 1.8 %
BILIRUB SERPL-MCNC: 0.6 MG/DL (ref 0–1)
BUN BLDV-MCNC: 18 MG/DL (ref 7–20)
C-REACTIVE PROTEIN: 2.6 MG/L (ref 0–5.1)
CALCIUM SERPL-MCNC: 9.5 MG/DL (ref 8.3–10.6)
CHLORIDE BLD-SCNC: 103 MMOL/L (ref 99–110)
CHOLESTEROL, TOTAL: 182 MG/DL (ref 0–199)
CO2: 28 MMOL/L (ref 21–32)
CREAT SERPL-MCNC: 0.6 MG/DL (ref 0.6–1.2)
EOSINOPHILS ABSOLUTE: 0.4 K/UL (ref 0–0.6)
EOSINOPHILS RELATIVE PERCENT: 9.8 %
GFR AFRICAN AMERICAN: >60
GFR NON-AFRICAN AMERICAN: >60
GLOBULIN: 2 G/DL
GLUCOSE BLD-MCNC: 99 MG/DL (ref 70–99)
HCT VFR BLD CALC: 38.5 % (ref 36–48)
HDLC SERPL-MCNC: 61 MG/DL (ref 40–60)
HEMOGLOBIN: 12.6 G/DL (ref 12–16)
LDL CHOLESTEROL CALCULATED: 104 MG/DL
LYMPHOCYTES ABSOLUTE: 0.8 K/UL (ref 1–5.1)
LYMPHOCYTES RELATIVE PERCENT: 18.7 %
MCH RBC QN AUTO: 35.5 PG (ref 26–34)
MCHC RBC AUTO-ENTMCNC: 32.6 G/DL (ref 31–36)
MCV RBC AUTO: 108.7 FL (ref 80–100)
MONOCYTES ABSOLUTE: 0.4 K/UL (ref 0–1.3)
MONOCYTES RELATIVE PERCENT: 10.3 %
NEUTROPHILS ABSOLUTE: 2.6 K/UL (ref 1.7–7.7)
NEUTROPHILS RELATIVE PERCENT: 59.4 %
PDW BLD-RTO: 14.2 % (ref 12.4–15.4)
PLATELET # BLD: 173 K/UL (ref 135–450)
PMV BLD AUTO: 8.7 FL (ref 5–10.5)
POTASSIUM SERPL-SCNC: 4.1 MMOL/L (ref 3.5–5.1)
RBC # BLD: 3.54 M/UL (ref 4–5.2)
SEDIMENTATION RATE, ERYTHROCYTE: 11 MM/HR (ref 0–30)
SODIUM BLD-SCNC: 139 MMOL/L (ref 136–145)
T3 FREE: 2 PG/ML (ref 2.3–4.2)
T4 FREE: 1.3 NG/DL (ref 0.9–1.8)
TOTAL PROTEIN: 5.9 G/DL (ref 6.4–8.2)
TRIGL SERPL-MCNC: 83 MG/DL (ref 0–150)
TSH SERPL DL<=0.05 MIU/L-ACNC: 5.06 UIU/ML (ref 0.27–4.2)
VITAMIN D 25-HYDROXY: 143.2 NG/ML
VLDLC SERPL CALC-MCNC: 17 MG/DL
WBC # BLD: 4.3 K/UL (ref 4–11)

## 2020-06-17 RX ORDER — LEVOTHYROXINE SODIUM 0.15 MG/1
150 TABLET ORAL DAILY
Qty: 90 TABLET | Refills: 0 | Status: SHIPPED | OUTPATIENT
Start: 2020-06-17 | End: 2020-09-14

## 2020-07-14 ENCOUNTER — TELEPHONE (OUTPATIENT)
Dept: PHARMACY | Facility: CLINIC | Age: 85
End: 2020-07-14

## 2020-07-14 NOTE — TELEPHONE ENCOUNTER
CLINICAL PHARMACY: ADHERENCE REVIEW  Identified care gap per [employer]; fills at [facility]: ACE/ARB adherence    Last Office Visit: 3/4/20 wt prescriber ([address] [place] [address] cardiology)      ASSESSMENT  ACE/ARB ADHERENCE  [facility]: 84% in 2019; 1st fill YTD per [employer] report as of 6/13/20    Per [facility] and chart review of CareEverywhere: Losartan 100mg, 1/2 tab BID, last filled on 6/15/20 for a 90 day supply; 0 refills remaining. (prev fill: losartan 50mg BID, 90-ds 2/24/20 - appears refill was requested 6/2, but losartan 50mg tabs are on backorder, so rx was updated/changed to the 100mg tab rx 6/15/20)    Per 6/16/20 [facility]) PCP OV: Blood pressure acceptable. Cozaar 50 mg is not available. She is only been taking 50 mg in the morning and 25 mg at evening. And adjusted prescription for 25 mg strength sent. - [facility] confirms 6/16/20 Losartan 25mg, 2 tabs qAM and 1 tab qPM, rx on profile for patient - 25mg tabs on backorder)    BP Readings from Last 3 Encounters:   06/16/20 (!) 141/60   01/13/20 138/60   09/17/19 132/64     CrCl cannot be calculated (Unknown ideal weight.). PLAN  Attempting to reach patient to review.  Left message asking for return call.   · What dose of losartan is she taking? (appears has rx for the 100mg tabs d/t 25mg & 50mg tabs being on backorder, but PCP appt 6/16 discussed taking losartan 50mg qAM and 25mg qPM?)  · 6/16 PCP OV also notes that patient's hsbd is retired cardiologist and asking if patient should be on a statin - PCP ordered lipid panel and was going to also send to patient's cardiologist - doesn't appear a statin has been ordered by [facility] PCP or [address] [place] [address] cardiologist at this time    Meghan Brown, WaldemarD, [address]  Direct: [phone]  Department, toll free: [phone], option 7

## 2020-07-15 NOTE — TELEPHONE ENCOUNTER
Patient and spouse returning call. Confirm having losartan 100mg tabs and taking 1/2 tab (50mg) qAM and 1/4 tab (25mg) qPM. Admit that the 1/4 tab doesn't always split evenly.  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 [facility] and [facility] to them (vs trying to check for any supply at a different pharmacy). State Dr Miguelangel Jacobs has not responded regarding a statin. Follow-up set for August to review if/what losartan tabs are available, if able to switch sooner to avoid splitting tabs in 1/4.    =======================================================   For Pharmacy Admin Tracking Only    PHSO: Yes  Total # of Interventions Recommended: 1  - New Order #: 0 New Medication Order Reason(s):  Adherence  - Maintenance Safety Lab Monitoring #: 1  - New Therapy Lab Monitoring #: 1  Recommended intervention potential cost savings: 0  Total Interventions Accepted: 0  Time Spent (min): 15

## 2020-08-14 ENCOUNTER — TELEPHONE (OUTPATIENT)
Dept: FAMILY MEDICINE CLINIC | Age: 85
End: 2020-08-14

## 2020-08-14 NOTE — TELEPHONE ENCOUNTER
I would take 5 mg twice a day for a week, then try to reduce back to 2.5 mg twice a day.   Please let us know middle next week how she is doing

## 2020-08-14 NOTE — TELEPHONE ENCOUNTER
----- Message from Alessandra Fernandez sent at 8/14/2020  9:53 AM EDT -----  Subject: Message to Provider    QUESTIONS  Information for Provider? pt  states her dvt is flaring up needs to   know how to dose medication  ---------------------------------------------------------------------------  --------------  CALL BACK INFO  What is the best way for the office to contact you? OK to leave message on   voicemail  Preferred Call Back Phone Number? [phone]  ---------------------------------------------------------------------------  --------------  SCRIPT ANSWERS  Relationship to Patient? Other  Representative Name?   Is the Representative on the appropriate HIPAA document in Epic?  Yes

## 2020-08-19 ENCOUNTER — TELEPHONE (OUTPATIENT)
Dept: FAMILY MEDICINE CLINIC | Age: 85
End: 2020-08-19

## 2020-08-20 NOTE — TELEPHONE ENCOUNTER
[facility], the Losartan in file is for 25mg and the directions are to take 2 tabs in the morning and 1 in the evening. This rx was written for a 30ds and has 11rr. A PA is needed for this medication to be paid for by the insurance company due to the directions.  This pharmacy does not have a 50 mg rx on file but they do have this medication in stock

## 2020-08-20 NOTE — TELEPHONE ENCOUNTER
Mo,   Please find out what losartan tablet sizes are available at the pharmacy. It appears that the patient's prescription has been switched multiple times due to losartan backorders. Patient needs to be taking losartan 50 mg in the morning and 25 mg in the evening. Please list what strengths are available and I will request new rx from PCP. Thank you!    rBooklyn Heck PharmD, [facility]  Direct: [phone]  Department, toll free [phone], option 7

## 2020-08-21 RX ORDER — LOSARTAN POTASSIUM 50 MG/1
TABLET ORAL
Qty: 135 TABLET | Refills: 1 | Status: SHIPPED | OUTPATIENT
Start: 2020-08-21 | End: 2021-03-18

## 2020-08-21 NOTE — TELEPHONE ENCOUNTER
Radha Solis MD    Patient's insurance will only cover a max of 2 tablets per day of losartan 25 mg. Patient's current instructions are losartan 50 mg in the morning and 25 mg in the evening. Appears the tablet strength was changed d/t backorder. Pharmacy confirms that they have losartan 50 mg in stock. I have pended a new order for losartan 50 mg Take 1 tab in AM and one-half tablet (25 mg) in the PM so that insurance will cover subsequent refills. Thank you,   Hugh Stearns PharmD, [facility]  Direct: [phone]  Department, toll free [phone], option 7    ==========================================================  Mo,   Please follow pended refill request and outreach to patient once authorized to inform her of the change in tablet size and provide new dosing instructions. Please outreach to the pharmacy to cancel old losartan prescription on file as well.      Thank you,   Hugh Stearns PharmD, [facility]  Direct: [phone]  Department, toll free [phone], option 7

## 2020-08-23 ENCOUNTER — PATIENT MESSAGE (OUTPATIENT)
Dept: FAMILY MEDICINE CLINIC | Age: 85
End: 2020-08-23

## 2020-08-24 NOTE — TELEPHONE ENCOUNTER
From: Lamonte Duarte  To: Edwin Mcrae MD  Sent: 8/23/2020 3:59 PM EDT  Subject: Visit Follow-Up Question    Regarding Vj's recurrent DVT, for the past 10 days she has been taking Eliquis 5 mg bid and using a soft, bulky wrap on the left leg. During the past week, the swelling has diminished significantly. Because, when we [address] last October '19, the DVT recurred within a few days and this time, after reducing the dose of Eliquis to 2.5 mg bid in June, the DVT has again recurred, should we not consider an extended, perhaps open ended, course of Eliquis 5 mg bid? I would expect the presence of both hip and knee prostheses would be a predisposing factor for DVT. Thanks,   Gene  I understand Dr. Quan Soares has been ill, hope all is now well.

## 2020-08-24 NOTE — TELEPHONE ENCOUNTER
Even with the increased risk of bleeding, I believe it would be appropriate to continue the Eliquis at 5 mg p.o. twice daily indefinitely

## 2020-09-04 RX ORDER — OXYBUTYNIN CHLORIDE 10 MG/1
TABLET, EXTENDED RELEASE ORAL
Qty: 90 TABLET | Refills: 3 | Status: SHIPPED | OUTPATIENT
Start: 2020-09-04 | End: 2021-09-02

## 2020-09-04 NOTE — TELEPHONE ENCOUNTER
Last OV 6/16/20  Future Appointments   Date Time Provider Victor M Aguiar   12/21/2020  1:40 PM Yoni Zelaya MD [facility]

## 2020-09-09 ENCOUNTER — TELEPHONE (OUTPATIENT)
Dept: FAMILY MEDICINE CLINIC | Age: 85
End: 2020-09-09

## 2020-09-09 ENCOUNTER — VIRTUAL VISIT (OUTPATIENT)
Dept: FAMILY MEDICINE CLINIC | Age: 85
End: 2020-09-09
Payer: COMMERCIAL

## 2020-09-09 PROCEDURE — 99213 OFFICE O/P EST LOW 20 MIN: CPT | Performed by: NURSE PRACTITIONER

## 2020-09-09 RX ORDER — CIPROFLOXACIN 250 MG/1
250 TABLET, FILM COATED ORAL 2 TIMES DAILY
Qty: 10 TABLET | Refills: 0 | Status: SHIPPED | OUTPATIENT
Start: 2020-09-09 | End: 2020-11-11 | Stop reason: SDUPTHER

## 2020-09-09 ASSESSMENT — ENCOUNTER SYMPTOMS
RESPIRATORY NEGATIVE: 1
VOMITING: 0
NAUSEA: 0

## 2020-09-09 NOTE — PROGRESS NOTES
2020    TELEHEALTH EVALUATION -- Audio Visit (During CRTYL-28 public health emergency)    HPI:    Liam Turk (:  1927) has requested an audio evaluation for the following concern(s):    Urinary Tract Infection    This is a new problem. The current episode started yesterday. The problem has been gradually improving (She took Cipro yesterday that was given to her by her daughter ). The pain is at a severity of 0/10. The patient is experiencing no pain. The maximum temperature recorded prior to her arrival was 102 - 102.9 F (Yesterday; today temperature 99.2 after taking Tylenol one hour prior). The fever has been present for 1 - 2 days. Associated symptoms include chills and urgency (Chronic ). Pertinent negatives include no discharge, flank pain, frequency, hematuria, hesitancy, nausea, possible pregnancy, sweats or vomiting. She has tried antibiotics (Cipro X 2 doses ) for the symptoms. The treatment provided no relief. There is no history of catheterization, kidney stones, recurrent UTIs, a single kidney, urinary stasis or a urological procedure. Juan Pablo Robison presents for a virtual visit to discuss a possible uti. She has been weak. She was having cloudy urine yesterday and had a foul odor yesterday. Since taking the Cipro, her urine has became more clear and not as cloudy. Her strength has improved. Review of Systems   Constitutional: Positive for chills and fatigue. Respiratory: Negative. Cardiovascular: Negative. Gastrointestinal: Negative for nausea and vomiting. Genitourinary: Positive for urgency (Chronic ). Negative for decreased urine volume, difficulty urinating, dyspareunia, dysuria, enuresis, flank pain, frequency, genital sores, hematuria, hesitancy, menstrual problem, pelvic pain, vaginal bleeding, vaginal discharge and vaginal pain. Cloudy urine and foul smelling urine     Skin: Negative. Neurological: Negative. Psychiatric/Behavioral: Negative.         Prior Date    Arthritis     Arthritis     possibly Rheumatoid, Dr. Jaylon Arboleda CAD (coronary artery disease)     GERD (gastroesophageal reflux disease)     Hyperlipidemia     Hypertension     Thyroid disease    ,   Past Surgical History:   Procedure Laterality Date    CHOLECYSTECTOMY      CORONARY ANGIOPLASTY WITH STENT PLACEMENT      JOINT REPLACEMENT      \"knees and hips\"    DC ESOPHAGOGASTRODUODENOSCOPY TRANSORAL DIAGNOSTIC N/A 11/7/2018    EGD ESOPHAGOGASTRODUODENOSCOPY performed by Melva Rocha MD at [address]   ,   Social History     Tobacco Use    Smoking status: Never Smoker    Smokeless tobacco: Never Used   Substance Use Topics    Alcohol use: No    Drug use: No   , No family history on file.,   Immunization History   Administered Date(s) Administered    Influenza Vaccine, unspecified formulation 10/28/2015    Influenza Virus Vaccine 11/29/2012, 10/24/2013, 10/28/2015, 09/01/2016    Influenza Whole 10/14/2017    Influenza, Quadv, IM, PF (6 mo and older Fluzone, Flulaval, Fluarix, and 3 yrs and older Afluria) 09/17/2019    Pneumococcal Conjugate 13-valent ([id number]) 04/06/2015    Pneumococcal Polysaccharide ([id number]) 11/03/2009    Td, unspecified formulation 11/01/2006    Zoster Live (Zostavax) 01/01/2005   ,   Health Maintenance   Topic Date Due    DTaP/Tdap/Td vaccine (1 - Tdap) 04/13/1946    Shingles Vaccine (2 of 3) 02/26/2005    Flu vaccine (1) 09/01/2020    TSH testing  06/16/2021    Potassium monitoring  06/16/2021    Creatinine monitoring  06/16/2021    Pneumococcal 65+ years Vaccine  Completed    Hepatitis A vaccine  Aged Out    Hepatitis B vaccine  Aged Out    Hib vaccine  Aged Out    Meningococcal (ACWY) vaccine  Aged Out       PHYSICAL EXAMINATION:  [ INSTRUCTIONS:  \"[x]\" Indicates a positive item  \"[]\" Indicates a negative item      Constitutional: [x] Appears well-developed and well-nourished [x] No apparent distress      [] Abnormal-   Mental status  [x] Alert and awake  [x] Oriented to person/place/time [x]Able to follow commands      Eyes:  EOM    [x]  Normal  [] Abnormal-  Sclera  [x]  Normal  [] Abnormal -         Discharge [x]  None visible  [] Abnormal -    HENT:   [x] Normocephalic, atraumatic. [] Abnormal   [x] Mouth/Throat: Mucous membranes are moist.     External Ears [x] Normal  [] Abnormal-     Neck: [x] No visualized mass     Pulmonary/Chest: [x] Respiratory effort normal.  [x] No visualized signs of difficulty breathing or respiratory distress        [] Abnormal-      Musculoskeletal:   [x] Normal gait with no signs of ataxia         [x] Normal range of motion of neck        [] Abnormal-       Neurological:        [x] No Facial Asymmetry (Cranial nerve 7 motor function) (limited exam to video visit)          [x] No gaze palsy        [] Abnormal-         Skin:        [x] No significant exanthematous lesions or discoloration noted on facial skin         [] Abnormal-            Psychiatric:       [x] Normal Affect [x] No Hallucinations        [] Abnormal-     Other pertinent observable physical exam findings-     ASSESSMENT/PLAN:  Juan Pablo Robison was seen today for urinary tract infection. Patient already feeling and doing better since being on Cipro that her daughter gave her. Her urine is now getting clearer and has less of an odor. Her strength has improved. Daughter states she would not be able to drop off a UA or a urine culture until tomorrow. Since she is already taking antibiotic and doing better, recommend completing antibiotic and calling if symptoms do not continue to improve or if symptoms worsen. Diagnoses and all orders for this visit:    Acute UTI  -     ciprofloxacin (CIPRO) 250 MG tablet; Take 1 tablet by mouth 2 times daily for 5 days        Return if symptoms worsen or fail to improve. Liam Turk is a 80 y.o. female being evaluated by a Virtual Visit (video visit) encounter to address concerns as mentioned above.   MANAN

## 2020-09-09 NOTE — TELEPHONE ENCOUNTER
Called and spoke with Gene(WILLIAM) and they can do a telephone visit, they weren't able to do the 2:40p but could do the 3pm today.

## 2020-09-09 NOTE — PROGRESS NOTES
Rodolfo Mejia is a 80 y.o. female evaluated via telephone on 9/9/2020. Consent:  She and/or health care decision maker is aware that that she may receive a bill for this telephone service, depending on her insurance coverage, and has provided verbal consent to proceed: Yes      Documentation:  I communicated with the patient and/or health care decision maker about UTI. Details of this discussion including any medical advice provided: n/a    I affirm this is a Patient Initiated Episode with an Established Patient who has not had a related appointment within my department in the past 7 days or scheduled within the next 24 hours.     Total Time: minutes: 5-10 minutes    Note: not billable if this call serves to triage the patient into an appointment for the relevant concern      Li Amin

## 2020-09-09 NOTE — TELEPHONE ENCOUNTER
----- Message from Naomie Redd sent at 9/9/2020  1:05 PM EDT -----  Subject: Message to Provider    QUESTIONS  Information for Provider?  states patient has UTI symptoms and is   wondering if there is anyway we can call her something in.   ---------------------------------------------------------------------------  --------------  [address]  What is the best way for the office to contact you? OK to leave message on   voicemail  Preferred Call Back Phone Number? [phone]  ---------------------------------------------------------------------------  --------------  SCRIPT ANSWERS  Relationship to Patient? Other  Representative Name? Taye Barry  Is the Representative on the appropriate HIPAA document in Epic?  Yes

## 2020-09-14 RX ORDER — LEVOTHYROXINE SODIUM 0.15 MG/1
TABLET ORAL
Qty: 90 TABLET | Refills: 0 | Status: SHIPPED | OUTPATIENT
Start: 2020-09-14 | End: 2020-12-18

## 2020-09-14 NOTE — TELEPHONE ENCOUNTER
Last OV 9/9/20  Future Appointments   Date Time Provider Victor M Aguiar   12/21/2020  1:40 PM Tess Del Valle MD [facility]

## 2020-10-08 ENCOUNTER — NURSE ONLY (OUTPATIENT)
Dept: FAMILY MEDICINE CLINIC | Age: 85
End: 2020-10-08
Payer: COMMERCIAL

## 2020-10-08 PROCEDURE — 36415 COLL VENOUS BLD VENIPUNCTURE: CPT | Performed by: FAMILY MEDICINE

## 2020-10-08 PROCEDURE — 90471 IMMUNIZATION ADMIN: CPT | Performed by: FAMILY MEDICINE

## 2020-10-08 PROCEDURE — 90686 IIV4 VACC NO PRSV 0.5 ML IM: CPT | Performed by: FAMILY MEDICINE

## 2020-10-08 NOTE — PROGRESS NOTES
Patient has a standing order for Dr. Lackey  at [facility]. Orders are scanned in patients chart.

## 2020-10-09 LAB
T3 FREE: 2 PG/ML (ref 2.3–4.2)
T4 FREE: 1.5 NG/DL (ref 0.9–1.8)
TSH SERPL DL<=0.05 MIU/L-ACNC: 4.12 UIU/ML (ref 0.27–4.2)

## 2020-11-10 ENCOUNTER — TELEPHONE (OUTPATIENT)
Dept: FAMILY MEDICINE CLINIC | Age: 85
End: 2020-11-10

## 2020-11-10 NOTE — TELEPHONE ENCOUNTER
Dr. Reina Serum office called requesting pt's recent blood work. Printed documentation and faxed to [phone]. Fax confirmation attached.

## 2020-11-11 RX ORDER — CIPROFLOXACIN 250 MG/1
250 TABLET, FILM COATED ORAL 2 TIMES DAILY
Qty: 10 TABLET | Refills: 0 | Status: SHIPPED | OUTPATIENT
Start: 2020-11-11 | End: 2020-11-16

## 2020-12-18 RX ORDER — LEVOTHYROXINE SODIUM 0.15 MG/1
TABLET ORAL
Qty: 90 TABLET | Refills: 0 | Status: SHIPPED | OUTPATIENT
Start: 2020-12-18 | End: 2021-06-22

## 2020-12-21 ENCOUNTER — VIRTUAL VISIT (OUTPATIENT)
Dept: FAMILY MEDICINE CLINIC | Age: 85
End: 2020-12-21
Payer: COMMERCIAL

## 2020-12-21 PROBLEM — N39.0 RECURRENT UTI: Status: ACTIVE | Noted: 2020-12-21

## 2020-12-21 PROBLEM — I87.2 VENOUS INSUFFICIENCY OF BOTH LOWER EXTREMITIES: Status: ACTIVE | Noted: 2020-12-21

## 2020-12-21 PROCEDURE — 99441 PR PHYS/QHP TELEPHONE EVALUATION 5-10 MIN: CPT | Performed by: FAMILY MEDICINE

## 2020-12-21 RX ORDER — CIPROFLOXACIN 250 MG/1
250 TABLET, FILM COATED ORAL 2 TIMES DAILY
Qty: 30 TABLET | Refills: 2 | Status: SHIPPED | OUTPATIENT
Start: 2020-12-21 | End: 2021-01-05

## 2020-12-21 NOTE — PROGRESS NOTES
Tam Hernandez is a 80 y.o. female evaluated via telephone on 12/21/2020. Linda Art states that she much better, she is not having anymore symptoms of UTI. Her leg swelling has also improved it is still there but tolerable.  is concerned though due her frequent UTIs maybe having a PRN cirpo or bactrim order so that when she gets a really bad UTI they can easily get antibiotics for her due to her getting so symptomatic so quick and when she has fevers she turns to a wet \"noodle\" he isn't able to help her when she gets to these points. ASSESSMENT PLAN      Diagnosis Orders   1. Recurrent UTI  ciprofloxacin (CIPRO) 250 MG tablet   2. Primary osteoarthritis involving multiple joints     3. Rheumatoid arthritis involving multiple sites with positive rheumatoid factor ([facility] [address]     4. Venous insufficiency of both lower extremities     it is reasonable in this circumstance to allow patient keeps Cipro on hand as she gets very sick very quickly. This only happens once or twice a year ago so do not believe prophylactic antibiotics are warranted. Her arthritis remains significant but stable. Her leg swelling is at baseline. Follow-up in office 6 months. I, Dr. Ashley Aguilar, personally performed the services described in this documentation, as scribed by the above signed scribe in my presence, and it is both accurate and complete. I agree with the ROS and Past Histories independently gathered by the clinical support staff and the remaining scribed note accurately describes my personal service to the patient. 12/21/2020  2:03 PM      Consent:  She and/or health care decision maker is aware that that she may receive a bill for this telephone service, depending on her insurance coverage, and has provided verbal consent to proceed: Yes      Documentation:  I communicated with the patient and/or health care decision maker about see above. Details of this discussion including any medical advice provided: see above      I affirm this is a Patient Initiated Episode with an Established Patient who has not had a related appointment within my department in the past 7 days or scheduled within the next 24 hours.     Total Time: minutes: 5-10 minutes    Note: not billable if this call serves to triage the patient into an appointment for the relevant concern      Jeremie Tim

## 2021-02-16 ENCOUNTER — TELEPHONE (OUTPATIENT)
Dept: FAMILY MEDICINE CLINIC | Age: 86
End: 2021-02-16

## 2021-02-16 NOTE — TELEPHONE ENCOUNTER
[facility] is calling to let us know pt is post-poning the start of care be done on Friday instead of any sooner. Pt is concerned with driveway status with the weather. [facility] needs the okay to do so.  Please call back Susie Leyva with ok [phone]

## 2021-02-16 NOTE — TELEPHONE ENCOUNTER
Thang Huerta called and informed that dr. Aric Cheatham is okay with delaying start of patient care.

## 2021-02-22 RX ORDER — METOPROLOL SUCCINATE 25 MG/1
12.5 TABLET, EXTENDED RELEASE ORAL DAILY
Qty: 15 TABLET | Refills: 5 | Status: SHIPPED | OUTPATIENT
Start: 2021-02-22

## 2021-02-22 RX ORDER — LANOLIN ALCOHOL/MO/W.PET/CERES
400 CREAM (GRAM) TOPICAL DAILY
Qty: 30 TABLET | Refills: 5 | Status: SHIPPED | OUTPATIENT
Start: 2021-02-22 | End: 2021-06-18

## 2021-02-22 RX ORDER — POTASSIUM CHLORIDE 20 MEQ/1
20 TABLET, EXTENDED RELEASE ORAL DAILY
Qty: 30 TABLET | Refills: 5 | Status: SHIPPED | OUTPATIENT
Start: 2021-02-22 | End: 2021-10-26 | Stop reason: SDUPTHER

## 2021-03-04 ENCOUNTER — TELEPHONE (OUTPATIENT)
Dept: FAMILY MEDICINE CLINIC | Age: 86
End: 2021-03-04

## 2021-03-04 NOTE — TELEPHONE ENCOUNTER
Alyson Osnua with [address] called to report that pt's pulse was at 40 today. States that pt does have a halter monitor and an appt scheduled with cardio next Wednesday. Pt has no other symptoms but a little tired.  Any questions call Alyson Osuna [phone]

## 2021-03-05 NOTE — TELEPHONE ENCOUNTER
Our medication record indicates both metoprolol and atenolol, please call to verify this is not the case. Would suggest to call to cardiology regarding their recommendation of what to do with the beta-blocker she is on.

## 2021-03-18 DIAGNOSIS — I10 ESSENTIAL HYPERTENSION: ICD-10-CM

## 2021-03-18 RX ORDER — LOSARTAN POTASSIUM 50 MG/1
TABLET ORAL
Qty: 135 TABLET | Refills: 1 | Status: SHIPPED | OUTPATIENT
Start: 2021-03-18 | End: 2021-09-22

## 2021-05-19 ENCOUNTER — HOSPITAL ENCOUNTER (OUTPATIENT)
Age: 86
Discharge: HOME | End: 2021-05-19
Payer: MEDICARE

## 2021-05-19 LAB
ALBUMIN SERPL-MCNC: 3.9 G/DL (ref 3.4–5)
ALP BLD-CCNC: 99 U/L (ref 40–129)
ALT SERPL-CCNC: 13 U/L (ref 10–40)
AST SERPL-CCNC: 25 U/L (ref 15–37)
BASOPHILS ABSOLUTE: 0.1 K/UL (ref 0–0.2)
BASOPHILS RELATIVE PERCENT: 1.2 %
BILIRUB SERPL-MCNC: 0.4 MG/DL (ref 0–1)
BILIRUBIN DIRECT: <0.2 MG/DL (ref 0–0.3)
BILIRUBIN, INDIRECT: ABNORMAL MG/DL (ref 0–1)
CREAT SERPL-MCNC: 0.6 MG/DL (ref 0.6–1.2)
EOSINOPHILS ABSOLUTE: 0.2 K/UL (ref 0–0.6)
EOSINOPHILS RELATIVE PERCENT: 3.2 %
GFR AFRICAN AMERICAN: >60
GFR NON-AFRICAN AMERICAN: >60
HCT VFR BLD CALC: 33.9 % (ref 36–48)
HEMOGLOBIN: 11.4 G/DL (ref 12–16)
LYMPHOCYTES ABSOLUTE: 0.9 K/UL (ref 1–5.1)
LYMPHOCYTES RELATIVE PERCENT: 17 %
MCH RBC QN AUTO: 35.8 PG (ref 26–34)
MCHC RBC AUTO-ENTMCNC: 33.5 G/DL (ref 31–36)
MCV RBC AUTO: 106.9 FL (ref 80–100)
MONOCYTES ABSOLUTE: 0.6 K/UL (ref 0–1.3)
MONOCYTES RELATIVE PERCENT: 11.5 %
NEUTROPHILS ABSOLUTE: 3.6 K/UL (ref 1.7–7.7)
NEUTROPHILS RELATIVE PERCENT: 67.1 %
PDW BLD-RTO: 13.4 % (ref 12.4–15.4)
PLATELET # BLD: 177 K/UL (ref 135–450)
PMV BLD AUTO: 8.3 FL (ref 5–10.5)
RBC # BLD: 3.17 M/UL (ref 4–5.2)
TOTAL PROTEIN: 6.2 G/DL (ref 6.4–8.2)
WBC # BLD: 5.4 K/UL (ref 4–11)

## 2021-05-19 PROCEDURE — 80076 HEPATIC FUNCTION PANEL: CPT

## 2021-05-19 PROCEDURE — 36415 COLL VENOUS BLD VENIPUNCTURE: CPT

## 2021-05-19 PROCEDURE — 84443 ASSAY THYROID STIM HORMONE: CPT

## 2021-05-19 PROCEDURE — 84439 ASSAY OF FREE THYROXINE: CPT

## 2021-05-19 PROCEDURE — 84481 FREE ASSAY (FT-3): CPT

## 2021-05-19 PROCEDURE — 82565 ASSAY OF CREATININE: CPT

## 2021-05-19 PROCEDURE — 85025 COMPLETE CBC W/AUTO DIFF WBC: CPT

## 2021-05-20 LAB
T3 FREE: 1.9 PG/ML (ref 2.3–4.2)
T4 FREE: 1.6 NG/DL (ref 0.9–1.8)
TSH SERPL DL<=0.05 MIU/L-ACNC: 2.53 UIU/ML (ref 0.27–4.2)

## 2021-06-18 RX ORDER — MAGNESIUM OXIDE 400 MG/1
TABLET ORAL
Qty: 90 TABLET | Refills: 1 | Status: SHIPPED | OUTPATIENT
Start: 2021-06-18 | End: 2021-09-27

## 2021-06-18 NOTE — TELEPHONE ENCOUNTER
Last visit was 12/21/20  Future Appointments   Date Time Provider Victor M Aguiar   6/21/2021  3:40 PM MD Daniel [facility] - [facility]

## 2021-06-21 ENCOUNTER — OFFICE VISIT (OUTPATIENT)
Dept: FAMILY MEDICINE CLINIC | Age: 86
End: 2021-06-21
Payer: MEDICARE

## 2021-06-21 VITALS
SYSTOLIC BLOOD PRESSURE: 122 MMHG | BODY MASS INDEX: 30.36 KG/M2 | HEIGHT: 62 IN | OXYGEN SATURATION: 90 % | DIASTOLIC BLOOD PRESSURE: 60 MMHG | HEART RATE: 67 BPM

## 2021-06-21 DIAGNOSIS — K21.00 GASTROESOPHAGEAL REFLUX DISEASE WITH ESOPHAGITIS WITHOUT HEMORRHAGE: ICD-10-CM

## 2021-06-21 DIAGNOSIS — I25.83 CORONARY ARTERY DISEASE DUE TO LIPID RICH PLAQUE: ICD-10-CM

## 2021-06-21 DIAGNOSIS — I10 ESSENTIAL HYPERTENSION: Primary | ICD-10-CM

## 2021-06-21 DIAGNOSIS — D63.8 ANEMIA OF CHRONIC DISEASE: ICD-10-CM

## 2021-06-21 DIAGNOSIS — K43.9 VENTRAL HERNIA WITHOUT OBSTRUCTION OR GANGRENE: ICD-10-CM

## 2021-06-21 DIAGNOSIS — E03.9 ACQUIRED HYPOTHYROIDISM: ICD-10-CM

## 2021-06-21 DIAGNOSIS — L84 CORN OF FOOT: ICD-10-CM

## 2021-06-21 DIAGNOSIS — M15.9 PRIMARY OSTEOARTHRITIS INVOLVING MULTIPLE JOINTS: ICD-10-CM

## 2021-06-21 DIAGNOSIS — I82.412 DVT OF DEEP FEMORAL VEIN, LEFT (HCC): ICD-10-CM

## 2021-06-21 DIAGNOSIS — E78.2 MIXED HYPERLIPIDEMIA: ICD-10-CM

## 2021-06-21 DIAGNOSIS — M05.79 RHEUMATOID ARTHRITIS INVOLVING MULTIPLE SITES WITH POSITIVE RHEUMATOID FACTOR (HCC): ICD-10-CM

## 2021-06-21 DIAGNOSIS — E55.9 VITAMIN D DEFICIENCY: ICD-10-CM

## 2021-06-21 DIAGNOSIS — I25.10 CORONARY ARTERY DISEASE DUE TO LIPID RICH PLAQUE: ICD-10-CM

## 2021-06-21 PROCEDURE — 99214 OFFICE O/P EST MOD 30 MIN: CPT | Performed by: FAMILY MEDICINE

## 2021-06-21 SDOH — ECONOMIC STABILITY: FOOD INSECURITY: WITHIN THE PAST 12 MONTHS, YOU WORRIED THAT YOUR FOOD WOULD RUN OUT BEFORE YOU GOT MONEY TO BUY MORE.: NEVER TRUE

## 2021-06-21 SDOH — ECONOMIC STABILITY: FOOD INSECURITY: WITHIN THE PAST 12 MONTHS, THE FOOD YOU BOUGHT JUST DIDN'T LAST AND YOU DIDN'T HAVE MONEY TO GET MORE.: NEVER TRUE

## 2021-06-21 ASSESSMENT — PATIENT HEALTH QUESTIONNAIRE - PHQ9
2. FEELING DOWN, DEPRESSED OR HOPELESS: 0
SUM OF ALL RESPONSES TO PHQ QUESTIONS 1-9: 0
1. LITTLE INTEREST OR PLEASURE IN DOING THINGS: 0
SUM OF ALL RESPONSES TO PHQ9 QUESTIONS 1 & 2: 0
SUM OF ALL RESPONSES TO PHQ QUESTIONS 1-9: 0
SUM OF ALL RESPONSES TO PHQ QUESTIONS 1-9: 0

## 2021-06-21 ASSESSMENT — SOCIAL DETERMINANTS OF HEALTH (SDOH): HOW HARD IS IT FOR YOU TO PAY FOR THE VERY BASICS LIKE FOOD, HOUSING, MEDICAL CARE, AND HEATING?: NOT HARD AT ALL

## 2021-06-21 NOTE — PROGRESS NOTES
Chief Complaint   Patient presents with    Hypertension    Hyperlipidemia    Hypothyroidism    Other     patient has multiple medical complaints        Internal Administration   First Dose      Second Dose           Last COVID Lab No results found for: SARS-COV-2, SARS-COV-2 RNA, SARS-COV-2, SARS-COV-2, SARS-COV-2 BY PCR, SARS-COV-2, SARS-COV-2, SARS-COV-2          Wt Readings from Last 3 Encounters:   20 166 lb (75.3 kg)   20 167 lb 6.4 oz (75.9 kg)   19 160 lb (72.6 kg)     BP Readings from Last 3 Encounters:   20 (!) 141/60   20 138/60   19 132/64      No results found for: LABA1C    HPI:  Lesia Sahu is a 80 y.o. (: 1927) here today for    Denies any leg swelling continues to wear her compression stockings. Acid reflux doing well. Denies any heart burn. No chest pain or shortness of breath. She was in the hospital in January for bradycardia no more issues. She is doing well on her metoprolol dosing at this time. blood pressure well controlled and her heart rate seems to be stable for her. No recent issues with UTI. She is complaining of some issues with a corn on her foot. They have been doing corn pads to help in remove this growth from the foot. Explained that if no benefit then we can refer her to a podiatrist.     [x] Patient has completed an advance directive  [] Patient has NOT completed an advanced directive  [x] Patient has a documented healthcare surrogate  [] Patient does NOT have a documented healthcare surrogate  [] Discussed the importance of establishing and updating an advanced directive. Patient has questions at this time and those were answered. [x] Discussed the importance of establishing and updating an advanced directive. Patient does NOT have questions at this time.     Discussed with: [x] Patient            [] Family             [] Other caregiver    Patient's medications, allergies, past medical, surgical, social and family histories were reviewed and updated asappropriate on 6/21/2021 at 3:54 PM.    ROS:  Review of Systems    All other systems reviewed and are negative except as noted above on 6/21/2021 at 3:54 PM. Additional review of systems may be scanned into the media section ofthis medical record. Any responses requiring further intervention were pursued. Past Medical History:   Diagnosis Date    Arthritis     Arthritis     possibly Rheumatoid, Dr. Willis Pagan CAD (coronary artery disease)     GERD (gastroesophageal reflux disease)     Hyperlipidemia     Hypertension     Thyroid disease      History reviewed. No pertinent family history. Social History     Socioeconomic History    Marital status:      Spouse name: Not on file    Number of children: Not on file    Years of education: Not on file    Highest education level: Not on file   Occupational History    Not on file   Tobacco Use    Smoking status: Never Smoker    Smokeless tobacco: Never Used   Vaping Use    Vaping Use: Never used   Substance and Sexual Activity    Alcohol use: No    Drug use: No    Sexual activity: Not on file   Other Topics Concern    Not on file   Social History Narrative    Not on file     Social Determinants of Health     Financial Resource Strain: Low Risk     Difficulty of Paying Living Expenses: Not hard at all   Food Insecurity: No Food Insecurity    Worried About [address] in the Last Year: Never true    [facility] in the Last Year: Never true   Transportation Needs:     Lack of Transportation (Medical):      Lack of Transportation (Non-Medical):    Physical Activity:     Days of Exercise per Week:     Minutes of Exercise per Session:    Stress:     Feeling of Stress :    Social Connections:     Frequency of Communication with Friends and Family:     Frequency of Social Gatherings with Friends and Family:     Attends [employer] Services:     Active Member of Clubs or Organizations:     Attends Club or Organization Meetings:     Marital Status:    Intimate Partner Violence:     Fear of Current or Ex-Partner:     Emotionally Abused:     Physically Abused:     Sexually Abused:      Prior to Visit Medications    Medication Sig Taking? Authorizing Provider   magnesium oxide (MAG-OX) 400 MG tablet TAKE 1 TABLET BY MOUTH EVERY DAY  Cindy Woodward MD   losartan (COZAAR) 50 MG tablet Take half tablet (25mg) by mouth in the morning and one tablet (50 mg) in the evening.   Cindy Woodward MD   potassium chloride (KLOR-CON M) 20 MEQ extended release tablet Take 1 tablet by mouth daily  Cindy Woodward MD   metoprolol succinate (TOPROL XL) 25 MG extended release tablet Take 0.5 tablets by mouth daily  Cindy Woodward MD   levothyroxine (SYNTHROID) 150 MCG tablet TAKE 1 TABLET BY MOUTH EVERY DAY  Cindy Woodward MD   oxybutynin (DITROPAN-XL) 10 MG extended release tablet TAKE 1 TABLET BY MOUTH EVERY DAY  Cindy Woodward MD   sulfaSALAzine (AZULFIDINE) 500 MG EC tablet Take 1,000 mg by mouth 2 times daily  Historical Provider, MD   atenolol (TENORMIN) 25 MG tablet Take 25 mg by mouth 2 times daily  Historical Provider, MD   sucralfate (CARAFATE) 1 GM tablet 1 g nightly  Historical Provider, MD   vitamin D (CHOLECALCIFEROL) 1000 UNIT TABS tablet Take 4,000 Units by mouth daily  Historical Provider, MD   Multiple Vitamins-Minerals (THERAPEUTIC MULTIVITAMIN-MINERALS) tablet Take 1 tablet by mouth daily  Historical Provider, MD   Omega-3 Fatty Acids (FISH OIL) 1000 MG CAPS Take 1,000 mg by mouth 3 times daily  Historical Provider, MD   pantoprazole (PROTONIX) 40 MG tablet Take 1 tablet by mouth 2 times daily (before meals)  Patient taking differently: Take 40 mg by mouth daily   Higinio Staples MD   leflunomide (ARAVA) 20 MG tablet Take 20 mg by mouth daily  Historical Provider, MD   aspirin 81 MG tablet Take 81 mg by mouth daily  Historical Provider, MD     Allergies Allergen Reactions    Adhesive Tape      Blisters     Amlodipine Hives       OBJECTIVE:  Estimated body mass index is 30.36 kg/m² as calculated from the following:    Height as of this encounter: 5' 2\" (1.575 m). Weight as of 6/16/20: 166 lb (75.3 kg). Vitals:    06/21/21 1546   BP: 122/60   Site: Left Upper Arm   Position: Sitting   Cuff Size: Medium Adult   Pulse: 67   SpO2: 90%   Height: 5' 2\" (1.575 m)       Physical Exam  Vitals and nursing note reviewed. Constitutional:       General: She is not in acute distress. Appearance: She is well-developed. She is not diaphoretic. HENT:      Head: Normocephalic and atraumatic. Right Ear: External ear normal.      Left Ear: External ear normal.      Nose: Nose normal.   Eyes:      General: Lids are normal. No scleral icterus. Right eye: No discharge. Left eye: No discharge. Pupils: Pupils are equal, round, and reactive to light. Neck:      Thyroid: No thyromegaly. Vascular: No JVD. Cardiovascular:      Rate and Rhythm: Normal rate and regular rhythm. Heart sounds: Normal heart sounds. Pulmonary:      Effort: Pulmonary effort is normal. No respiratory distress. Breath sounds: Normal breath sounds. Abdominal:      Palpations: Abdomen is soft. There is no hepatomegaly or splenomegaly. Tenderness: There is no abdominal tenderness. Musculoskeletal:      Right lower leg: Edema (1+) present. Left lower leg: Edema (trace) present. Skin:     General: Skin is warm and dry. Coloration: Skin is not pale. Findings: No erythema or rash. Comments: Turgor normal   Neurological:      Mental Status: She is oriented to person, place, and time. Psychiatric:         Mood and Affect: Mood normal.         Behavior: Behavior normal.         Thought Content: Thought content normal.         Judgment: Judgment normal.              ASSESSMENT PLAN      Diagnosis Orders   1.  Essential hypertension COMPREHENSIVE METABOLIC PANEL   2. Mixed hyperlipidemia  LIPID PANEL   3. Vitamin D deficiency  VITAMIN D 25 HYDROXY   4. Rheumatoid arthritis involving multiple sites with positive rheumatoid factor ([address]     5. DVT of deep femoral vein, left (HCC)     6. Acquired hypothyroidism     7. Gastroesophageal reflux disease with esophagitis without hemorrhage     8. Coronary artery disease due to lipid rich plaque     9. Primary osteoarthritis involving multiple joints     10. Anemia of chronic disease     11. Corn of foot     12. Ventral hernia without obstruction or gangrene     Blood pressure acceptable. Continue lipid monitoring as needed. Vitamin D levels will be monitored as needed still followed by the rheumatologist who is been prescribing Ambien I asked him to contact him for refill. Past history DVT. Thyroid replacement by symptoms appears appropriate. Reflux controlled. No symptoms of coronary insufficiency. Has a corn on the foot discussed local treatment. Someone else has been following the anemia although would consider a CBC in the future. She was in the hospital after the first the year for bradycardia but the same time as had some ectopy so the cardiologist has increased her metoprolol, she is actually taking extended release metoprolol in the morning and immediate release after supper and that seems to be controlling her. Allow cardiology to continue to prescribe. Follow-up 6 months. Scribe attestation: Jonathan Norman RN, am scribing for and in the presence of Adonay Hunter MD. Electronically signed by Karoline Mcdaniel RN on 6/21/2021 at 3:54 PM      Provider attestation:     I, Dr. Malcolm Pabon, personally performed the services described in this documentation, as scribed by the above signed scribe in my presence, and it is both accurate and complete.  I agree with the ROS and Past Histories independently gathered by the clinical support staff and the remaining scribed note accurately describes my personal service to the patient.       6/21/2021    7:01 PM

## 2021-06-21 NOTE — PATIENT INSTRUCTIONS

## 2021-06-22 RX ORDER — LEVOTHYROXINE SODIUM 0.15 MG/1
TABLET ORAL
Qty: 90 TABLET | Refills: 0 | Status: SHIPPED | OUTPATIENT
Start: 2021-06-22 | End: 2021-09-27

## 2021-06-22 NOTE — TELEPHONE ENCOUNTER
Last visit was 6/21/21  Future Appointments   Date Time Provider Victor M Aguiar   12/3/2021  2:00 PM Don Tejada MD [address]

## 2021-08-27 ENCOUNTER — PATIENT MESSAGE (OUTPATIENT)
Dept: FAMILY MEDICINE CLINIC | Age: 86
End: 2021-08-27

## 2021-08-27 DIAGNOSIS — K04.7 DENTAL INFECTION: Primary | ICD-10-CM

## 2021-08-27 RX ORDER — AMOXICILLIN 500 MG/1
CAPSULE ORAL
Qty: 5 CAPSULE | Refills: 0 | Status: SHIPPED | OUTPATIENT
Start: 2021-08-27 | End: 2022-06-01 | Stop reason: SDUPTHER

## 2021-08-27 NOTE — TELEPHONE ENCOUNTER
From: Crow Salmeron  To: Caryl Sun MD  Sent: 8/27/2021 2:36 PM EDT  Subject: Visit Follow-Up Question    Gene and I have pending lab work to be done after our last visit and were supposed to make an appointment to come to the office to have blood drawn. Please find one or more time options for us to come. Mid-afternoon times are best. Also, I have 2 pending appointments for dental work in early Sept. Because of my multiple prosthetic implants, I need to take pre-op antibiotics. We have routinely taken 2 gm of Amoxicillin (four 500 mgm caps) just before the work is done and 500 mgm Amoxicillin immediately after. I would appreciate you ordering from [facility] ([phone]) Amoxicillin to cover my 2 dental igor'ts. Thank you,  Christopher Anne

## 2021-09-02 ENCOUNTER — HOSPITAL ENCOUNTER (OUTPATIENT)
Age: 86
Discharge: HOME | End: 2021-09-02
Payer: MEDICARE

## 2021-09-02 DIAGNOSIS — E55.9 VITAMIN D DEFICIENCY: ICD-10-CM

## 2021-09-02 DIAGNOSIS — N32.81 OVERACTIVE BLADDER: ICD-10-CM

## 2021-09-02 LAB
ALBUMIN SERPL-MCNC: 3.7 G/DL (ref 3.4–5)
ALP BLD-CCNC: 95 U/L (ref 40–129)
ALT SERPL-CCNC: 17 U/L (ref 10–40)
ANION GAP SERPL CALCULATED.3IONS-SCNC: 9 MMOL/L (ref 3–16)
AST SERPL-CCNC: 25 U/L (ref 15–37)
BASOPHILS ABSOLUTE: 0.1 K/UL (ref 0–0.2)
BASOPHILS RELATIVE PERCENT: 1.1 %
BILIRUB SERPL-MCNC: 0.5 MG/DL (ref 0–1)
BILIRUBIN DIRECT: <0.2 MG/DL (ref 0–0.3)
BILIRUBIN, INDIRECT: ABNORMAL MG/DL (ref 0–1)
BUN BLDV-MCNC: 15 MG/DL (ref 7–20)
CALCIUM SERPL-MCNC: 9.5 MG/DL (ref 8.3–10.6)
CHLORIDE BLD-SCNC: 102 MMOL/L (ref 99–110)
CO2: 29 MMOL/L (ref 21–32)
CREAT SERPL-MCNC: <0.5 MG/DL (ref 0.6–1.2)
EOSINOPHILS ABSOLUTE: 0.4 K/UL (ref 0–0.6)
EOSINOPHILS RELATIVE PERCENT: 7.6 %
GFR AFRICAN AMERICAN: >60
GFR NON-AFRICAN AMERICAN: >60
GLUCOSE BLD-MCNC: 101 MG/DL (ref 70–99)
HCT VFR BLD CALC: 34.3 % (ref 36–48)
HEMOGLOBIN: 11.5 G/DL (ref 12–16)
LYMPHOCYTES ABSOLUTE: 0.9 K/UL (ref 1–5.1)
LYMPHOCYTES RELATIVE PERCENT: 16.6 %
MCH RBC QN AUTO: 34.8 PG (ref 26–34)
MCHC RBC AUTO-ENTMCNC: 33.4 G/DL (ref 31–36)
MCV RBC AUTO: 104.1 FL (ref 80–100)
MONOCYTES ABSOLUTE: 0.6 K/UL (ref 0–1.3)
MONOCYTES RELATIVE PERCENT: 11.7 %
NEUTROPHILS ABSOLUTE: 3.5 K/UL (ref 1.7–7.7)
NEUTROPHILS RELATIVE PERCENT: 63 %
PDW BLD-RTO: 13.9 % (ref 12.4–15.4)
PLATELET # BLD: 185 K/UL (ref 135–450)
PMV BLD AUTO: 7.4 FL (ref 5–10.5)
POTASSIUM SERPL-SCNC: 4.5 MMOL/L (ref 3.5–5.1)
RBC # BLD: 3.3 M/UL (ref 4–5.2)
SODIUM BLD-SCNC: 140 MMOL/L (ref 136–145)
TOTAL PROTEIN: 6.2 G/DL (ref 6.4–8.2)
WBC # BLD: 5.5 K/UL (ref 4–11)

## 2021-09-02 PROCEDURE — 80048 BASIC METABOLIC PNL TOTAL CA: CPT

## 2021-09-02 PROCEDURE — 36415 COLL VENOUS BLD VENIPUNCTURE: CPT

## 2021-09-02 PROCEDURE — 82306 VITAMIN D 25 HYDROXY: CPT

## 2021-09-02 PROCEDURE — 85025 COMPLETE CBC W/AUTO DIFF WBC: CPT

## 2021-09-02 PROCEDURE — 80076 HEPATIC FUNCTION PANEL: CPT

## 2021-09-02 RX ORDER — OXYBUTYNIN CHLORIDE 10 MG/1
TABLET, EXTENDED RELEASE ORAL
Qty: 90 TABLET | Refills: 3 | Status: SHIPPED | OUTPATIENT
Start: 2021-09-02 | End: 2022-03-01

## 2021-09-03 LAB — VITAMIN D 25-HYDROXY: 95.9 NG/ML

## 2021-09-07 RX ORDER — APIXABAN 5 MG/1
TABLET, FILM COATED ORAL
Qty: 60 TABLET | Refills: 11 | Status: SHIPPED | OUTPATIENT
Start: 2021-09-07 | End: 2022-09-08

## 2021-09-22 DIAGNOSIS — I10 ESSENTIAL HYPERTENSION: ICD-10-CM

## 2021-09-22 RX ORDER — LOSARTAN POTASSIUM 50 MG/1
TABLET ORAL
Qty: 135 TABLET | Refills: 1 | Status: SHIPPED | OUTPATIENT
Start: 2021-09-22 | End: 2022-01-25

## 2021-09-27 RX ORDER — MAGNESIUM OXIDE 400 MG/1
TABLET ORAL
Qty: 90 TABLET | Refills: 1 | Status: SHIPPED | OUTPATIENT
Start: 2021-09-27 | End: 2022-01-25

## 2021-10-26 ENCOUNTER — PATIENT MESSAGE (OUTPATIENT)
Dept: FAMILY MEDICINE CLINIC | Age: 86
End: 2021-10-26

## 2021-10-26 RX ORDER — POTASSIUM CHLORIDE 20 MEQ/1
20 TABLET, EXTENDED RELEASE ORAL DAILY
Qty: 30 TABLET | Refills: 5 | Status: SHIPPED | OUTPATIENT
Start: 2021-10-26 | End: 2022-01-26 | Stop reason: SDUPTHER

## 2021-10-26 NOTE — TELEPHONE ENCOUNTER
From: Tianna Herrera  To: Tyson Carter MD  Sent: 10/26/2021 4:35 PM EDT  Subject: Prescription Question    I need the following 2 prescriptions sent to [facility] ([phone]): (1) Fluticasone 50 mcg spray w directions to instill 2 sprays into each nostril qd and (2) Klor-Con M20 tabs w directions to take tab 1 bid.     Thank you,  Tianna Herrera

## 2021-11-22 ENCOUNTER — TELEPHONE (OUTPATIENT)
Dept: FAMILY MEDICINE CLINIC | Age: 86
End: 2021-11-22

## 2021-11-22 ENCOUNTER — VIRTUAL VISIT (OUTPATIENT)
Dept: FAMILY MEDICINE CLINIC | Age: 86
End: 2021-11-22
Payer: MEDICARE

## 2021-11-22 DIAGNOSIS — Z00.00 ROUTINE GENERAL MEDICAL EXAMINATION AT A HEALTH CARE FACILITY: Primary | ICD-10-CM

## 2021-11-22 DIAGNOSIS — R29.818 SUSPECTED SLEEP APNEA: Primary | ICD-10-CM

## 2021-11-22 PROCEDURE — G0439 PPPS, SUBSEQ VISIT: HCPCS | Performed by: FAMILY MEDICINE

## 2021-11-22 ASSESSMENT — PATIENT HEALTH QUESTIONNAIRE - PHQ9
SUM OF ALL RESPONSES TO PHQ QUESTIONS 1-9: 0
SUM OF ALL RESPONSES TO PHQ QUESTIONS 1-9: 0
2. FEELING DOWN, DEPRESSED OR HOPELESS: 0
SUM OF ALL RESPONSES TO PHQ QUESTIONS 1-9: 0
SUM OF ALL RESPONSES TO PHQ9 QUESTIONS 1 & 2: 0
1. LITTLE INTEREST OR PLEASURE IN DOING THINGS: 0

## 2021-11-22 ASSESSMENT — LIFESTYLE VARIABLES: HOW OFTEN DO YOU HAVE A DRINK CONTAINING ALCOHOL: 0

## 2021-11-22 NOTE — PATIENT INSTRUCTIONS
Personalized Preventive Plan for Zack Cortez - 11/22/2021  Medicare offers a range of preventive health benefits. Some of the tests and screenings are paid in full while other may be subject to a deductible, co-insurance, and/or copay. Some of these benefits include a comprehensive review of your medical history including lifestyle, illnesses that may run in your family, and various assessments and screenings as appropriate. After reviewing your medical record and screening and assessments performed today your provider may have ordered immunizations, labs, imaging, and/or referrals for you. A list of these orders (if applicable) as well as your Preventive Care list are included within your After Visit Summary for your review. Other Preventive Recommendations:    · A preventive eye exam performed by an eye specialist is recommended every 1-2 years to screen for glaucoma; cataracts, macular degeneration, and other eye disorders. · A preventive dental visit is recommended every 6 months. · Try to get at least 150 minutes of exercise per week or 10,000 steps per day on a pedometer . · Order or download the FREE \"Exercise & Physical Activity: Your Everyday Guide\" from [employer]. Call 3-965.183.2501 or search [employer] online. · You need 8222-5880 mg of calcium and 2392-8712 IU of vitamin D per day. It is possible to meet your calcium requirement with diet alone, but a vitamin D supplement is usually necessary to meet this goal.  · When exposed to the sun, use a sunscreen that protects against both UVA and UVB radiation with an SPF of 30 or greater. Reapply every 2 to 3 hours or after sweating, drying off with a towel, or swimming. · Always wear a seat belt when traveling in a car. Always wear a helmet when riding a bicycle or motorcycle.

## 2021-11-22 NOTE — PROGRESS NOTES
Medicare Annual Wellness Visit  Name: Steven Soares Date: 2021   MRN: <[id number]> Sex: Female   Age: 80 y.o. Ethnicity: Non- / Non    : 1927 Race: White (non-)      Carlos Payne is here for Medicare AWV    Screenings for behavioral, psychosocial and functional/safety risks, and cognitive dysfunction are all negative except as indicated below. These results, as well as other patient data from the [address] form, are documented in Flowsheets linked to this Encounter. Allergies   Allergen Reactions    Adhesive Tape      Blisters     Amlodipine Hives         Prior to Visit Medications    Medication Sig Taking? Authorizing Provider   potassium chloride (KLOR-CON M) 20 MEQ extended release tablet Take 1 tablet by mouth daily  Rufus Gonzalez MD   levothyroxine (SYNTHROID) 150 MCG tablet TAKE 1 TABLET BY MOUTH EVERY DAY  Rufus Gonzalez MD   magnesium oxide (MAG-OX) 400 MG tablet TAKE 1 TABLET BY MOUTH EVERY DAY  Rufus Gonzalez MD   losartan (COZAAR) 50 MG tablet TAKE 0.5 TABLET BY MOUTH IN THE MORNING AND ONE TABLET IN THE EVENING. Rufus Gonzalez MD   ELIQUIS 5 MG TABS tablet TAKE 1 TABLET BY MOUTH TWICE A DAY  Rufus Gonzalez MD   oxybutynin (DITROPAN-XL) 10 MG extended release tablet TAKE 1 TABLET BY MOUTH EVERY DAY  Rufus Gonzalez MD   amoxicillin (AMOXIL) 500 MG capsule Take four tablets by mouth the day prior to dental procedure and then one tablet after dental procedure.   Rufus Gonzalez MD   metoprolol succinate (TOPROL XL) 25 MG extended release tablet Take 0.5 tablets by mouth daily  Patient taking differently: Take 25 mg by mouth daily   Rufus Gonzalez MD   sulfaSALAzine (AZULFIDINE) 500 MG EC tablet Take 1,000 mg by mouth 2 times daily  Historical Provider, MD   sucralfate (CARAFATE) 1 GM tablet 1 g nightly  Historical Provider, MD   vitamin D (CHOLECALCIFEROL) 1000 UNIT TABS tablet Take 4,000 Units by mouth daily  Historical Provider, MD   Multiple Vitamins-Minerals (THERAPEUTIC MULTIVITAMIN-MINERALS) tablet Take 1 tablet by mouth daily  Historical Provider, MD   Omega-3 Fatty Acids (FISH OIL) 1000 MG CAPS Take 1,000 mg by mouth 3 times daily  Historical Provider, MD   pantoprazole (PROTONIX) 40 MG tablet Take 1 tablet by mouth 2 times daily (before meals)  Patient taking differently: Take 40 mg by mouth daily   Amy Coe MD   leflunomide (ARAVA) 20 MG tablet Take 20 mg by mouth daily  Historical Provider, MD   aspirin 81 MG tablet Take 81 mg by mouth daily  Historical Provider, MD         Past Medical History:   Diagnosis Date    Arthritis     Arthritis     possibly Rheumatoid, Dr. Robles Cea    CAD (coronary artery disease)     GERD (gastroesophageal reflux disease)     Hyperlipidemia     Hypertension     Thyroid disease        Past Surgical History:   Procedure Laterality Date    CHOLECYSTECTOMY      CORONARY ANGIOPLASTY WITH STENT PLACEMENT      JOINT REPLACEMENT      \"knees and hips\"    OK ESOPHAGOGASTRODUODENOSCOPY TRANSORAL DIAGNOSTIC N/A 11/7/2018    EGD ESOPHAGOGASTRODUODENOSCOPY performed by Hector Belcher MD at [address]       History reviewed. No pertinent family history. CareTeam (Including outside providers/suppliers regularly involved in providing care):   Patient Care Team:  Celso Holland MD as PCP - General (Family Medicine)  Celso Holland MD as PCP - [facility] Empaneled Provider    Wt Readings from Last 3 Encounters:   06/16/20 166 lb (75.3 kg)   01/13/20 167 lb 6.4 oz (75.9 kg)   05/27/19 160 lb (72.6 kg)     Patient reported vitals as follows:  /60  Pulse 70  Weight 160 lbs    Based upon direct observation of the patient, evaluation of cognition reveals recent and remote memory intact. Patient's complete Health Risk Assessment and screening values have been reviewed and are found in Flowsheets.  The following problems were reviewed today and where indicated follow up appointments were made and/or referrals ordered. Positive Risk Factor Screenings with Interventions:            General Health and ACP:  General  In general, how would you say your health is?: Fair  In the past 7 days, have you experienced any of the following?  New or Increased Pain, New or Increased Fatigue, Loneliness, Social Isolation, Stress or Anger?: None of These  Do you get the social and emotional support that you need?: Yes  Do you have a Living Will?: (!) No  Advance Directives     Power of  Living Will ACP-Advance Directive ACP-Power of     Not on File Not on File Not on File Not on File      General Health Risk Interventions:  · No Living Will: Advance Care Planning addressed with patient today    Health Habits/Nutrition:  Health Habits/Nutrition  Do you exercise for at least 20 minutes 2-3 times per week?: Yes  Have you lost any weight without trying in the past 3 months?: No  Do you eat only one meal per day?: No  Have you seen the dentist within the past year?: Yes     Health Habits/Nutrition Interventions:  · no interventions at this time       Personalized Preventive Plan   Current Health Maintenance Status  Immunization History   Administered Date(s) Administered    Influenza Vaccine, unspecified formulation 10/28/2015    Influenza Virus Vaccine 11/29/2012, 10/24/2013, 10/28/2015, 09/01/2016    Influenza Whole 10/14/2017    Influenza, Quadv, IM, PF (6 mo and older Fluzone, Flulaval, Fluarix, and 3 yrs and older Afluria) 09/17/2019, 10/08/2020    Pneumococcal Conjugate 13-valent (Aktpqef12) 04/06/2015    Pneumococcal Polysaccharide ([id number]) 11/03/2009    Td, unspecified formulation 11/01/2006    Zoster Live (Zostavax) 01/01/2005        Health Maintenance   Topic Date Due    COVID-19 Vaccine (1) Never done    Shingles Vaccine (2 of 3) 02/26/2005    DTaP/Tdap/Td vaccine (1 - Tdap) 11/02/2006    Annual Wellness Visit (AWV)  Never done    TSH testing  05/19/2022    Potassium monitoring  09/02/2022    Creatinine monitoring  09/02/2022    Flu vaccine  Completed    Pneumococcal 65+ years Vaccine  Completed    Hepatitis A vaccine  Aged Out    Hepatitis B vaccine  Aged Out    Hib vaccine  Aged Out    Meningococcal (ACWY) vaccine  Aged Out     Recommendations for [employer] Due: see orders and patient instructions/AVS.  . Recommended screening schedule for the next 5-10 years is provided to the patient in written form: see Patient Instructions/AVS.    Meghan Ortega LPN, 23/44/4063, performed the documented evaluation under the direct supervision of the attending physician. Marina Terrell, was evaluated through a synchronous (real-time) telephone encounter. The patient (or guardian if applicable) is aware that this is a billable service. Verbal consent to proceed has been obtained within the past 12 months. The visit was conducted pursuant to the emergency declaration under the [address] [place] [address], [address] [place] [address] authority and the [employer] and [employer] Act. Patient identification was verified, and a caregiver was present when appropriate. The patient was located in a state where the provider was credentialed to provide care. --Mone Ivy LPN on 87/41/8299 at 3:52 PM    An electronic signature was used to authenticate this note. This encounter was performed under Alta scherer MDs, direct supervision, 11/22/2021.

## 2021-12-03 ENCOUNTER — OFFICE VISIT (OUTPATIENT)
Dept: FAMILY MEDICINE CLINIC | Age: 86
End: 2021-12-03
Payer: MEDICARE

## 2021-12-03 VITALS
SYSTOLIC BLOOD PRESSURE: 146 MMHG | BODY MASS INDEX: 29.37 KG/M2 | DIASTOLIC BLOOD PRESSURE: 77 MMHG | HEART RATE: 68 BPM | WEIGHT: 159.6 LBS | HEIGHT: 62 IN | OXYGEN SATURATION: 98 %

## 2021-12-03 DIAGNOSIS — I25.83 CORONARY ARTERY DISEASE DUE TO LIPID RICH PLAQUE: ICD-10-CM

## 2021-12-03 DIAGNOSIS — E03.9 ACQUIRED HYPOTHYROIDISM: ICD-10-CM

## 2021-12-03 DIAGNOSIS — G47.33 OBSTRUCTIVE SLEEP APNEA: ICD-10-CM

## 2021-12-03 DIAGNOSIS — I25.10 CORONARY ARTERY DISEASE DUE TO LIPID RICH PLAQUE: ICD-10-CM

## 2021-12-03 DIAGNOSIS — E78.2 MIXED HYPERLIPIDEMIA: ICD-10-CM

## 2021-12-03 DIAGNOSIS — I87.2 VENOUS INSUFFICIENCY OF BOTH LOWER EXTREMITIES: ICD-10-CM

## 2021-12-03 DIAGNOSIS — I10 PRIMARY HYPERTENSION: ICD-10-CM

## 2021-12-03 DIAGNOSIS — K21.00 GASTROESOPHAGEAL REFLUX DISEASE WITH ESOPHAGITIS WITHOUT HEMORRHAGE: Primary | ICD-10-CM

## 2021-12-03 DIAGNOSIS — L84 CORN OF FOOT: ICD-10-CM

## 2021-12-03 DIAGNOSIS — M05.79 RHEUMATOID ARTHRITIS INVOLVING MULTIPLE SITES WITH POSITIVE RHEUMATOID FACTOR (HCC): ICD-10-CM

## 2021-12-03 PROCEDURE — 99214 OFFICE O/P EST MOD 30 MIN: CPT | Performed by: FAMILY MEDICINE

## 2021-12-03 NOTE — PROGRESS NOTES
Chief Complaint   Patient presents with    Gastroesophageal Reflux    Hypertension     pt takes BP at home between /60s        Internal Administration   First Dose      Second Dose           Last COVID Lab No results found for: SARS-COV-2, SARS-COV-2 RNA, SARS-COV-2, SARS-COV-2, SARS-COV-2 BY PCR, SARS-COV-2, SARS-COV-2, SARS-COV-2          Wt Readings from Last 3 Encounters:   21 159 lb 9.6 oz (72.4 kg)   20 166 lb (75.3 kg)   20 167 lb 6.4 oz (75.9 kg)     BP Readings from Last 3 Encounters:   21 (!) 146/77   21 122/60   20 (!) 141/60      No results found for: LABA1C    HPI:  Trevor Howell is a 80 y.o. (: 1927) here today for  Came in for 6-month checkup. Still having leg swelling but the compression stockings do help. The corn pads helped her foot. Reflux is doing fine. She is continues to see cardiology. Denying chest pain or trouble breathing. She has sleep apnea. Has a CPAP machine that is 6years old. She is concerned that is going to break down. She has not had a sleep study to update settings. She reached out to the previous pulmonologist who needed a new referral.    [] Patient has completed an advance directive  [] Patient has NOT completed an advanced directive  [] Patient has a documented healthcare surrogate  [] Patient does NOT have a documented healthcare surrogate  [] Discussed the importance of establishing and updating an advanced directive. Patient has questions at this time and those were answered. [] Discussed the importance of establishing and updating an advanced directive. Patient does NOT have questions at this time.     Discussed with: [] Patient            [] Family             [] Other caregiver    Patient's medications, allergies, past medical, surgical, social and family histories were reviewed and updated asappropriate on 12/3/2021 at 3:19 PM.    ROS:  Review of Systems    All other systems reviewed and are negative except as noted above on 12/3/2021 at 3:19 PM. Additional review of systems may be scanned into the media section ofthis medical record. Any responses requiring further intervention were pursued. Past Medical History:   Diagnosis Date    Arthritis     Arthritis     possibly Rheumatoid, Dr. Thania Almotne CAD (coronary artery disease)     GERD (gastroesophageal reflux disease)     Hyperlipidemia     Hypertension     Thyroid disease      History reviewed. No pertinent family history. Social History     Socioeconomic History    Marital status:      Spouse name: Not on file    Number of children: Not on file    Years of education: Not on file    Highest education level: Not on file   Occupational History    Not on file   Tobacco Use    Smoking status: Never Smoker    Smokeless tobacco: Never Used   Vaping Use    Vaping Use: Never used   Substance and Sexual Activity    Alcohol use: No    Drug use: No    Sexual activity: Not on file   Other Topics Concern    Not on file   Social History Narrative    Not on file     Social Determinants of Health     Financial Resource Strain: Low Risk     Difficulty of Paying Living Expenses: Not hard at all   Food Insecurity: No Food Insecurity    Worried About [address] in the Last Year: Never true    [address] in the Last Year: Never true   Transportation Needs:     Lack of Transportation (Medical): Not on file    Lack of Transportation (Non-Medical):  Not on file   Physical Activity:     Days of Exercise per Week: Not on file    Minutes of Exercise per Session: Not on file   Stress:     Feeling of Stress : Not on file   Social Connections:     Frequency of Communication with Friends and Family: Not on file    Frequency of Social Gatherings with Friends and Family: Not on file    Attends Protestant Services: Not on file    Active Member of Clubs or Organizations: Not on file    Attends Club or Organization Meetings: Not on file   [facility] Marital Status: Not on file   Intimate Partner Violence:     Fear of Current or Ex-Partner: Not on file    Emotionally Abused: Not on file    Physically Abused: Not on file    Sexually Abused: Not on file   Housing Stability:     Unable to Pay for Housing in the Last Year: Not on file    Number of [place] in the Last Year: Not on file    Unstable Housing in the Last Year: Not on file     Prior to Visit Medications    Medication Sig Taking? Authorizing Provider   potassium chloride (KLOR-CON M) 20 MEQ extended release tablet Take 1 tablet by mouth daily  Patient taking differently: Take 20 mEq by mouth daily Takes 1 twice daily Yes Donte Kraus MD   levothyroxine (SYNTHROID) 150 MCG tablet TAKE 1 TABLET BY MOUTH EVERY DAY Yes Donte Kraus MD   magnesium oxide (MAG-OX) 400 MG tablet TAKE 1 TABLET BY MOUTH EVERY DAY Yes Donte Kraus MD   losartan (COZAAR) 50 MG tablet TAKE 0.5 TABLET BY MOUTH IN THE MORNING AND ONE TABLET IN THE EVENING. Patient taking differently: TAKE 1 TABLET BY MOUTH IN THE MORNING AND ONE TABLET IN THE EVENING. Yes Donte Kraus MD   ELIQUIS 5 MG TABS tablet TAKE 1 TABLET BY MOUTH TWICE A DAY Yes Donte Kraus MD   oxybutynin (DITROPAN-XL) 10 MG extended release tablet TAKE 1 TABLET BY MOUTH EVERY DAY Yes Donte Kraus MD   amoxicillin (AMOXIL) 500 MG capsule Take four tablets by mouth the day prior to dental procedure and then one tablet after dental procedure.  Yes Donte Kraus MD   metoprolol succinate (TOPROL XL) 25 MG extended release tablet Take 0.5 tablets by mouth daily  Patient taking differently: Take 25 mg by mouth daily Take 1 twice daily Yes Donte Kraus MD   sulfaSALAzine (AZULFIDINE) 500 MG EC tablet Take 1,000 mg by mouth 2 times daily Yes Historical Provider, MD   sucralfate (CARAFATE) 1 GM tablet 1 g nightly Yes Historical Provider, MD   vitamin D (CHOLECALCIFEROL) 1000 UNIT TABS tablet Take 4,000 Units by mouth daily Yes Historical Provider, MD   Multiple Vitamins-Minerals (THERAPEUTIC MULTIVITAMIN-MINERALS) tablet Take 1 tablet by mouth daily Yes Historical Provider, MD   Omega-3 Fatty Acids (FISH OIL) 1000 MG CAPS Take 1,000 mg by mouth 3 times daily Yes Historical Provider, MD   pantoprazole (PROTONIX) 40 MG tablet Take 1 tablet by mouth 2 times daily (before meals)  Patient taking differently: Take 40 mg by mouth daily  Yes Timbo Padilla MD   leflunomide (ARAVA) 20 MG tablet Take 20 mg by mouth daily Yes Historical Provider, MD   aspirin 81 MG tablet Take 81 mg by mouth daily Yes Historical Provider, MD     Allergies   Allergen Reactions    Adhesive Tape      Blisters     Amlodipine Hives       OBJECTIVE:  Estimated body mass index is 29.19 kg/m² as calculated from the following:    Height as of this encounter: 5' 2\" (1.575 m). Weight as of this encounter: 159 lb 9.6 oz (72.4 kg). Vitals:    12/03/21 1429   BP: (!) 146/77   Site: Right Upper Arm   Position: Sitting   Cuff Size: Medium Adult   Pulse: 68   SpO2: 98%   Weight: 159 lb 9.6 oz (72.4 kg)   Height: 5' 2\" (1.575 m)       Physical Exam  Vitals and nursing note reviewed. Constitutional:       General: She is not in acute distress. Appearance: She is well-developed. She is not diaphoretic. HENT:      Head: Normocephalic and atraumatic. Right Ear: External ear normal.      Left Ear: External ear normal.      Nose: Nose normal.   Eyes:      General: Lids are normal. No scleral icterus. Right eye: No discharge. Left eye: No discharge. Pupils: Pupils are equal, round, and reactive to light. Neck:      Thyroid: No thyromegaly. Vascular: No JVD. Cardiovascular:      Rate and Rhythm: Normal rate. Rhythm irregular. Pulmonary:      Effort: Pulmonary effort is normal. No respiratory distress. Abdominal:      Palpations: Abdomen is soft.  There is no hepatomegaly or splenomegaly. Tenderness: There is no abdominal tenderness. Musculoskeletal:      Comments: Kyphotic thoracic spine. Seated in wheelchair. Skin:     General: Skin is warm and dry. Coloration: Skin is not pale. Findings: No erythema or rash. Comments: Turgor normal   Psychiatric:         Behavior: Behavior normal.         Thought Content: Thought content normal.         Judgment: Judgment normal.              ASSESSMENT PLAN      Diagnosis Orders   1. Gastroesophageal reflux disease with esophagitis without hemorrhage     2. Acquired hypothyroidism     3. Rheumatoid arthritis involving multiple sites with positive rheumatoid factor ([facility] [address]     4. Corn of foot     5. Primary hypertension     6. Mixed hyperlipidemia     7. Coronary artery disease due to lipid rich plaque     8. Venous insufficiency of both lower extremities     9. Obstructive sleep apnea  External Referral To Sleep Medicine   Reflux controlled. Thyroid replacement by symptoms appears appropriate. Current blood pressure readings in the office or at home are acceptable and no change in medication is necessary continue lipid monitoring as needed. Corns on the feet are better. Rheumatoid arthritis is baseline. No symptoms of coronary insufficiency. Refer as requested. Follow-up 6 months. Patient should call the office immediately with new or ongoing signs or symptoms or worsening, or proceed to the emergency room. No changes in past medical history, past surgical history, social history, or family history were noted during the patient encounter unless specifically listed above. All updates of past medical history, past surgical history, social history, or family history were reviewed personally by me during the office visit. All problems listed in the assessment are stable unless noted otherwise. Medication profile reviewed personally by me during the visit.   Medication side effects and possible impairments from medications were discussed as applicable. This document was prepared by a combination of typing and transcription through a voice recognition software.

## 2021-12-07 ENCOUNTER — TELEPHONE (OUTPATIENT)
Dept: FAMILY MEDICINE CLINIC | Age: 86
End: 2021-12-07

## 2022-01-05 ENCOUNTER — TELEPHONE (OUTPATIENT)
Dept: FAMILY MEDICINE CLINIC | Age: 87
End: 2022-01-05

## 2022-01-05 DIAGNOSIS — I10 PRIMARY HYPERTENSION: Primary | ICD-10-CM

## 2022-01-05 NOTE — TELEPHONE ENCOUNTER
Daniel(EC) called stating that pt will be needing to get some blood work done soon. States due to pt's age, immune system, and the viruses going around he's wanting to know if it would be possible to get an order for home health to come to their home to draw her blood.  Call back Daniel [phone]

## 2022-01-06 ENCOUNTER — TELEPHONE (OUTPATIENT)
Dept: FAMILY MEDICINE CLINIC | Age: 87
End: 2022-01-06

## 2022-01-06 ENCOUNTER — HOSPITAL ENCOUNTER (OUTPATIENT)
Age: 87
Setting detail: SPECIMEN
Discharge: HOME | End: 2022-01-06
Payer: MEDICARE

## 2022-01-06 LAB
ALBUMIN SERPL-MCNC: 4 G/DL (ref 3.4–5)
ALP BLD-CCNC: 106 U/L (ref 40–129)
ALT SERPL-CCNC: 17 U/L (ref 10–40)
ANION GAP SERPL CALCULATED.3IONS-SCNC: 10 MMOL/L (ref 3–16)
AST SERPL-CCNC: 28 U/L (ref 15–37)
BASOPHILS ABSOLUTE: 0 K/UL (ref 0–0.2)
BASOPHILS RELATIVE PERCENT: 0.9 %
BILIRUB SERPL-MCNC: 0.6 MG/DL (ref 0–1)
BILIRUBIN DIRECT: <0.2 MG/DL (ref 0–0.3)
BILIRUBIN, INDIRECT: NORMAL MG/DL (ref 0–1)
BUN BLDV-MCNC: 14 MG/DL (ref 7–20)
CALCIUM SERPL-MCNC: 9.8 MG/DL (ref 8.3–10.6)
CHLORIDE BLD-SCNC: 102 MMOL/L (ref 99–110)
CO2: 28 MMOL/L (ref 21–32)
CREAT SERPL-MCNC: <0.5 MG/DL (ref 0.6–1.2)
EOSINOPHILS ABSOLUTE: 0.3 K/UL (ref 0–0.6)
EOSINOPHILS RELATIVE PERCENT: 8.3 %
GFR AFRICAN AMERICAN: >60
GFR NON-AFRICAN AMERICAN: >60
GLUCOSE BLD-MCNC: 131 MG/DL (ref 70–99)
HCT VFR BLD CALC: 36.2 % (ref 36–48)
HEMOGLOBIN: 12 G/DL (ref 12–16)
LYMPHOCYTES ABSOLUTE: 0.8 K/UL (ref 1–5.1)
LYMPHOCYTES RELATIVE PERCENT: 20.3 %
MCH RBC QN AUTO: 35.3 PG (ref 26–34)
MCHC RBC AUTO-ENTMCNC: 33.2 G/DL (ref 31–36)
MCV RBC AUTO: 106.2 FL (ref 80–100)
MONOCYTES ABSOLUTE: 0.3 K/UL (ref 0–1.3)
MONOCYTES RELATIVE PERCENT: 7.6 %
NEUTROPHILS ABSOLUTE: 2.5 K/UL (ref 1.7–7.7)
NEUTROPHILS RELATIVE PERCENT: 62.9 %
PDW BLD-RTO: 13.6 % (ref 12.4–15.4)
PLATELET # BLD: 176 K/UL (ref 135–450)
PMV BLD AUTO: 7.9 FL (ref 5–10.5)
POTASSIUM SERPL-SCNC: 4.3 MMOL/L (ref 3.5–5.1)
RBC # BLD: 3.41 M/UL (ref 4–5.2)
SODIUM BLD-SCNC: 140 MMOL/L (ref 136–145)
TOTAL PROTEIN: 6.5 G/DL (ref 6.4–8.2)
WBC # BLD: 4.1 K/UL (ref 4–11)

## 2022-01-06 PROCEDURE — 85025 COMPLETE CBC W/AUTO DIFF WBC: CPT

## 2022-01-06 PROCEDURE — 80076 HEPATIC FUNCTION PANEL: CPT

## 2022-01-06 PROCEDURE — 36415 COLL VENOUS BLD VENIPUNCTURE: CPT

## 2022-01-06 PROCEDURE — 80048 BASIC METABOLIC PNL TOTAL CA: CPT

## 2022-01-06 NOTE — TELEPHONE ENCOUNTER
Spoke with patient she states that she does not need home health for labs,  Her daughter took care it it for her.

## 2022-01-06 NOTE — TELEPHONE ENCOUNTER
Spoke with Deborah at [place] [address]. Will need an order for skilled nursing. On the order will need to state that she need labs and her Dx.  .    Order placed and faxed to attn:Deborah [phone]

## 2022-01-06 NOTE — TELEPHONE ENCOUNTER
Please follow-up for labs that are due. As far as home care, we could call [facility] home health.   Since Dr. Annita Velázquez is a former medical director there they may do it for him

## 2022-01-25 DIAGNOSIS — I10 ESSENTIAL HYPERTENSION: ICD-10-CM

## 2022-01-25 RX ORDER — LOSARTAN POTASSIUM 50 MG/1
TABLET ORAL
Qty: 135 TABLET | Refills: 1 | Status: SHIPPED | OUTPATIENT
Start: 2022-01-25 | End: 2022-04-04

## 2022-01-25 RX ORDER — MAGNESIUM OXIDE 400 MG/1
TABLET ORAL
Qty: 90 TABLET | Refills: 1 | Status: SHIPPED | OUTPATIENT
Start: 2022-01-25

## 2022-01-25 NOTE — TELEPHONE ENCOUNTER
Refill request for LOSARTAN POTASSIUM 50 MG TAB,  medication.      Name of Pharmacy- [facility]      Last visit - 12-3-2021     Pending visit - 6-7-2022    Last refill - 1-1-2022, 12-      Medication Contract signed -   Last Antoinette Button ran-         Additional Comments

## 2022-01-26 NOTE — TELEPHONE ENCOUNTER
Daniel(EC) called stating that this Rx was increased to 2x daily. Requesting refills with updated dosage.    Next appt 6/7/2022

## 2022-01-27 RX ORDER — POTASSIUM CHLORIDE 20 MEQ/1
20 TABLET, EXTENDED RELEASE ORAL 2 TIMES DAILY
Qty: 180 TABLET | Refills: 2 | Status: SHIPPED | OUTPATIENT
Start: 2022-01-27 | End: 2022-08-02

## 2022-01-28 ENCOUNTER — TELEPHONE (OUTPATIENT)
Dept: FAMILY MEDICINE CLINIC | Age: 87
End: 2022-01-28

## 2022-01-28 NOTE — TELEPHONE ENCOUNTER
Pt called and stated that she needed her nasal spray called in to [facility] in [place].  Never seen any nasal spray active on her med list.

## 2022-01-31 RX ORDER — FLUTICASONE PROPIONATE 50 MCG
1 SPRAY, SUSPENSION (ML) NASAL DAILY
Qty: 32 G | Refills: 1 | Status: SHIPPED | OUTPATIENT
Start: 2022-01-31 | End: 2022-03-25

## 2022-03-01 DIAGNOSIS — R35.0 URINARY FREQUENCY: Primary | ICD-10-CM

## 2022-03-01 RX ORDER — SOLIFENACIN SUCCINATE 5 MG/1
5 TABLET, FILM COATED ORAL DAILY
Qty: 90 TABLET | Refills: 3 | Status: SHIPPED | OUTPATIENT
Start: 2022-03-01

## 2022-03-25 RX ORDER — FLUTICASONE PROPIONATE 50 MCG
SPRAY, SUSPENSION (ML) NASAL
Qty: 1 EACH | Refills: 1 | Status: SHIPPED | OUTPATIENT
Start: 2022-03-25 | End: 2022-06-22

## 2022-04-02 DIAGNOSIS — I10 ESSENTIAL HYPERTENSION: ICD-10-CM

## 2022-04-04 RX ORDER — LOSARTAN POTASSIUM 50 MG/1
TABLET ORAL
Qty: 90 TABLET | Refills: 2 | Status: SHIPPED | OUTPATIENT
Start: 2022-04-04 | End: 2022-10-27

## 2022-04-28 ENCOUNTER — CARE COORDINATION (OUTPATIENT)
Dept: CARE COORDINATION | Age: 87
End: 2022-04-28

## 2022-04-28 ENCOUNTER — CARE COORDINATION (OUTPATIENT)
Dept: CASE MANAGEMENT | Age: 87
End: 2022-04-28

## 2022-04-28 NOTE — CARE COORDINATION
I spoke with Connie Tipton at [facility] who confirmed agency is active with patient. Will notify CTN at this time.

## 2022-04-28 NOTE — CARE COORDINATION
[facility] Transitions Follow Up Call    2022    Patient: Jordi Araya  Patient : 1927   MRN: [id number]  Reason for Admission:  Discharge Date: 19 RARS: No data recorded     Pt now enrolled in [address] Transitions Subsequent and Final Call    Subsequent and Final Calls  Care Transitions Interventions  Other Interventions:            Follow Up  Future Appointments   Date Time Provider Victor M Aguiar   2022  2:00 PM Shanell Avitia MD [address] [place] [address], BSN, RN   [address]/ [address] [facility] Transition Nurse  [phone]

## 2022-06-01 ENCOUNTER — TELEPHONE (OUTPATIENT)
Dept: FAMILY MEDICINE CLINIC | Age: 87
End: 2022-06-01

## 2022-06-01 DIAGNOSIS — K04.7 DENTAL INFECTION: ICD-10-CM

## 2022-06-01 RX ORDER — AMOXICILLIN 500 MG/1
CAPSULE ORAL
Qty: 5 CAPSULE | Refills: 0 | Status: ON HOLD | OUTPATIENT
Start: 2022-06-01 | End: 2022-08-02 | Stop reason: HOSPADM

## 2022-06-01 NOTE — TELEPHONE ENCOUNTER
Felicita(daughter/not on current HIPPA) called stating that pt is supposed to have a dentist appt today and forgot to call sooner. States that pt is usually supposed to take 5 grams of amoxicillin before any dental procedure.  Pt uses [facility]. Call back pt [phone]  Routing to Yecenia Ngo due to PCP out of office

## 2022-06-22 RX ORDER — FLUTICASONE PROPIONATE 50 MCG
SPRAY, SUSPENSION (ML) NASAL
Qty: 32 G | Refills: 3 | Status: SHIPPED | OUTPATIENT
Start: 2022-06-22 | End: 2022-08-11

## 2022-07-20 ENCOUNTER — HOSPITAL ENCOUNTER (OUTPATIENT)
Age: 87
Setting detail: SPECIMEN
Discharge: HOME | End: 2022-07-20
Payer: MEDICARE

## 2022-07-20 LAB
ALBUMIN SERPL-MCNC: 3.9 G/DL (ref 3.4–5)
ALP BLD-CCNC: 95 U/L (ref 40–129)
ALT SERPL-CCNC: 10 U/L (ref 10–40)
ANION GAP SERPL CALCULATED.3IONS-SCNC: 10 MMOL/L (ref 3–16)
AST SERPL-CCNC: 23 U/L (ref 15–37)
BASOPHILS ABSOLUTE: 0.1 K/UL (ref 0–0.2)
BASOPHILS RELATIVE PERCENT: 1.5 %
BILIRUB SERPL-MCNC: 0.3 MG/DL (ref 0–1)
BILIRUBIN DIRECT: <0.2 MG/DL (ref 0–0.3)
BILIRUBIN, INDIRECT: NORMAL MG/DL (ref 0–1)
BUN BLDV-MCNC: 19 MG/DL (ref 7–20)
CALCIUM SERPL-MCNC: 9.5 MG/DL (ref 8.3–10.6)
CHLORIDE BLD-SCNC: 103 MMOL/L (ref 99–110)
CO2: 27 MMOL/L (ref 21–32)
CREAT SERPL-MCNC: <0.5 MG/DL (ref 0.6–1.2)
EOSINOPHILS ABSOLUTE: 0.3 K/UL (ref 0–0.6)
EOSINOPHILS RELATIVE PERCENT: 7 %
GFR AFRICAN AMERICAN: >60
GFR NON-AFRICAN AMERICAN: >60
GLUCOSE BLD-MCNC: 97 MG/DL (ref 70–99)
HCT VFR BLD CALC: 26.2 % (ref 36–48)
HEMOGLOBIN: 8.3 G/DL (ref 12–16)
LYMPHOCYTES ABSOLUTE: 1.2 K/UL (ref 1–5.1)
LYMPHOCYTES RELATIVE PERCENT: 24.4 %
MCH RBC QN AUTO: 28.6 PG (ref 26–34)
MCHC RBC AUTO-ENTMCNC: 31.7 G/DL (ref 31–36)
MCV RBC AUTO: 90 FL (ref 80–100)
MONOCYTES ABSOLUTE: 0.7 K/UL (ref 0–1.3)
MONOCYTES RELATIVE PERCENT: 14.9 %
NEUTROPHILS ABSOLUTE: 2.6 K/UL (ref 1.7–7.7)
NEUTROPHILS RELATIVE PERCENT: 52.2 %
PDW BLD-RTO: 19.3 % (ref 12.4–15.4)
PLATELET # BLD: 241 K/UL (ref 135–450)
PMV BLD AUTO: 7.7 FL (ref 5–10.5)
POTASSIUM SERPL-SCNC: 4.3 MMOL/L (ref 3.5–5.1)
RBC # BLD: 2.91 M/UL (ref 4–5.2)
SODIUM BLD-SCNC: 140 MMOL/L (ref 136–145)
TOTAL PROTEIN: 6.6 G/DL (ref 6.4–8.2)
WBC # BLD: 5 K/UL (ref 4–11)

## 2022-07-20 PROCEDURE — 36415 COLL VENOUS BLD VENIPUNCTURE: CPT

## 2022-07-20 PROCEDURE — 80076 HEPATIC FUNCTION PANEL: CPT

## 2022-07-20 PROCEDURE — 80048 BASIC METABOLIC PNL TOTAL CA: CPT

## 2022-07-20 PROCEDURE — 85025 COMPLETE CBC W/AUTO DIFF WBC: CPT

## 2022-07-26 ENCOUNTER — OFFICE VISIT (OUTPATIENT)
Dept: FAMILY MEDICINE CLINIC | Age: 87
End: 2022-07-26
Payer: MEDICARE

## 2022-07-26 VITALS — HEART RATE: 60 BPM | SYSTOLIC BLOOD PRESSURE: 128 MMHG | DIASTOLIC BLOOD PRESSURE: 60 MMHG | OXYGEN SATURATION: 97 %

## 2022-07-26 DIAGNOSIS — M15.9 PRIMARY OSTEOARTHRITIS INVOLVING MULTIPLE JOINTS: ICD-10-CM

## 2022-07-26 DIAGNOSIS — E55.9 VITAMIN D DEFICIENCY: ICD-10-CM

## 2022-07-26 DIAGNOSIS — I25.10 CORONARY ARTERY DISEASE DUE TO LIPID RICH PLAQUE: ICD-10-CM

## 2022-07-26 DIAGNOSIS — E03.9 ACQUIRED HYPOTHYROIDISM: ICD-10-CM

## 2022-07-26 DIAGNOSIS — E78.2 MIXED HYPERLIPIDEMIA: ICD-10-CM

## 2022-07-26 DIAGNOSIS — I10 PRIMARY HYPERTENSION: ICD-10-CM

## 2022-07-26 DIAGNOSIS — K21.00 GASTROESOPHAGEAL REFLUX DISEASE WITH ESOPHAGITIS WITHOUT HEMORRHAGE: ICD-10-CM

## 2022-07-26 DIAGNOSIS — F51.01 PRIMARY INSOMNIA: ICD-10-CM

## 2022-07-26 DIAGNOSIS — M05.79 RHEUMATOID ARTHRITIS INVOLVING MULTIPLE SITES WITH POSITIVE RHEUMATOID FACTOR (HCC): ICD-10-CM

## 2022-07-26 DIAGNOSIS — I25.83 CORONARY ARTERY DISEASE DUE TO LIPID RICH PLAQUE: ICD-10-CM

## 2022-07-26 DIAGNOSIS — K43.9 VENTRAL HERNIA WITHOUT OBSTRUCTION OR GANGRENE: ICD-10-CM

## 2022-07-26 DIAGNOSIS — I87.2 VENOUS INSUFFICIENCY OF BOTH LOWER EXTREMITIES: ICD-10-CM

## 2022-07-26 DIAGNOSIS — I82.412 DVT OF DEEP FEMORAL VEIN, LEFT (HCC): ICD-10-CM

## 2022-07-26 DIAGNOSIS — D63.8 ANEMIA OF CHRONIC DISEASE: Primary | ICD-10-CM

## 2022-07-26 PROCEDURE — 36415 COLL VENOUS BLD VENIPUNCTURE: CPT | Performed by: FAMILY MEDICINE

## 2022-07-26 PROCEDURE — 99214 OFFICE O/P EST MOD 30 MIN: CPT | Performed by: FAMILY MEDICINE

## 2022-07-26 PROCEDURE — 1123F ACP DISCUSS/DSCN MKR DOCD: CPT | Performed by: FAMILY MEDICINE

## 2022-07-26 RX ORDER — PANTOPRAZOLE SODIUM 40 MG/1
40 TABLET, DELAYED RELEASE ORAL
Qty: 60 TABLET | Refills: 3 | Status: ON HOLD | OUTPATIENT
Start: 2022-07-26 | End: 2022-08-02 | Stop reason: SDUPTHER

## 2022-07-26 RX ORDER — HYDROCODONE BITARTRATE AND ACETAMINOPHEN 5; 325 MG/1; MG/1
1 TABLET ORAL 2 TIMES DAILY PRN
Qty: 60 TABLET | Refills: 0 | Status: SHIPPED | OUTPATIENT
Start: 2022-07-26 | End: 2022-08-25

## 2022-07-26 RX ORDER — ZOLPIDEM TARTRATE 10 MG/1
5 TABLET ORAL NIGHTLY PRN
Qty: 60 TABLET | Refills: 2 | Status: SHIPPED | OUTPATIENT
Start: 2022-07-26 | End: 2022-09-24

## 2022-07-26 NOTE — PROGRESS NOTES
Chief Complaint   Patient presents with    Hypertension    Gastroesophageal Reflux        Internal Administration   First Dose      Second Dose           Last COVID Lab No results found for: SARS-COV-2, SARS-COV-2 RNA, SARS-COV-2, SARS-COV-2, SARS-COV-2 BY PCR, SARS-COV-2, SARS-COV-2, SARS-COV-2          Wt Readings from Last 3 Encounters:   21 159 lb 9.6 oz (72.4 kg)   20 166 lb (75.3 kg)   20 167 lb 6.4 oz (75.9 kg)     BP Readings from Last 3 Encounters:   22 128/60   21 (!) 146/77   21 122/60      No results found for: LABA1C    HPI:  Sreekanth Brannon is a 80 y.o. (: 1927) here today for    Discussed with patient her recent hemoglobin level. She is now taking half a tablet of eliquis daily. She also started an iron supplement. They also doubled her protonix and also stopped the sulfazalazine. [x] Patient has completed an advance directive  [] Patient has NOT completed an advanced directive  [x] Patient has a documented healthcare surrogate  [] Patient does NOT have a documented healthcare surrogate  [] Discussed the importance of establishing and updating an advanced directive. Patient has questions at this time and those were answered. [x] Discussed the importance of establishing and updating an advanced directive. Patient does NOT have questions at this time. Discussed with: [x] Patient            [] Family             [] Other caregiver    Patient's medications, allergies, past medical, surgical, social and family histories were reviewed and updated asappropriate on 2022 at 2:28 PM.    ROS:  Review of Systems    All other systems reviewed and are negative except as noted above on 2022 at 2:28 PM. Additional review of systems may be scanned into the media section ofthis medical record. Any responses requiring further intervention were pursued.     Past Medical History:   Diagnosis Date    Arthritis     Arthritis     possibly Rheumatoid, Dr. Benjamin Connelly CAD (coronary artery disease)     GERD (gastroesophageal reflux disease)     Hyperlipidemia     Hypertension     Thyroid disease      History reviewed. No pertinent family history. Social History     Socioeconomic History    Marital status:      Spouse name: Not on file    Number of children: Not on file    Years of education: Not on file    Highest education level: Not on file   Occupational History    Not on file   Tobacco Use    Smoking status: Never    Smokeless tobacco: Never   Vaping Use    Vaping Use: Never used   Substance and Sexual Activity    Alcohol use: No    Drug use: No    Sexual activity: Not on file   Other Topics Concern    Not on file   Social History Narrative    Not on file     Social Determinants of Health     Financial Resource Strain: Not on file   Food Insecurity: Not on file   Transportation Needs: Not on file   Physical Activity: Not on file   Stress: Not on file   Social Connections: Not on file   Intimate Partner Violence: Not on file   Housing Stability: Not on file     Prior to Visit Medications    Medication Sig Taking? Authorizing Provider   fluticasone (FLONASE) 50 MCG/ACT nasal spray INHALE 1 SPRAY INTO EACH NOSTRIL EVERY DAY Yes Ramandeep Samayoa MD   amoxicillin (AMOXIL) 500 mg capsule Take four tablets by mouth the day prior to dental procedure and then one tablet after dental procedure. Yes RAMSES Cason - CNP   losartan (COZAAR) 50 MG tablet TAKE 1 TABLET BY MOUTH IN THE MORNING AND ONE TABLET IN THE EVENING.  Yes Ramandeep Samayoa MD   solifenacin (VESICARE) 5 MG tablet Take 1 tablet by mouth daily Yes Ramandeep Samayoa MD   potassium chloride (KLOR-CON M) 20 MEQ extended release tablet Take 1 tablet by mouth 2 times daily Yes Ramandeep Samayoa MD   magnesium oxide (MAG-OX) 400 MG tablet TAKE 1 TABLET BY MOUTH EVERY DAY Yes Ramandeep Samayoa MD   levothyroxine (SYNTHROID) 150 MCG tablet TAKE 1 TABLET BY MOUTH EVERY Left eye: No discharge. Pupils: Pupils are equal, round, and reactive to light. Neck:      Thyroid: No thyromegaly. Vascular: No JVD. Cardiovascular:      Rate and Rhythm: Normal rate and regular rhythm. Heart sounds: Normal heart sounds. Pulmonary:      Effort: Pulmonary effort is normal. No respiratory distress. Breath sounds: Normal breath sounds. Abdominal:      Palpations: Abdomen is soft. There is no hepatomegaly or splenomegaly. Tenderness: There is no abdominal tenderness. Musculoskeletal:      Right lower leg: Edema (2+) present. Left lower leg: Edema (2+) present. Comments: Kyphotic thoracic spine. Seated in wheelchair. Brace on right lower extremity   Skin:     General: Skin is warm and dry. Coloration: Skin is not pale. Findings: No erythema or rash. Comments: Turgor normal   Neurological:      Mental Status: She is oriented to person, place, and time. Psychiatric:         Mood and Affect: Mood normal.         Behavior: Behavior normal.         Thought Content: Thought content normal.         Judgment: Judgment normal.            ASSESSMENT PLAN      Diagnosis Orders   1. Anemia of chronic disease  CBC with Auto Differential    CBC with Auto Differential      2. Primary insomnia  zolpidem (AMBIEN) 10 MG tablet      3. Rheumatoid arthritis involving multiple sites with positive rheumatoid factor (HCC)  HYDROcodone-acetaminophen (NORCO) 5-325 MG per tablet      4. DVT of deep femoral vein, left (HCC)        5. Venous insufficiency of both lower extremities        6. Primary hypertension        7. Acquired hypothyroidism        8. Mixed hyperlipidemia        9. Coronary artery disease due to lipid rich plaque        10. Gastroesophageal reflux disease with esophagitis without hemorrhage        11. Primary osteoarthritis involving multiple joints  HYDROcodone-acetaminophen (NORCO) 5-325 MG per tablet      12. Vitamin D deficiency        13. Ventral hernia without obstruction or gangrene        Patient's last hemoglobin was 8.3, down from 10. This was ordered by the rheumatologist.  They are not traveling as much as he used to and I been asked to take over the controlled substances which we will certainly do. PDMP is appropriate would get urine drug screen and sign contract. As far as the anemia interventions have been to stop sulfasalazine which will make her arthritic pain worse, start iron supplements, double the pantoprazole and reduce the Eliquis by half. Rechecking CBC today. Is back home, no falls. There is no clinical evidence of coronary insufficiency or heart failure that requires any change in the treatment regimen and no changes in medications for cardiac conditions as listed in the medication list are necessary. Reflux symptoms based upon patient's history is controlled and no changes in medications for reflux as listed in the medication profile is necessary. Vitamin D levels will be monitored as needed and changes to medications related to vitamin D as listed in the medication list will be changed to maintain parameters as needed. No need for hernia repair. She does not use the Ambien each night. Follow-up 6 months    Addendum August 3, 2022-after this visit patient had rectal bleeding. Was very weak. Hemoglobin at home was 5.5. Referred to [facility] emergency room was admitted. Gastric ulcers not bleeding at the moment were found. Patient received blood transfusion. This morning spouse has informed me that patient has been 24 hours without urine. She is not distended. She is drinking fluids at the same as baseline. We are requesting assessment through home care for skilled nursing as well as PT and OT. Draw a BMP today. Do a straight cath if indicated upon arrival.    Patient should call the office immediately with new or ongoing signs or symptoms or worsening, or proceed to the emergency room.   No changes in past medical history, past surgical history, social history, or family history were noted during the patient encounter unless specifically listed above. All updates of past medical history, past surgical history, social history, or family history were reviewed personally by me during the office visit. All problems listed in the assessment are stable unless noted otherwise. Medication profile reviewed personally by me during the visit. Medication side effects and possible impairments from medications were discussed as applicable. This document was prepared by a combination of typing and transcription through a voice recognition software. Scribe attestation: Andreea Choudhary RN, am scribing for and in the presence of Yanely Pascual MD. Electronically signed by Ana Tena RN on 7/26/2022 at 2:28 PM      Provider attestation:     I, Dr. Jodie Li, personally performed the services described in this documentation, as scribed by the above signed scribe in my presence, and it is both accurate and complete. I agree with the ROS and Past Histories independently gathered by the clinical support staff and the remaining scribed note accurately describes my personal service to the patient.       7/26/2022    2:57 PM

## 2022-07-26 NOTE — PATIENT INSTRUCTIONS
Please bring us in a urine specimen at your next lab appointment for CBC recheck in 2 weeks    Patient should call the office immediately with new or ongoing signs or symptoms or worsening, or proceed to the emergency room. If you are on medications which could impair your senses, you are at risk of weakness, falls, dizziness, or drowsiness. You should be careful during activities which could place you at risk of harm, such as climbing, using stairs, operating machinery, or driving vehicles. If you feel you cannot safely do these activities, you should request others to help you, or avoid the activities altogether. If you are drowsy for any other reason, you should use the same precautions as listed above.      Web Address for Advance Directive:    [email or web address]

## 2022-07-27 DIAGNOSIS — R79.89 ABNORMAL CBC: ICD-10-CM

## 2022-07-27 DIAGNOSIS — D63.8 ANEMIA OF CHRONIC DISEASE: Primary | ICD-10-CM

## 2022-07-27 DIAGNOSIS — D64.9 LOW HEMOGLOBIN AND LOW HEMATOCRIT: ICD-10-CM

## 2022-07-27 DIAGNOSIS — R79.89 ABNORMAL CBC: Primary | ICD-10-CM

## 2022-07-27 LAB
BASOPHILS ABSOLUTE: 0.1 K/UL (ref 0–0.2)
BASOPHILS RELATIVE PERCENT: 1.2 %
EOSINOPHILS ABSOLUTE: 0.5 K/UL (ref 0–0.6)
EOSINOPHILS RELATIVE PERCENT: 8.4 %
HCT VFR BLD CALC: 25.5 % (ref 36–48)
HCT VFR BLD CALC: 26.6 % (ref 36–48)
HEMOGLOBIN: 7.9 G/DL (ref 12–16)
IMMATURE RETIC FRACT: 0.5 (ref 0.21–0.37)
LYMPHOCYTES ABSOLUTE: 1 K/UL (ref 1–5.1)
LYMPHOCYTES RELATIVE PERCENT: 17.8 %
MCH RBC QN AUTO: 28.1 PG (ref 26–34)
MCHC RBC AUTO-ENTMCNC: 31 G/DL (ref 31–36)
MCV RBC AUTO: 90.4 FL (ref 80–100)
MONOCYTES ABSOLUTE: 0.7 K/UL (ref 0–1.3)
MONOCYTES RELATIVE PERCENT: 12.9 %
NEUTROPHILS ABSOLUTE: 3.4 K/UL (ref 1.7–7.7)
NEUTROPHILS RELATIVE PERCENT: 59.7 %
PDW BLD-RTO: 19.7 % (ref 12.4–15.4)
PLATELET # BLD: 218 K/UL (ref 135–450)
PMV BLD AUTO: 7.9 FL (ref 5–10.5)
RBC # BLD: 2.82 M/UL (ref 4–5.2)
RETICULOCYTE ABSOLUTE COUNT: 0.07 M/UL (ref 0.02–0.1)
RETICULOCYTE COUNT PCT: 2.42 % (ref 0.5–2.18)
WBC # BLD: 5.7 K/UL (ref 4–11)

## 2022-07-28 ENCOUNTER — TELEPHONE (OUTPATIENT)
Dept: FAMILY MEDICINE CLINIC | Age: 87
End: 2022-07-28

## 2022-07-28 DIAGNOSIS — D63.8 ANEMIA OF CHRONIC DISEASE: Primary | ICD-10-CM

## 2022-07-28 NOTE — TELEPHONE ENCOUNTER
Informed , he asked if we could place a standing order for her to have CBC checked at the hospital lab weekly to be safe until her appt - order placed

## 2022-07-28 NOTE — TELEPHONE ENCOUNTER
Daniel(EC) called stating that the soonest they could get pt scheduled with hematology was 8/9 @ 1pm. Wanting to know if PCP thinks that soon enough or if pt should get in sooner.  Call back [phone]  Routing to Salma juares to PCP out of office

## 2022-07-28 NOTE — TELEPHONE ENCOUNTER
Will call home care and see if they are able to draw and drop off to the hospital. Zahra Brown is name of home care nurse and her number is [phone] and [place] is [phone]

## 2022-07-29 ENCOUNTER — HOSPITAL ENCOUNTER (INPATIENT)
Age: 87
LOS: 4 days | Discharge: HOSPICE | DRG: 378 | End: 2022-08-02
Attending: EMERGENCY MEDICINE | Admitting: INTERNAL MEDICINE
Payer: MEDICARE

## 2022-07-29 ENCOUNTER — TELEPHONE (OUTPATIENT)
Dept: FAMILY MEDICINE CLINIC | Age: 87
End: 2022-07-29

## 2022-07-29 DIAGNOSIS — K92.2 GASTROINTESTINAL HEMORRHAGE, UNSPECIFIED GASTROINTESTINAL HEMORRHAGE TYPE: ICD-10-CM

## 2022-07-29 DIAGNOSIS — K92.1 MELENA: Primary | ICD-10-CM

## 2022-07-29 DIAGNOSIS — D64.9 ANEMIA, UNSPECIFIED TYPE: ICD-10-CM

## 2022-07-29 PROBLEM — Z86.718 HISTORY OF DVT (DEEP VEIN THROMBOSIS): Status: ACTIVE | Noted: 2022-07-29

## 2022-07-29 PROBLEM — Z99.89 OSA ON CPAP: Status: ACTIVE | Noted: 2022-07-29

## 2022-07-29 PROBLEM — G47.33 OSA ON CPAP: Status: ACTIVE | Noted: 2022-07-29

## 2022-07-29 PROBLEM — D62 ABLA (ACUTE BLOOD LOSS ANEMIA): Status: ACTIVE | Noted: 2022-07-29

## 2022-07-29 PROBLEM — K25.7: Status: ACTIVE | Noted: 2022-07-29

## 2022-07-29 LAB
A/G RATIO: 1.4 (ref 1.1–2.2)
ALBUMIN SERPL-MCNC: 3.9 G/DL (ref 3.4–5)
ALP BLD-CCNC: 98 U/L (ref 40–129)
ALT SERPL-CCNC: 12 U/L (ref 10–40)
ANION GAP SERPL CALCULATED.3IONS-SCNC: 10 MMOL/L (ref 3–16)
APTT: 29.7 SEC (ref 23–34.3)
AST SERPL-CCNC: 23 U/L (ref 15–37)
BASOPHILS ABSOLUTE: 0.1 K/UL (ref 0–0.2)
BASOPHILS ABSOLUTE: 0.1 K/UL (ref 0–0.2)
BASOPHILS RELATIVE PERCENT: 1.6 %
BASOPHILS RELATIVE PERCENT: 1.6 %
BILIRUB SERPL-MCNC: <0.2 MG/DL (ref 0–1)
BUN BLDV-MCNC: 22 MG/DL (ref 7–20)
CALCIUM SERPL-MCNC: 9.7 MG/DL (ref 8.3–10.6)
CHLORIDE BLD-SCNC: 103 MMOL/L (ref 99–110)
CO2: 26 MMOL/L (ref 21–32)
CREAT SERPL-MCNC: 0.7 MG/DL (ref 0.6–1.2)
EOSINOPHILS ABSOLUTE: 0.3 K/UL (ref 0–0.6)
EOSINOPHILS ABSOLUTE: 0.4 K/UL (ref 0–0.6)
EOSINOPHILS RELATIVE PERCENT: 5.5 %
EOSINOPHILS RELATIVE PERCENT: 6.1 %
GFR AFRICAN AMERICAN: >60
GFR NON-AFRICAN AMERICAN: >60
GLUCOSE BLD-MCNC: 110 MG/DL (ref 70–99)
HCT VFR BLD CALC: 23.9 % (ref 36–48)
HCT VFR BLD CALC: 26.4 % (ref 36–48)
HEMOGLOBIN: 7.5 G/DL (ref 12–16)
HEMOGLOBIN: 8.3 G/DL (ref 12–16)
IMMATURE RETIC FRACT: 0.52 (ref 0.21–0.37)
INR BLD: 1.16 (ref 0.87–1.14)
LYMPHOCYTES ABSOLUTE: 0.8 K/UL (ref 1–5.1)
LYMPHOCYTES ABSOLUTE: 1.1 K/UL (ref 1–5.1)
LYMPHOCYTES RELATIVE PERCENT: 15.5 %
LYMPHOCYTES RELATIVE PERCENT: 18 %
MCH RBC QN AUTO: 27.3 PG (ref 26–34)
MCH RBC QN AUTO: 27.5 PG (ref 26–34)
MCHC RBC AUTO-ENTMCNC: 31.2 G/DL (ref 31–36)
MCHC RBC AUTO-ENTMCNC: 31.3 G/DL (ref 31–36)
MCV RBC AUTO: 87.4 FL (ref 80–100)
MCV RBC AUTO: 87.9 FL (ref 80–100)
MONOCYTES ABSOLUTE: 0.6 K/UL (ref 0–1.3)
MONOCYTES ABSOLUTE: 0.8 K/UL (ref 0–1.3)
MONOCYTES RELATIVE PERCENT: 12.8 %
MONOCYTES RELATIVE PERCENT: 12.9 %
NEUTROPHILS ABSOLUTE: 3.2 K/UL (ref 1.7–7.7)
NEUTROPHILS ABSOLUTE: 3.7 K/UL (ref 1.7–7.7)
NEUTROPHILS RELATIVE PERCENT: 61.5 %
NEUTROPHILS RELATIVE PERCENT: 64.5 %
PDW BLD-RTO: 18.7 % (ref 12.4–15.4)
PDW BLD-RTO: 19.2 % (ref 12.4–15.4)
PLATELET # BLD: 214 K/UL (ref 135–450)
PLATELET # BLD: 251 K/UL (ref 135–450)
PMV BLD AUTO: 7.3 FL (ref 5–10.5)
PMV BLD AUTO: 7.5 FL (ref 5–10.5)
POTASSIUM REFLEX MAGNESIUM: 4.3 MMOL/L (ref 3.5–5.1)
PROTHROMBIN TIME: 14.6 SEC (ref 11.7–14.5)
RBC # BLD: 2.73 M/UL (ref 4–5.2)
RBC # BLD: 3 M/UL (ref 4–5.2)
RETICULOCYTE ABSOLUTE COUNT: 0.07 M/UL (ref 0.02–0.1)
RETICULOCYTE COUNT PCT: 2.45 % (ref 0.5–2.18)
SODIUM BLD-SCNC: 139 MMOL/L (ref 136–145)
SPECIMEN STATUS: NORMAL
TOTAL PROTEIN: 6.7 G/DL (ref 6.4–8.2)
WBC # BLD: 4.9 K/UL (ref 4–11)
WBC # BLD: 6.1 K/UL (ref 4–11)

## 2022-07-29 PROCEDURE — G0378 HOSPITAL OBSERVATION PER HR: HCPCS

## 2022-07-29 PROCEDURE — 86901 BLOOD TYPING SEROLOGIC RH(D): CPT

## 2022-07-29 PROCEDURE — 99285 EMERGENCY DEPT VISIT HI MDM: CPT

## 2022-07-29 PROCEDURE — 96366 THER/PROPH/DIAG IV INF ADDON: CPT

## 2022-07-29 PROCEDURE — 6360000002 HC RX W HCPCS: Performed by: INTERNAL MEDICINE

## 2022-07-29 PROCEDURE — 82728 ASSAY OF FERRITIN: CPT

## 2022-07-29 PROCEDURE — 85025 COMPLETE CBC W/AUTO DIFF WBC: CPT

## 2022-07-29 PROCEDURE — 96361 HYDRATE IV INFUSION ADD-ON: CPT

## 2022-07-29 PROCEDURE — 6360000002 HC RX W HCPCS: Performed by: EMERGENCY MEDICINE

## 2022-07-29 PROCEDURE — 85045 AUTOMATED RETICULOCYTE COUNT: CPT

## 2022-07-29 PROCEDURE — C9113 INJ PANTOPRAZOLE SODIUM, VIA: HCPCS | Performed by: NURSE PRACTITIONER

## 2022-07-29 PROCEDURE — 86900 BLOOD TYPING SEROLOGIC ABO: CPT

## 2022-07-29 PROCEDURE — C9113 INJ PANTOPRAZOLE SODIUM, VIA: HCPCS | Performed by: INTERNAL MEDICINE

## 2022-07-29 PROCEDURE — 96375 TX/PRO/DX INJ NEW DRUG ADDON: CPT

## 2022-07-29 PROCEDURE — 80053 COMPREHEN METABOLIC PANEL: CPT

## 2022-07-29 PROCEDURE — 85730 THROMBOPLASTIN TIME PARTIAL: CPT

## 2022-07-29 PROCEDURE — 6360000002 HC RX W HCPCS: Performed by: NURSE PRACTITIONER

## 2022-07-29 PROCEDURE — 6370000000 HC RX 637 (ALT 250 FOR IP): Performed by: NURSE PRACTITIONER

## 2022-07-29 PROCEDURE — 96365 THER/PROPH/DIAG IV INF INIT: CPT

## 2022-07-29 PROCEDURE — 2580000003 HC RX 258: Performed by: INTERNAL MEDICINE

## 2022-07-29 PROCEDURE — 36415 COLL VENOUS BLD VENIPUNCTURE: CPT

## 2022-07-29 PROCEDURE — 86923 COMPATIBILITY TEST ELECTRIC: CPT

## 2022-07-29 PROCEDURE — 85610 PROTHROMBIN TIME: CPT

## 2022-07-29 PROCEDURE — 83540 ASSAY OF IRON: CPT

## 2022-07-29 PROCEDURE — 86850 RBC ANTIBODY SCREEN: CPT

## 2022-07-29 PROCEDURE — 83550 IRON BINDING TEST: CPT

## 2022-07-29 PROCEDURE — 1200000000 HC SEMI PRIVATE

## 2022-07-29 PROCEDURE — 2580000003 HC RX 258: Performed by: NURSE PRACTITIONER

## 2022-07-29 PROCEDURE — 6370000000 HC RX 637 (ALT 250 FOR IP): Performed by: INTERNAL MEDICINE

## 2022-07-29 PROCEDURE — P9016 RBC LEUKOCYTES REDUCED: HCPCS

## 2022-07-29 RX ORDER — HYDROCODONE BITARTRATE AND ACETAMINOPHEN 5; 325 MG/1; MG/1
1 TABLET ORAL 2 TIMES DAILY PRN
Status: DISCONTINUED | OUTPATIENT
Start: 2022-07-29 | End: 2022-08-02 | Stop reason: HOSPADM

## 2022-07-29 RX ORDER — METOPROLOL SUCCINATE 25 MG/1
25 TABLET, EXTENDED RELEASE ORAL 2 TIMES DAILY
Status: DISCONTINUED | OUTPATIENT
Start: 2022-07-30 | End: 2022-07-29

## 2022-07-29 RX ORDER — OXYMETAZOLINE HYDROCHLORIDE 0.05 G/100ML
2 SPRAY NASAL 2 TIMES DAILY PRN
Status: DISPENSED | OUTPATIENT
Start: 2022-07-29 | End: 2022-08-01

## 2022-07-29 RX ORDER — LOSARTAN POTASSIUM 25 MG/1
50 TABLET ORAL 2 TIMES DAILY
Status: DISCONTINUED | OUTPATIENT
Start: 2022-07-29 | End: 2022-08-02 | Stop reason: HOSPADM

## 2022-07-29 RX ORDER — TROSPIUM CHLORIDE 20 MG/1
20 TABLET, FILM COATED ORAL NIGHTLY
Status: DISCONTINUED | OUTPATIENT
Start: 2022-07-29 | End: 2022-08-02 | Stop reason: HOSPADM

## 2022-07-29 RX ORDER — LEFLUNOMIDE 10 MG/1
20 TABLET ORAL DAILY
Status: DISCONTINUED | OUTPATIENT
Start: 2022-07-30 | End: 2022-08-02 | Stop reason: HOSPADM

## 2022-07-29 RX ORDER — LOSARTAN POTASSIUM 25 MG/1
50 TABLET ORAL 2 TIMES DAILY
Status: DISCONTINUED | OUTPATIENT
Start: 2022-07-30 | End: 2022-07-29

## 2022-07-29 RX ORDER — ACETAMINOPHEN 325 MG/1
650 TABLET ORAL EVERY 6 HOURS PRN
Status: DISCONTINUED | OUTPATIENT
Start: 2022-07-29 | End: 2022-08-02 | Stop reason: HOSPADM

## 2022-07-29 RX ORDER — SODIUM CHLORIDE 9 MG/ML
INJECTION, SOLUTION INTRAVENOUS PRN
Status: DISCONTINUED | OUTPATIENT
Start: 2022-07-29 | End: 2022-08-02 | Stop reason: HOSPADM

## 2022-07-29 RX ORDER — SODIUM CHLORIDE 9 MG/ML
INJECTION, SOLUTION INTRAVENOUS CONTINUOUS
Status: DISCONTINUED | OUTPATIENT
Start: 2022-07-29 | End: 2022-07-31

## 2022-07-29 RX ORDER — LANOLIN ALCOHOL/MO/W.PET/CERES
400 CREAM (GRAM) TOPICAL DAILY
Status: DISCONTINUED | OUTPATIENT
Start: 2022-07-30 | End: 2022-08-02 | Stop reason: HOSPADM

## 2022-07-29 RX ORDER — SODIUM CHLORIDE 9 MG/ML
50 INJECTION, SOLUTION INTRAVENOUS ONCE
Status: DISCONTINUED | OUTPATIENT
Start: 2022-07-29 | End: 2022-08-02 | Stop reason: HOSPADM

## 2022-07-29 RX ORDER — ACETAMINOPHEN 650 MG/1
650 SUPPOSITORY RECTAL EVERY 6 HOURS PRN
Status: DISCONTINUED | OUTPATIENT
Start: 2022-07-29 | End: 2022-08-02 | Stop reason: HOSPADM

## 2022-07-29 RX ORDER — SUCRALFATE 1 G/1
1 TABLET ORAL NIGHTLY
Status: DISCONTINUED | OUTPATIENT
Start: 2022-07-29 | End: 2022-07-30

## 2022-07-29 RX ORDER — ONDANSETRON 2 MG/ML
4 INJECTION INTRAMUSCULAR; INTRAVENOUS EVERY 6 HOURS PRN
Status: DISCONTINUED | OUTPATIENT
Start: 2022-07-29 | End: 2022-08-02 | Stop reason: HOSPADM

## 2022-07-29 RX ORDER — SODIUM CHLORIDE 0.9 % (FLUSH) 0.9 %
5-40 SYRINGE (ML) INJECTION PRN
Status: DISCONTINUED | OUTPATIENT
Start: 2022-07-29 | End: 2022-08-02 | Stop reason: HOSPADM

## 2022-07-29 RX ORDER — METOPROLOL SUCCINATE 25 MG/1
25 TABLET, EXTENDED RELEASE ORAL 2 TIMES DAILY
Status: DISCONTINUED | OUTPATIENT
Start: 2022-07-29 | End: 2022-08-02 | Stop reason: HOSPADM

## 2022-07-29 RX ORDER — SODIUM CHLORIDE 0.9 % (FLUSH) 0.9 %
5-40 SYRINGE (ML) INJECTION EVERY 12 HOURS SCHEDULED
Status: DISCONTINUED | OUTPATIENT
Start: 2022-07-29 | End: 2022-08-02 | Stop reason: HOSPADM

## 2022-07-29 RX ORDER — ONDANSETRON 4 MG/1
4 TABLET, ORALLY DISINTEGRATING ORAL EVERY 8 HOURS PRN
Status: DISCONTINUED | OUTPATIENT
Start: 2022-07-29 | End: 2022-08-02 | Stop reason: HOSPADM

## 2022-07-29 RX ORDER — 0.9 % SODIUM CHLORIDE 0.9 %
500 INTRAVENOUS SOLUTION INTRAVENOUS ONCE
Status: COMPLETED | OUTPATIENT
Start: 2022-07-29 | End: 2022-07-29

## 2022-07-29 RX ORDER — FLUTICASONE PROPIONATE 50 MCG
2 SPRAY, SUSPENSION (ML) NASAL NIGHTLY
Status: DISCONTINUED | OUTPATIENT
Start: 2022-07-29 | End: 2022-08-02 | Stop reason: HOSPADM

## 2022-07-29 RX ORDER — FLUTICASONE PROPIONATE 50 MCG
1 SPRAY, SUSPENSION (ML) NASAL DAILY
Status: DISCONTINUED | OUTPATIENT
Start: 2022-07-30 | End: 2022-07-29

## 2022-07-29 RX ORDER — ZOLPIDEM TARTRATE 5 MG/1
5 TABLET ORAL NIGHTLY PRN
Status: DISCONTINUED | OUTPATIENT
Start: 2022-07-29 | End: 2022-08-02 | Stop reason: HOSPADM

## 2022-07-29 RX ORDER — LEVOTHYROXINE SODIUM 0.15 MG/1
150 TABLET ORAL DAILY
Status: DISCONTINUED | OUTPATIENT
Start: 2022-07-30 | End: 2022-08-02 | Stop reason: HOSPADM

## 2022-07-29 RX ADMIN — TROSPIUM CHLORIDE 20 MG: 20 TABLET, FILM COATED ORAL at 21:09

## 2022-07-29 RX ADMIN — SODIUM CHLORIDE 500 ML: 9 INJECTION, SOLUTION INTRAVENOUS at 16:33

## 2022-07-29 RX ADMIN — OXYMETAZOLINE HCL 2 SPRAY: 0.05 SPRAY NASAL at 23:12

## 2022-07-29 RX ADMIN — ZOLPIDEM TARTRATE 5 MG: 5 TABLET ORAL at 22:41

## 2022-07-29 RX ADMIN — FLUTICASONE PROPIONATE 2 SPRAY: 50 SPRAY, METERED NASAL at 21:09

## 2022-07-29 RX ADMIN — SUCRALFATE 1 G: 1 TABLET ORAL at 21:09

## 2022-07-29 RX ADMIN — SODIUM CHLORIDE: 9 INJECTION, SOLUTION INTRAVENOUS at 20:24

## 2022-07-29 RX ADMIN — PROTHROMBIN, COAGULATION FACTOR VII HUMAN, COAGULATION FACTOR IX HUMAN, COAGULATION FACTOR X HUMAN, PROTEIN C, PROTEIN S HUMAN, AND WATER 2000 UNITS: KIT at 18:23

## 2022-07-29 RX ADMIN — LOSARTAN POTASSIUM 50 MG: 25 TABLET, FILM COATED ORAL at 21:09

## 2022-07-29 RX ADMIN — METOPROLOL SUCCINATE 25 MG: 25 TABLET, EXTENDED RELEASE ORAL at 21:09

## 2022-07-29 RX ADMIN — SODIUM CHLORIDE 8 MG/HR: 9 INJECTION, SOLUTION INTRAVENOUS at 18:54

## 2022-07-29 RX ADMIN — SODIUM CHLORIDE 80 MG: 9 INJECTION, SOLUTION INTRAVENOUS at 17:45

## 2022-07-29 ASSESSMENT — PAIN SCALES - GENERAL: PAINLEVEL_OUTOF10: 0

## 2022-07-29 ASSESSMENT — PAIN - FUNCTIONAL ASSESSMENT: PAIN_FUNCTIONAL_ASSESSMENT: NONE - DENIES PAIN

## 2022-07-29 ASSESSMENT — LIFESTYLE VARIABLES: HOW OFTEN DO YOU HAVE A DRINK CONTAINING ALCOHOL: NEVER

## 2022-07-29 NOTE — ED PROVIDER NOTES
I independently performed a history and physical on Daniela Byers. I personally saw the patient and performed a substantive portion of the visit including all aspects of the medical decision making. All diagnostic, treatment, and disposition decisions were made by myself in conjunction with the advanced practice provider. I have participated in the medical decision making and directed the treatment plan and disposition of the patient. For further details of [facility] emergency department encounter, please see the advanced practice provider's documentation. CHIEF COMPLAINT  Chief Complaint   Patient presents with    Abnormal Lab     Low  H&H       Briefly, Daniela Byers is a 80 y.o. female  who presents to the ED complaining of anemia    FOCUSED PHYSICAL EXAMINATION  BP (!) 158/72   Pulse 76   Temp 98.3 °F (36.8 °C) (Oral)   Resp 21   Ht 5' 2\" (1.575 m)   Wt 160 lb (72.6 kg)   SpO2 96%   BMI 29.26 kg/m²      Focused physical examination:  General appearance:  Cooperative. Pale in appearance but otherwise in no acute distress. Skin:  Warm. Dry. Eye:  Extraocular movements intact. Ears, nose, mouth and throat:  Oral mucosa moist,  Neck:  Trachea midline. Heart:  Regular rate and rhythm  Perfusion:  intact  Respiratory:  Respirations nonlabored. Lungs clear to auscultation bilaterally. Abdominal:   Non distended. Nontender  Neurological:  Alert and oriented x 3. Moves all extremities spontaneously  Musculoskeletal:   Normal ROM, no deformities          Psychiatric:  Normal mood      MDM: Patient presents emergency department today after being found to be anemic. Question dark stool at home. Prior history of upper GI bleed secondary to ulcerative disease. No stool in the rectal vault today with which to test, but concerning for GI bleed and will administer Protonix plan to admit to the hospital with GI consultation.   Hemodynamically stable at present and will hold off on transfusion at this time    During the patient's ED course, the patient was given:  Medications   pantoprazole (PROTONIX) 80 mg in sodium chloride 0.9 % 50 mL bolus (has no administration in time range)   pantoprazole (PROTONIX) 80 mg in sodium chloride 0.9 % 100 mL infusion (has no administration in time range)   prothrombin complex concentrate (human) (KCENTRA) infusion 2,000 Units (has no administration in time range)     Followed by   0.9 % sodium chloride infusion (has no administration in time range)   0.9 % sodium chloride bolus (500 mLs IntraVENous New Bag 7/29/22 7422)        CLINICAL IMPRESSION  1. Melena    2. Anemia, unspecified type        DISPOSITION  Admission      This chart was created using Dragon dictation software. Efforts were made by me to ensure accuracy, however some errors may be present due to limitations of this technology.             Pilar Hunt MD  07/29/22 3678

## 2022-07-29 NOTE — ED NOTES
0063- called [facility] for consult per ROSELIA Cota   RE: [facility]    Chronic anemia reported melena history of \"bleeding ulcers\"  8033- Dr. Benjaman Skiff called back and spoke with ROSELIA Yepez  07/29/22 1300

## 2022-07-29 NOTE — TELEPHONE ENCOUNTER
Esme(daughter/NOT ON HIPPA) called stating that pt now has blood in her stool. She was very insistent on speaking with PCP regarding pt. Informed that PCP doesn't work on Friday's but I'll send a message to provider. She was very insistent on PCP calling pt's  to discuss. Advised with pt having blood in her stool now that she needs to go to ER. Voiced understanding and stated they would take her to [place]. Eden Holt requesting a call back [phone] or call pt's , Daniel, [phone]  Routing to [place] due to PCP out of office. Also routing to PCP.

## 2022-07-30 LAB
ABO/RH: NORMAL
ANTIBODY SCREEN: NORMAL
APTT: 29.9 SEC (ref 23–34.3)
BLOOD BANK DISPENSE STATUS: NORMAL
BLOOD BANK PRODUCT CODE: NORMAL
BPU ID: NORMAL
DESCRIPTION BLOOD BANK: NORMAL
FERRITIN: 39 NG/ML (ref 15–150)
HCT VFR BLD CALC: 22.6 % (ref 36–48)
HCT VFR BLD CALC: 26.6 % (ref 36–48)
HCT VFR BLD CALC: 27.6 % (ref 36–48)
HCT VFR BLD CALC: 28.9 % (ref 36–48)
HEMOGLOBIN: 6.9 G/DL (ref 12–16)
HEMOGLOBIN: 8.4 G/DL (ref 12–16)
HEMOGLOBIN: 8.7 G/DL (ref 12–16)
HEMOGLOBIN: 9.1 G/DL (ref 12–16)
IRON SATURATION: 8 % (ref 15–50)
IRON: 23 UG/DL (ref 37–145)
TOTAL IRON BINDING CAPACITY: 273 UG/DL (ref 260–445)

## 2022-07-30 PROCEDURE — 85018 HEMOGLOBIN: CPT

## 2022-07-30 PROCEDURE — 36430 TRANSFUSION BLD/BLD COMPNT: CPT

## 2022-07-30 PROCEDURE — 6370000000 HC RX 637 (ALT 250 FOR IP): Performed by: INTERNAL MEDICINE

## 2022-07-30 PROCEDURE — 0DB68ZX EXCISION OF STOMACH, VIA NATURAL OR ARTIFICIAL OPENING ENDOSCOPIC, DIAGNOSTIC: ICD-10-PCS | Performed by: INTERNAL MEDICINE

## 2022-07-30 PROCEDURE — 94761 N-INVAS EAR/PLS OXIMETRY MLT: CPT

## 2022-07-30 PROCEDURE — 6360000002 HC RX W HCPCS: Performed by: INTERNAL MEDICINE

## 2022-07-30 PROCEDURE — C9113 INJ PANTOPRAZOLE SODIUM, VIA: HCPCS | Performed by: INTERNAL MEDICINE

## 2022-07-30 PROCEDURE — G0378 HOSPITAL OBSERVATION PER HR: HCPCS

## 2022-07-30 PROCEDURE — 7100000011 HC PHASE II RECOVERY - ADDTL 15 MIN: Performed by: INTERNAL MEDICINE

## 2022-07-30 PROCEDURE — 7100000010 HC PHASE II RECOVERY - FIRST 15 MIN: Performed by: INTERNAL MEDICINE

## 2022-07-30 PROCEDURE — 2700000000 HC OXYGEN THERAPY PER DAY

## 2022-07-30 PROCEDURE — 88305 TISSUE EXAM BY PATHOLOGIST: CPT

## 2022-07-30 PROCEDURE — 1200000000 HC SEMI PRIVATE

## 2022-07-30 PROCEDURE — 6370000000 HC RX 637 (ALT 250 FOR IP): Performed by: NURSE PRACTITIONER

## 2022-07-30 PROCEDURE — 3609012400 HC EGD TRANSORAL BIOPSY SINGLE/MULTIPLE: Performed by: INTERNAL MEDICINE

## 2022-07-30 PROCEDURE — 0DB78ZX EXCISION OF STOMACH, PYLORUS, VIA NATURAL OR ARTIFICIAL OPENING ENDOSCOPIC, DIAGNOSTIC: ICD-10-PCS | Performed by: INTERNAL MEDICINE

## 2022-07-30 PROCEDURE — 96376 TX/PRO/DX INJ SAME DRUG ADON: CPT

## 2022-07-30 PROCEDURE — 85014 HEMATOCRIT: CPT

## 2022-07-30 PROCEDURE — 99153 MOD SED SAME PHYS/QHP EA: CPT | Performed by: INTERNAL MEDICINE

## 2022-07-30 PROCEDURE — 2580000003 HC RX 258: Performed by: INTERNAL MEDICINE

## 2022-07-30 PROCEDURE — 36415 COLL VENOUS BLD VENIPUNCTURE: CPT

## 2022-07-30 PROCEDURE — 85730 THROMBOPLASTIN TIME PARTIAL: CPT

## 2022-07-30 PROCEDURE — 99152 MOD SED SAME PHYS/QHP 5/>YRS: CPT | Performed by: INTERNAL MEDICINE

## 2022-07-30 PROCEDURE — 2709999900 HC NON-CHARGEABLE SUPPLY: Performed by: INTERNAL MEDICINE

## 2022-07-30 RX ORDER — FENTANYL CITRATE 50 UG/ML
INJECTION, SOLUTION INTRAMUSCULAR; INTRAVENOUS PRN
Status: DISCONTINUED | OUTPATIENT
Start: 2022-07-30 | End: 2022-07-30 | Stop reason: ALTCHOICE

## 2022-07-30 RX ORDER — MIDAZOLAM HYDROCHLORIDE 5 MG/ML
INJECTION INTRAMUSCULAR; INTRAVENOUS PRN
Status: DISCONTINUED | OUTPATIENT
Start: 2022-07-30 | End: 2022-07-30 | Stop reason: ALTCHOICE

## 2022-07-30 RX ORDER — CIPROFLOXACIN HYDROCHLORIDE 3.5 MG/ML
1 SOLUTION/ DROPS TOPICAL EVERY 6 HOURS SCHEDULED
Status: DISCONTINUED | OUTPATIENT
Start: 2022-07-30 | End: 2022-08-01

## 2022-07-30 RX ORDER — SUCRALFATE 1 G/1
1 TABLET ORAL EVERY 6 HOURS SCHEDULED
Status: DISCONTINUED | OUTPATIENT
Start: 2022-07-30 | End: 2022-08-02 | Stop reason: HOSPADM

## 2022-07-30 RX ORDER — SODIUM CHLORIDE 9 MG/ML
INJECTION, SOLUTION INTRAVENOUS PRN
Status: DISCONTINUED | OUTPATIENT
Start: 2022-07-30 | End: 2022-08-02 | Stop reason: HOSPADM

## 2022-07-30 RX ADMIN — CIPROFLOXACIN 1 DROP: 3 SOLUTION OPHTHALMIC at 10:40

## 2022-07-30 RX ADMIN — ZOLPIDEM TARTRATE 5 MG: 5 TABLET ORAL at 23:07

## 2022-07-30 RX ADMIN — OXYMETAZOLINE HCL 2 SPRAY: 0.05 SPRAY NASAL at 23:04

## 2022-07-30 RX ADMIN — METOPROLOL SUCCINATE 25 MG: 25 TABLET, EXTENDED RELEASE ORAL at 10:06

## 2022-07-30 RX ADMIN — LEVOTHYROXINE SODIUM 150 MCG: 0.15 TABLET ORAL at 06:12

## 2022-07-30 RX ADMIN — LOSARTAN POTASSIUM 50 MG: 25 TABLET, FILM COATED ORAL at 20:40

## 2022-07-30 RX ADMIN — LEFLUNOMIDE 20 MG: 10 TABLET ORAL at 10:40

## 2022-07-30 RX ADMIN — METOPROLOL SUCCINATE 25 MG: 25 TABLET, EXTENDED RELEASE ORAL at 20:40

## 2022-07-30 RX ADMIN — LOSARTAN POTASSIUM 50 MG: 25 TABLET, FILM COATED ORAL at 10:06

## 2022-07-30 RX ADMIN — SODIUM CHLORIDE: 9 INJECTION, SOLUTION INTRAVENOUS at 11:39

## 2022-07-30 RX ADMIN — FLUTICASONE PROPIONATE 2 SPRAY: 50 SPRAY, METERED NASAL at 20:40

## 2022-07-30 RX ADMIN — Medication 400 MG: at 10:06

## 2022-07-30 RX ADMIN — CIPROFLOXACIN 1 DROP: 3 SOLUTION OPHTHALMIC at 18:05

## 2022-07-30 RX ADMIN — TROSPIUM CHLORIDE 20 MG: 20 TABLET, FILM COATED ORAL at 20:40

## 2022-07-30 RX ADMIN — CIPROFLOXACIN 1 DROP: 3 SOLUTION OPHTHALMIC at 23:04

## 2022-07-30 RX ADMIN — SODIUM CHLORIDE 8 MG/HR: 9 INJECTION, SOLUTION INTRAVENOUS at 22:20

## 2022-07-30 RX ADMIN — SODIUM CHLORIDE 8 MG/HR: 9 INJECTION, SOLUTION INTRAVENOUS at 11:40

## 2022-07-30 RX ADMIN — SODIUM CHLORIDE 8 MG/HR: 9 INJECTION, SOLUTION INTRAVENOUS at 07:37

## 2022-07-30 RX ADMIN — SUCRALFATE 1 G: 1 TABLET ORAL at 20:40

## 2022-07-30 ASSESSMENT — PAIN DESCRIPTION - ORIENTATION: ORIENTATION: RIGHT;LEFT

## 2022-07-30 ASSESSMENT — PAIN DESCRIPTION - LOCATION: LOCATION: HAND;FOOT;BACK

## 2022-07-30 ASSESSMENT — PAIN SCALES - GENERAL
PAINLEVEL_OUTOF10: 0
PAINLEVEL_OUTOF10: 0
PAINLEVEL_OUTOF10: 6

## 2022-07-30 ASSESSMENT — PAIN - FUNCTIONAL ASSESSMENT: PAIN_FUNCTIONAL_ASSESSMENT: PREVENTS OR INTERFERES SOME ACTIVE ACTIVITIES AND ADLS

## 2022-07-30 NOTE — PROGRESS NOTES
Hospitalist Progress Note      PCP: Kathrin Turk MD    Date of Admission: 7/29/2022    Chief Complaint: GI bleed    Hospital Course:   \80 y.o. female who presented to [facility] with above complaints  Patient with PMH of GERD, history of GI bleed due to gastric antral ulcers and Wilbur lesions, HTN, HLD, Hx of DVT on Eliquis, thyroid disease presented to the ED today with abnormal labs. Patient reports her rheumatologist from [facility] had ordered labs on 7/20 which included a CBC. That showed a lower hemoglobin of 8.3. Back in January 2022 her hemoglobin was 12.0. She saw her PCP on 7/26 and had repeat labs done which showed a hemoglobin of 7.9. Today family reported that they saw some blood in her stools. They were asked to come to the ED for further management. Patient reports she usually does not visualize her stools after a bowel movement so she does not know if she was having any hematochezia or melena. She denied any abdominal pain or bloody vomiting. She takes Eliquis every day for history of DVT. Denied any NSAID use. She reportedly has doubled her Protonix and is taking some iron supplements as well. Back in 2018 November she underwent EGD for GI bleed here at [facility] which showed 2 small 6 mm white-based antral ulcers, gastritis and HH with Wilbur erosions. She reports her sulfasalazine was stopped by her rheumatologist.     Subjective:   Patient feeling better. He denies abdominal pain. Patient was hoping that she could be discharged today. Patient status post EGD. 2 antral ulcers noted not acutely bleeding.     Medications:  Reviewed    Infusion Medications    sodium chloride      pantoprazole 8 mg/hr (07/30/22 1140)    sodium chloride      sodium chloride 75 mL/hr at 07/30/22 1139    sodium chloride       Scheduled Medications    ciprofloxacin  1 drop Both Eyes 4 times per day    leflunomide  20 mg Oral Daily    levothyroxine  150 mcg Oral Daily magnesium oxide  400 mg Oral Daily    trospium  20 mg Oral Nightly    sucralfate  1 g Oral Nightly    sodium chloride flush  5-40 mL IntraVENous 2 times per day    losartan  50 mg Oral BID    metoprolol succinate  25 mg Oral BID    fluticasone  2 spray Each Nostril Nightly     PRN Meds: sodium chloride, HYDROcodone-acetaminophen, zolpidem, sodium chloride flush, sodium chloride, ondansetron **OR** ondansetron, acetaminophen **OR** acetaminophen, oxymetazoline      Intake/Output Summary (Last 24 hours) at 7/30/2022 1256  Last data filed at 7/30/2022 6748  Gross per 24 hour   Intake 2184.21 ml   Output 2500 ml   Net -315.79 ml       Physical Exam Performed:    BP (!) 167/83   Pulse 70   Temp 98.1 °F (36.7 °C) (Oral)   Resp 16   Ht 5' 2\" (1.575 m)   Wt 160 lb (72.6 kg)   SpO2 99%   BMI 29.26 kg/m²     General appearance: No apparent distress, appears stated age and cooperative. HEENT: Pupils equal, round, and reactive to light. Conjunctivae/corneas clear. Neck: Supple, with full range of motion. No jugular venous distention. Trachea midline. Respiratory:  Normal respiratory effort. Clear to auscultation, bilaterally without Rales/Wheezes/Rhonchi. Cardiovascular: Regular rate and rhythm with normal S1/S2 without murmurs, rubs or gallops. Abdomen: Soft, non-tender, non-distended with normal bowel sounds. Musculoskeletal: No clubbing, cyanosis or edema bilaterally. Full range of motion without deformity. Skin: Skin color, texture, turgor normal.  No rashes or lesions. Neurologic:  Neurovascularly intact without any focal sensory/motor deficits.  Cranial nerves: II-XII intact, grossly non-focal.  Psychiatric: Alert and oriented, thought content appropriate, normal insight  Capillary Refill: Brisk,3 seconds, normal   Peripheral Pulses: +2 palpable, equal bilaterally       Labs:   Recent Labs     07/29/22  1450 07/29/22  1624 07/30/22  0010 07/30/22  0712 07/30/22  1220   WBC 6.1 4.9  --   --   --    HGB 8.3* 7. 5* 6.9* 8.4* 8.7*   HCT 26.4* 23.9* 22.6* 26.6* 27.6*    214  --   --   --      Recent Labs     07/29/22  1450      K 4.3      CO2 26   BUN 22*   CREATININE 0.7   CALCIUM 9.7     Recent Labs     07/29/22  1450   AST 23   ALT 12   BILITOT <0.2   ALKPHOS 98     Recent Labs     07/29/22  1450   INR 1.16*     No results for input(s): Brian Brasher in the last 72 hours. Urinalysis:      Lab Results   Component Value Date/Time    NITRU POSITIVE 05/28/2019 03:20 PM    WBCUA 0-2 05/28/2019 03:20 PM    BACTERIA 4+ 05/28/2019 03:20 PM    RBCUA 3-5 05/28/2019 03:20 PM    BLOODU TRACE-LYSED 05/28/2019 03:20 PM    SPECGRAV <=1.005 05/28/2019 03:20 PM    GLUCOSEU Negative 05/28/2019 03:20 PM       Radiology:  No orders to display           Assessment/Plan:    Active Hospital Problems    Diagnosis     Acute GI hemorrhage [K92.2]      Priority: Medium    History of DVT (deep vein thrombosis) [Z86.718]      Priority: Medium    CHANDRIKA on CPAP [G47.33, Z99.89]      Priority: Medium    Wilbur lesion, 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 Acute blood loss anemia. Possibly secondary to ulcers. Patient received transfusion earlier. Will monitor hemoglobin closely. Consider discharge if hemoglobin stable tomorrow. 2.  DVT. Patient on Eliquis will hold Eliquis she may need this held for couple days until she follows up with her physician. 3.  Rheumatoid arthritis. Continue current medications patient will follow with rheumatologist as scheduled. 4.  Hypothyroidism. We will continue Synthroid monitor closely.     DVT Prophylaxis: AC on hold due

## 2022-07-30 NOTE — H&P
XL) 25 MG extended release tablet Take 0.5 tablets by mouth daily  Patient taking differently: Take 25 mg by mouth in the morning. Take 1 twice daily. 2/22/21   Golden Taylor MD   sucralfate (CARAFATE) 1 GM tablet 1 g nightly 3/26/19   Historical Provider, MD   vitamin D (CHOLECALCIFEROL) 1000 UNIT TABS tablet Take 4,000 Units by mouth daily    Historical Provider, MD   Multiple Vitamins-Minerals (THERAPEUTIC MULTIVITAMIN-MINERALS) tablet Take 1 tablet by mouth daily    Historical Provider, MD   Omega-3 Fatty Acids (FISH OIL) 1000 MG CAPS Take 1,000 mg by mouth 3 times daily    Historical Provider, MD   leflunomide (ARAVA) 20 MG tablet Take 20 mg by mouth daily    Historical Provider, MD   aspirin 81 MG tablet Take 81 mg by mouth daily    Historical Provider, MD       Allergies:  Adhesive tape and Amlodipine    Social History:      The patient currently lives at home    TOBACCO:   reports that she has never smoked. She has never used smokeless tobacco.  ETOH:   reports no history of alcohol use. E-cigarette/Vaping       Questions Responses    E-cigarette/Vaping Use Never User    Start Date     Passive Exposure     Quit Date     Counseling Given     Comments               Family History:      Reviewed and negative in regards to presenting illness/complaint. History reviewed. No pertinent family history. REVIEW OF SYSTEMS COMPLETED:   Pertinent positives as noted in the HPI. All other systems reviewed and negative. PHYSICAL EXAM PERFORMED:    BP (!) 155/71   Pulse 68   Temp 98.6 °F (37 °C) (Oral)   Resp 16   Ht 5' 2\" (1.575 m)   Wt 160 lb (72.6 kg)   SpO2 96%   BMI 29.26 kg/m²     General appearance:  No apparent distress, appears stated age and cooperative. HEENT:  Normal cephalic, atraumatic without obvious deformity. Pupils equal, round, and reactive to light. Extra ocular muscles intact. Conjunctivae/corneas clear. Neck: Supple, with full range of motion.  No jugular venous distention. Trachea midline. Respiratory:  Normal respiratory effort. Clear to auscultation, bilaterally without Rales/Wheezes/Rhonchi. Cardiovascular:  Regular rate and rhythm with normal S1/S2 without murmurs, rubs or gallops. Abdomen: Soft, non-tender, non-distended with normal bowel sounds. Musculoskeletal:  No clubbing, cyanosis or edema bilaterally. Full range of motion without deformity. Skin: Skin color, texture, turgor normal.  No rashes or lesions. Neurologic:  Neurovascularly intact without any focal sensory/motor deficits. Cranial nerves: II-XII intact, grossly non-focal.  Psychiatric:  Alert and oriented, thought content appropriate, normal insight  Capillary Refill: Brisk,3 seconds, normal  Peripheral Pulses: +2 palpable, equal bilaterally       Labs:     Recent Labs     07/29/22  1450 07/29/22  1624   WBC 6.1 4.9   HGB 8.3* 7.5*   HCT 26.4* 23.9*    214     Recent Labs     07/29/22  1450      K 4.3      CO2 26   BUN 22*   CREATININE 0.7   CALCIUM 9.7     Recent Labs     07/29/22  1450   AST 23   ALT 12   BILITOT <0.2   ALKPHOS 98     Recent Labs     07/29/22  1450   INR 1.16*     No results for input(s): CKTOTAL, TROPONINI in the last 72 hours. Urinalysis:      Lab Results   Component Value Date/Time    NITRU POSITIVE 05/28/2019 03:20 PM    WBCUA 0-2 05/28/2019 03:20 PM    BACTERIA 4+ 05/28/2019 03:20 PM    RBCUA 3-5 05/28/2019 03:20 PM    BLOODU TRACE-LYSED 05/28/2019 03:20 PM    SPECGRAV <=1.005 05/28/2019 03:20 PM    GLUCOSEU Negative 05/28/2019 03:20 PM       Consults:    IP CONSULT TO GI    ASSESSMENT:PLAN:      Suspected chronic occult GI bleed with acute component  Hb in April was 11.5, today 7.9.   Possible occult GI blood loss with anemia, history of gastric ulcers with Viveca Limes erosions back in 2018 on EGD which may be the suspected source, patient is on Eliquis  -Hold Eliquis/aspirin  -GI consulted, plan for EGD in a.m.  -N.p.o. 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 If you have any questions or concerns please feel free to contact me at [phone].

## 2022-07-30 NOTE — OP NOTE
Esophagogastroduodenoscopy Note    Patient:   Nallely Varghese    YOB: 1927    Facility:     [facility] ENDOSCOPY  [address] [address]   [Inpatient]   Referring/PCP: Arthur Romano MD    Procedure:   Esophagogastroduodenoscopy with biopsy  Date:     7/30/2022   Endoscopist:  Yoseph Moore DO     Preoperative Diagnosis:   Melena    Postoperative Diagnosis:  same    Anesthesia:  Versed 2 mg IV, fentanyl 12.5 mcg IV    Estimated blood loss: Minimal    Complications: None    Description of Procedure:  Informed consent was obtained from the patient after explanation of the procedure including indications, description of the procedure,  benefits and possible risks and complications of the procedure, and alternatives. Questions were answered. The patient's history was reviewed and a directed physical examination was performed prior to the procedure. Patient was monitored throughout the procedure with pulse oximetry and periodic assessment of vital signs. Patient was sedated as noted above. The Nursing staff and I performed a time out. With the patient in the left lateral decubitus position, the Olympus videoendoscope was placed in the patient's mouth and under direct visualization passed into the esophagus. The scope was ultimately passed to the second portion of the duodenum. Visualization was performed during both introduction and withdrawal of the endoscope and retroflexed view of the proximal stomach was obtained. Findings[de-identified]   Esophagus: normal. The findings do not support a diagnosis of Young's Esophagus. A hiatal hernia was seen from 30 to 40 cm. Stomach: Two clean based ulcers were noted at the antrum. Biopsies were obtained of the angularis to evaluate for h pylori. Hyperplastic changes were seen in the pyloric channel and biopsied.   Duodenum: normal    Recommendations:   Await pathology results  Hold aspirin for 2 weeks, continue xarelto  BID high dose PPI for 8 weeks  Carafate 1 gram qid for 4 weeks  OK with discharge    Leobardo Barragan, [address]     Phone: [phone]     Fax: [phone]

## 2022-07-30 NOTE — PROGRESS NOTES
Blood reached patient's vein at this 0344 am, this RN stayed with patient for 15 mins. VSS. Transfusion from rate increased from 60 ml/hr to 125 ml/hr. Patient states she has never had blood transfusion , encouraged to report any adverse reaction.  Will continue to monitor

## 2022-07-30 NOTE — PROGRESS NOTES
Report called to Ida Hodges RN post EGD. Patient tolerated procedure well. VSS although O2 drops slightly (88-89%) when patient drifts off to sleep while in recovery. 2L O2 placed via nasal cannula while patient sleeping. Will continue to monitor and return to room when recovery phase complete.   Hali Anguiano RN

## 2022-07-30 NOTE — ED PROVIDER NOTES
[facility] Emergency Department    CHIEF COMPLAINT  Abnormal Lab (Low  H&H)      HISTORY OF PRESENT ILLNESS  Carlos Payne is a 80 y.o. female who presents to the ED complaining of black stool, lightheadedness, low H&H. Patient reports symptoms started about 1 week ago. Patient has a history of gastric ulcers a few years ago. Patient denies fever, chills, body aches, syncope, chest pain, shortness of breath, abdominal pain, nausea, vomiting, diarrhea, constipation. Patient's PCP has been monitoring her H&H this week. Patient has been reports that they performed Hemoccult that was \"positive. \"  Patient's  also reports that they performed a POCT hemoglobin that was \"5.\"    No other complaints, modifying factors or associated symptoms. Nursing notes reviewed. Past Medical History:   Diagnosis Date    Arthritis     Arthritis     possibly Rheumatoid, Dr. Jose Ramon Day    CAD (coronary artery disease)     GERD (gastroesophageal reflux disease)     Hyperlipidemia     Hypertension     Thyroid disease      Past Surgical History:   Procedure Laterality Date    CHOLECYSTECTOMY      CORONARY ANGIOPLASTY WITH STENT PLACEMENT      JOINT REPLACEMENT      \"knees and hips\"    UT ESOPHAGOGASTRODUODENOSCOPY TRANSORAL DIAGNOSTIC N/A 11/7/2018    EGD ESOPHAGOGASTRODUODENOSCOPY performed by Sanjeev Giles MD at [address] [place] [address]     History reviewed. No pertinent family history.   Social History     Socioeconomic History    Marital status:      Spouse name: Not on file    Number of children: Not on file    Years of education: Not on file    Highest education level: Not on file   Occupational History    Not on file   Tobacco Use    Smoking status: Never    Smokeless tobacco: Never   Vaping Use    Vaping Use: Never used   Substance and Sexual Activity    Alcohol use: No    Drug use: No    Sexual activity: Not on file   Other Topics Concern    Not on file   Social History Narrative    Not on file     Social Determinants of Health     Financial Resource Strain: Not on file   Food Insecurity: Not on file   Transportation Needs: Not on file   Physical Activity: Not on file   Stress: Not on file   Social Connections: Not on file   Intimate Partner Violence: Not on file   Housing Stability: Not on file     Current Facility-Administered Medications   Medication Dose Route Frequency Provider Last Rate Last Admin    pantoprazole (PROTONIX) 80 mg in sodium chloride 0.9 % 100 mL infusion  8 mg/hr IntraVENous Continuous Lilibeth Machado MD 10 mL/hr at 07/29/22 1854 8 mg/hr at 07/29/22 1854    HYDROcodone-acetaminophen (NORCO) 5-325 MG per tablet 1 tablet  1 tablet Oral BID PRN MD Todd Payton ON 7/30/2022] leflunomide (ARAVA) tablet 20 mg  20 mg Oral Daily Lilibeth Machado MD        [START ON 7/30/2022] levothyroxine (SYNTHROID) tablet 150 mcg  150 mcg Oral Daily Lilibeth Machado MD        [START ON 7/30/2022] magnesium oxide (MAG-OX) tablet 400 mg  400 mg Oral Daily Lilibeth Machado MD        trospium (SANCTURA) tablet 20 mg  20 mg Oral Nightly Lilibeth Machado MD   20 mg at 07/29/22 2109    sucralfate (CARAFATE) tablet 1 g  1 g Oral Nightly Lilibeth Machado MD   1 g at 07/29/22 2109    zolpidem (AMBIEN) tablet 5 mg  5 mg Oral Nightly PRN Lilibeth Machado MD   5 mg at 07/29/22 2241    sodium chloride flush 0.9 % injection 5-40 mL  5-40 mL IntraVENous 2 times per day Lilibeth Machado MD        sodium chloride flush 0.9 % injection 5-40 mL  5-40 mL IntraVENous PRN Lilibeth Machado MD        0.9 % sodium chloride infusion   IntraVENous PRN Lilibeth Machado MD        ondansetron (ZOFRAN-ODT) disintegrating tablet 4 mg  4 mg Oral Q8H PRN Lilibeth Machado MD        Or    ondansetron (ZOFRAN) injection 4 mg  4 mg IntraVENous Q6H PRN Lilibeth Machado MD        acetaminophen (TYLENOL) tablet 650 mg  650 mg Oral Q6H PRN Lilibeth Machado MD        Or acetaminophen (TYLENOL) suppository 650 mg  650 mg Rectal Q6H PRN Anton Lopez MD        0.9 % sodium chloride infusion   IntraVENous Continuous Anton Lopez MD 75 mL/hr at 07/29/22 2024 New Bag at 07/29/22 2024    0.9 % sodium chloride infusion  50 mL IntraVENous Once Gabe Barvo MD        losartan (COZAAR) tablet 50 mg  50 mg Oral BID Delene Jeanne, APRN - CNP   50 mg at 07/29/22 2109    metoprolol succinate (TOPROL XL) extended release tablet 25 mg  25 mg Oral BID Delene Jeanne, APRN - CNP   25 mg at 07/29/22 2109    fluticasone (FLONASE) 50 MCG/ACT nasal spray 2 spray  2 spray Each Nostril Nightly Delene Jeanne, APRN - CNP   2 spray at 07/29/22 2109    oxymetazoline (AFRIN) 0.05 % nasal spray 2 spray  2 spray Each Nostril BID PRN Delene Jeanne, APRN - CNP         Allergies   Allergen Reactions    Adhesive Tape      Blisters     Amlodipine Hives       REVIEW OF SYSTEMS  10 systems reviewed, pertinent positives per HPI otherwise noted to be negative    PHYSICAL EXAM  BP (!) 155/71   Pulse 68   Temp 98.6 °F (37 °C) (Oral)   Resp 16   Ht 5' 2\" (1.575 m)   Wt 160 lb (72.6 kg)   SpO2 96%   BMI 29.26 kg/m²   GENERAL APPEARANCE: Awake and alert. Cooperative. No acute distress. Vital signs are stable, orthostatic vital signs unremarkable. Well appearing and non toxic. HEAD: Normocephalic. Atraumatic. EYES: PERRL. EOM's grossly intact. ENT: Mucous membranes are moist.   NECK: Supple. Normal ROM. HEART: RRR. Distal pulses are equal and intact. Cap refill less than 2 seconds. LUNGS: Respirations unlabored. CTAB. Good air exchange. Speaking comfortably in full sentences. No wheezing, rhonchi, rales, stridor. ABDOMEN: Soft. Non-distended. Non-tender. No guarding or rebound. No rigidity. Bowel sounds are present. Negative rico's. Negative McBurney's point. Negative CVA tenderness. EXTREMITIES: No peripheral edema. Moves all extremities equally.  All extremities Differential   Result Value Ref Range    WBC 6.1 4.0 - 11.0 K/uL    RBC 3.00 (L) 4.00 - 5.20 M/uL    Hemoglobin 8.3 (L) 12.0 - 16.0 g/dL    Hematocrit 26.4 (L) 36.0 - 48.0 %    MCV 87.9 80.0 - 100.0 fL    MCH 27.5 26.0 - 34.0 pg    MCHC 31.3 31.0 - 36.0 g/dL    RDW 19.2 (H) 12.4 - 15.4 %    Platelets 363 765 - 311 K/uL    MPV 7.5 5.0 - 10.5 fL    Neutrophils % 61.5 %    Lymphocytes % 18.0 %    Monocytes % 12.8 %    Eosinophils % 6.1 %    Basophils % 1.6 %    Neutrophils Absolute 3.7 1.7 - 7.7 K/uL    Lymphocytes Absolute 1.1 1.0 - 5.1 K/uL    Monocytes Absolute 0.8 0.0 - 1.3 K/uL    Eosinophils Absolute 0.4 0.0 - 0.6 K/uL    Basophils Absolute 0.1 0.0 - 0.2 K/uL   Comprehensive Metabolic Panel w/ Reflex to MG   Result Value Ref Range    Sodium 139 136 - 145 mmol/L    Potassium reflex Magnesium 4.3 3.5 - 5.1 mmol/L    Chloride 103 99 - 110 mmol/L    CO2 26 21 - 32 mmol/L    Anion Gap 10 3 - 16    Glucose 110 (H) 70 - 99 mg/dL    BUN 22 (H) 7 - 20 mg/dL    Creatinine 0.7 0.6 - 1.2 mg/dL    GFR Non-African American >60 >60    GFR African American >60 >60    Calcium 9.7 8.3 - 10.6 mg/dL    Total Protein 6.7 6.4 - 8.2 g/dL    Albumin 3.9 3.4 - 5.0 g/dL    Albumin/Globulin Ratio 1.4 1.1 - 2.2    Total Bilirubin <0.2 0.0 - 1.0 mg/dL    Alkaline Phosphatase 98 40 - 129 U/L    ALT 12 10 - 40 U/L    AST 23 15 - 37 U/L   Sample possible blood bank testing   Result Value Ref Range    Specimen Status KELLI    CBC with Auto Differential   Result Value Ref Range    WBC 4.9 4.0 - 11.0 K/uL    RBC 2.73 (L) 4.00 - 5.20 M/uL    Hemoglobin 7.5 (L) 12.0 - 16.0 g/dL    Hematocrit 23.9 (L) 36.0 - 48.0 %    MCV 87.4 80.0 - 100.0 fL    MCH 27.3 26.0 - 34.0 pg    MCHC 31.2 31.0 - 36.0 g/dL    RDW 18.7 (H) 12.4 - 15.4 %    Platelets 906 598 - 868 K/uL    MPV 7.3 5.0 - 10.5 fL    Neutrophils % 64.5 %    Lymphocytes % 15.5 %    Monocytes % 12.9 %    Eosinophils % 5.5 %    Basophils % 1.6 %    Neutrophils Absolute 3.2 1.7 - 7.7 K/uL

## 2022-07-30 NOTE — PLAN OF CARE
Problem: Pain  Goal: Verbalizes/displays adequate comfort level or baseline comfort level  Outcome: Progressing     Problem: Skin/Tissue Integrity  Goal: Absence of new skin breakdown  Description: 1. Monitor for areas of redness and/or skin breakdown  2. Assess vascular access sites hourly  3. Every 4-6 hours minimum:  Change oxygen saturation probe site  4. Every 4-6 hours:  If on nasal continuous positive airway pressure, respiratory therapy assess nares and determine need for appliance change or resting period.   Outcome: Progressing     Problem: ABCDS Injury Assessment  Goal: Absence of physical injury  Outcome: Progressing     Problem: Safety - Adult  Goal: Free from fall injury  Outcome: Progressing     Problem: Hematologic - Adult  Goal: Maintains hematologic stability  Outcome: Progressing  Flowsheets (Taken 7/29/2022 1915)  Maintains hematologic stability: Monitor labs for bleeding or clotting disorders

## 2022-07-30 NOTE — H&P
Gastroenterology Preop Assessment    Patient:   Urban Villegas   :    1927   Facility:   [address]  Referring/PCP: Yanely Pascual MD  Date:     2022    Subjective:   Procedure: EGD    HPI/Reason for procedure:  Melena    Past Medical History:   Diagnosis Date    Arthritis     Arthritis     possibly Rheumatoid, Dr. Rufus Garcia    CAD (coronary artery disease)     GERD (gastroesophageal reflux disease)     Hyperlipidemia     Hypertension     Thyroid disease      Past Surgical History:   Procedure Laterality Date    CHOLECYSTECTOMY      CORONARY ANGIOPLASTY WITH STENT PLACEMENT      JOINT REPLACEMENT      \"knees and hips\"    OR ESOPHAGOGASTRODUODENOSCOPY TRANSORAL DIAGNOSTIC N/A 2018    EGD ESOPHAGOGASTRODUODENOSCOPY performed by Lilli Cai MD at [address] [facility]  [address]:   Social History     Tobacco Use    Smoking status: Never    Smokeless tobacco: Never   Substance Use Topics    Alcohol use: No     Family: History reviewed. No pertinent family history. Scheduled Medications:    leflunomide  20 mg Oral Daily    levothyroxine  150 mcg Oral Daily    magnesium oxide  400 mg Oral Daily    trospium  20 mg Oral Nightly    sucralfate  1 g Oral Nightly    sodium chloride flush  5-40 mL IntraVENous 2 times per day    losartan  50 mg Oral BID    metoprolol succinate  25 mg Oral BID    fluticasone  2 spray Each Nostril Nightly       Current Medications:    Prior to Admission medications    Medication Sig Start Date End Date Taking? Authorizing Provider   pantoprazole (PROTONIX) 40 MG tablet Take 1 tablet by mouth in the morning and 1 tablet in the evening. Take before meals. 22   Perry Harris MD   zolpidem (AMBIEN) 10 MG tablet Take 0.5 tablets by mouth nightly as needed for Sleep for up to 60 days.  22  Perry Harris MD   HYDROcodone-acetaminophen [facility]) 5-325 MG per tablet Take 1 tablet by mouth 2 times daily as needed for Pain for up to 30 days. 7/26/22 8/25/22  Moi Trinidad MD   fluticasone [facility]) 50 MCG/ACT nasal spray INHALE 1 SPRAY INTO EACH NOSTRIL EVERY DAY 6/22/22   Moi Trinidad MD   amoxicillin (AMOXIL) 500 mg capsule Take four tablets by mouth the day prior to dental procedure and then one tablet after dental procedure. Patient not taking: Reported on 7/29/2022 6/1/22   Susi Ramírez APRN - CNP   losartan (COZAAR) 50 MG tablet TAKE 1 TABLET BY MOUTH IN THE MORNING AND ONE TABLET IN THE EVENING. 4/4/22   Moi Trinidad MD   solifenacin (VESICARE) 5 MG tablet Take 1 tablet by mouth daily 3/1/22   Moi Trinidad MD   potassium chloride (KLOR-CON M) 20 MEQ extended release tablet Take 1 tablet by mouth 2 times daily 1/27/22 7/26/22  Moi Trinidad MD   magnesium oxide (MAG-OX) 400 MG tablet TAKE 1 TABLET BY MOUTH EVERY DAY 1/25/22   Moi Trinidad MD   levothyroxine (SYNTHROID) 150 MCG tablet TAKE 1 TABLET BY MOUTH EVERY DAY 9/27/21   Moi Trinidad MD   ELIQUIS 5 MG TABS tablet TAKE 1 TABLET BY MOUTH TWICE A DAY 9/7/21   Moi Trinidad MD   metoprolol succinate (TOPROL XL) 25 MG extended release tablet Take 0.5 tablets by mouth daily  Patient taking differently: Take 25 mg by mouth in the morning. Take 1 twice daily.  2/22/21   Moi Trinidad MD   sucralfate (CARAFATE) 1 GM tablet 1 g nightly 3/26/19   Historical Provider, MD   vitamin D (CHOLECALCIFEROL) 1000 UNIT TABS tablet Take 4,000 Units by mouth daily    Historical Provider, MD   Multiple Vitamins-Minerals (THERAPEUTIC MULTIVITAMIN-MINERALS) tablet Take 1 tablet by mouth daily    Historical Provider, MD   Omega-3 Fatty Acids (FISH OIL) 1000 MG CAPS Take 1,000 mg by mouth 3 times daily    Historical Provider, MD   leflunomide (ARAVA) 20 MG tablet Take 20 mg by mouth daily    Historical Provider, MD   aspirin 81 MG tablet Take 81 mg by mouth daily    Historical Provider, MD         Current Facility-Administered Medications:     0.9 % sodium chloride infusion, , IntraVENous, PRN, Kalina Gonzalez, APRN - CNP    pantoprazole (PROTONIX) 80 mg in sodium chloride 0.9 % 100 mL infusion, 8 mg/hr, IntraVENous, Continuous, Shakir Perez MD, Last Rate: 10 mL/hr at 07/30/22 0737, 8 mg/hr at 07/30/22 0737    HYDROcodone-acetaminophen (NORCO) 5-325 MG per tablet 1 tablet, 1 tablet, Oral, BID PRN, Shakir Perez MD    leflunomide (ARAVA) tablet 20 mg, 20 mg, Oral, Daily, Shakir Perez MD    levothyroxine (SYNTHROID) tablet 150 mcg, 150 mcg, Oral, Daily, Shakir Perez MD, 150 mcg at 07/30/22 5021    magnesium oxide (MAG-OX) tablet 400 mg, 400 mg, Oral, Daily, Shakir Perez MD    trospium (SANCTURA) tablet 20 mg, 20 mg, Oral, Nightly, Shakir Perez MD, 20 mg at 07/29/22 2109    sucralfate (CARAFATE) tablet 1 g, 1 g, Oral, Nightly, Shakir Perez MD, 1 g at 07/29/22 2109    zolpidem (AMBIEN) tablet 5 mg, 5 mg, Oral, Nightly PRN, Shakir Perez MD, 5 mg at 07/29/22 2241    sodium chloride flush 0.9 % injection 5-40 mL, 5-40 mL, IntraVENous, 2 times per day, Shakir Perez MD    sodium chloride flush 0.9 % injection 5-40 mL, 5-40 mL, IntraVENous, PRN, Shakir Perez MD    0.9 % sodium chloride infusion, , IntraVENous, PRN, Shakir Perez MD    ondansetron (ZOFRAN-ODT) disintegrating tablet 4 mg, 4 mg, Oral, Q8H PRN **OR** ondansetron (ZOFRAN) injection 4 mg, 4 mg, IntraVENous, Q6H PRN, Shakir Perez MD    acetaminophen (TYLENOL) tablet 650 mg, 650 mg, Oral, Q6H PRN **OR** acetaminophen (TYLENOL) suppository 650 mg, 650 mg, Rectal, Q6H PRN, Shakir Perez MD    0.9 % sodium chloride infusion, , IntraVENous, Continuous, Shakir Perez MD, Last Rate: 75 mL/hr at 07/30/22 0632, Rate Verify at 07/30/22 9713    [COMPLETED] prothrombin complex concentrate (human) (KCENTRA) infusion 2,000 Units, 2,000 Units, IntraVENous, Once, Last Rate: 500 mL/hr at 07/29/22 1823, 2,000 Units at 07/29/22 1823 **FOLLOWED BY** 0.9 % sodium chloride infusion, 50 mL, IntraVENous, Once, Marisela Mcdonald MD    losartan (COZAAR) tablet 50 mg, 50 mg, Oral, BID, RAMSES Martinez - CNP, 50 mg at 07/29/22 2109    metoprolol succinate (TOPROL XL) extended release tablet 25 mg, 25 mg, Oral, BID, RAMSES Martinez - CNP, 25 mg at 07/29/22 2109    fluticasone (FLONASE) 50 MCG/ACT nasal spray 2 spray, 2 spray, Each Nostril, Nightly, RAMSES Martinez CNP, 2 spray at 07/29/22 2109    oxymetazoline (AFRIN) 0.05 % nasal spray 2 spray, 2 spray, Each Nostril, BID PRN, RAMSES Martinez CNP, 2 spray at 07/29/22 2312      Infusions:    sodium chloride      pantoprazole 8 mg/hr (07/30/22 0737)    sodium chloride      sodium chloride 75 mL/hr at 07/30/22 2074    sodium chloride       PRN Medications: sodium chloride, HYDROcodone-acetaminophen, zolpidem, sodium chloride flush, sodium chloride, ondansetron **OR** ondansetron, acetaminophen **OR** acetaminophen, oxymetazoline  Allergies:    Allergies   Allergen Reactions    Adhesive Tape      Blisters     Amlodipine Hives         Objective:     Physical Exam:   BP (!) 148/71   Pulse 65   Temp 98.1 °F (36.7 °C)   Resp 16   Ht 5' 2\" (1.575 m)   Wt 160 lb (72.6 kg)   SpO2 97%   BMI 29.26 kg/m²     HEENT: NCAT  Lungs: CTAB  CV: RRR  Abd: soft, ntd  Ext: dpi    Lab and Imaging Review   Labs:  CBC:   Recent Labs     07/29/22  1450 07/29/22  1624 07/30/22  0010 07/30/22  0712   WBC 6.1 4.9  --   --    HGB 8.3* 7.5* 6.9* 8.4*   HCT 26.4* 23.9* 22.6* 26.6*   MCV 87.9 87.4  --   --     214  --   --      BMP:   Recent Labs     07/29/22  1450      K 4.3      CO2 26   BUN 22*   CREATININE 0.7     LIVER PROFILE:   Recent Labs     07/29/22  1450   AST 23   ALT 12   PROT 6.7   BILITOT <0.2   ALKPHOS 98     PT/INR:   Recent Labs     07/29/22  1450   INR 1.16*       Pre-Procedure Assessment / Plan:  ASA: Class 3 - A patient with severe systemic disease that limits activity but is not incapacitating  Airway: Mallampati: II (soft palate, uvula, fauces visible)  Level of Sedation Plan: Moderate sedation  Post Procedure plan: Return to same level of care      Plan:   egd    I assessed the patient and find that the patient is in satisfactory condition to proceed with the planned procedure and sedation plan. I have explained the risk, benefits, and alternatives to the procedure; the patient understands and agrees to proceed.        Lynn Jacinto, DO  7/30/2022

## 2022-07-30 NOTE — PLAN OF CARE
Problem: Discharge Planning  Goal: Discharge to home or other facility with appropriate resources  7/30/2022 1724 by Ben Tadeo RN  Outcome: Progressing  Flowsheets (Taken 7/30/2022 1002)  Discharge to home or other facility with appropriate resources: Identify barriers to discharge with patient and caregiver  7/30/2022 0618 by Matthew Rawls RN  Outcome: Progressing  Flowsheets (Taken 7/29/2022 1958)  Discharge to home or other facility with appropriate resources:   Refer to discharge planning if patient needs post-hospital services based on physician order or complex needs related to functional status, cognitive ability or social support system   Identify barriers to discharge with patient and caregiver     Problem: Pain  Goal: Verbalizes/displays adequate comfort level or baseline comfort level  7/30/2022 1724 by Ben Tadeo RN  Outcome: Progressing  Flowsheets (Taken 7/30/2022 0945)  Verbalizes/displays adequate comfort level or baseline comfort level: Assess pain using appropriate pain scale  7/30/2022 0618 by Matthew Rawls RN  Outcome: Progressing     Problem: Skin/Tissue Integrity  Goal: Absence of new skin breakdown  Description: 1. Monitor for areas of redness and/or skin breakdown  2. Assess vascular access sites hourly  3. Every 4-6 hours minimum:  Change oxygen saturation probe site  4. Every 4-6 hours:  If on nasal continuous positive airway pressure, respiratory therapy assess nares and determine need for appliance change or resting period.   7/30/2022 1724 by Ben Tadeo RN  Outcome: Progressing  7/30/2022 0618 by Matthew Rawls RN  Outcome: Progressing     Problem: ABCDS Injury Assessment  Goal: Absence of physical injury  7/30/2022 1724 by Ben Tadeo RN  Outcome: Progressing  7/30/2022 0618 by Matthew Rawls RN  Outcome: Progressing     Problem: Safety - Adult  Goal: Free from fall injury  7/30/2022 1724 by Ben Tadeo RN  Outcome: Progressing  7/30/2022 0618 by Matthew Rawls RN  Outcome: Progressing Problem: Hematologic - Adult  Goal: Maintains hematologic stability  7/30/2022 1724 by Julio Peralta RN  Outcome: Progressing  Flowsheets (Taken 7/30/2022 1002)  Maintains hematologic stability: Assess for signs and symptoms of bleeding or hemorrhage  7/30/2022 0618 by Mendy Cordero RN  Outcome: Progressing  Flowsheets (Taken 7/29/2022 1915)  Maintains hematologic stability: Monitor labs for bleeding or clotting disorders

## 2022-07-31 LAB
HCT VFR BLD CALC: 26.4 % (ref 36–48)
HCT VFR BLD CALC: 26.9 % (ref 36–48)
HCT VFR BLD CALC: 30 % (ref 36–48)
HEMOGLOBIN: 8.3 G/DL (ref 12–16)
HEMOGLOBIN: 8.6 G/DL (ref 12–16)
HEMOGLOBIN: 9.3 G/DL (ref 12–16)

## 2022-07-31 PROCEDURE — G0378 HOSPITAL OBSERVATION PER HR: HCPCS

## 2022-07-31 PROCEDURE — 85014 HEMATOCRIT: CPT

## 2022-07-31 PROCEDURE — 1200000000 HC SEMI PRIVATE

## 2022-07-31 PROCEDURE — 96376 TX/PRO/DX INJ SAME DRUG ADON: CPT

## 2022-07-31 PROCEDURE — 6370000000 HC RX 637 (ALT 250 FOR IP): Performed by: INTERNAL MEDICINE

## 2022-07-31 PROCEDURE — 97530 THERAPEUTIC ACTIVITIES: CPT

## 2022-07-31 PROCEDURE — 6370000000 HC RX 637 (ALT 250 FOR IP): Performed by: NURSE PRACTITIONER

## 2022-07-31 PROCEDURE — 2580000003 HC RX 258: Performed by: INTERNAL MEDICINE

## 2022-07-31 PROCEDURE — 85018 HEMOGLOBIN: CPT

## 2022-07-31 PROCEDURE — 97166 OT EVAL MOD COMPLEX 45 MIN: CPT

## 2022-07-31 PROCEDURE — C9113 INJ PANTOPRAZOLE SODIUM, VIA: HCPCS | Performed by: INTERNAL MEDICINE

## 2022-07-31 PROCEDURE — 97116 GAIT TRAINING THERAPY: CPT

## 2022-07-31 PROCEDURE — 97535 SELF CARE MNGMENT TRAINING: CPT

## 2022-07-31 PROCEDURE — 36415 COLL VENOUS BLD VENIPUNCTURE: CPT

## 2022-07-31 PROCEDURE — 6360000002 HC RX W HCPCS: Performed by: INTERNAL MEDICINE

## 2022-07-31 PROCEDURE — 97162 PT EVAL MOD COMPLEX 30 MIN: CPT

## 2022-07-31 RX ORDER — PANTOPRAZOLE SODIUM 40 MG/1
40 TABLET, DELAYED RELEASE ORAL
Status: DISCONTINUED | OUTPATIENT
Start: 2022-07-31 | End: 2022-08-02 | Stop reason: HOSPADM

## 2022-07-31 RX ADMIN — CIPROFLOXACIN 1 DROP: 3 SOLUTION OPHTHALMIC at 19:12

## 2022-07-31 RX ADMIN — Medication 400 MG: at 08:25

## 2022-07-31 RX ADMIN — SUCRALFATE 1 G: 1 TABLET ORAL at 07:02

## 2022-07-31 RX ADMIN — CIPROFLOXACIN 1 DROP: 3 SOLUTION OPHTHALMIC at 22:57

## 2022-07-31 RX ADMIN — ZOLPIDEM TARTRATE 5 MG: 5 TABLET ORAL at 22:57

## 2022-07-31 RX ADMIN — FLUTICASONE PROPIONATE 2 SPRAY: 50 SPRAY, METERED NASAL at 21:17

## 2022-07-31 RX ADMIN — SODIUM CHLORIDE: 9 INJECTION, SOLUTION INTRAVENOUS at 02:31

## 2022-07-31 RX ADMIN — LEVOTHYROXINE SODIUM 150 MCG: 0.15 TABLET ORAL at 06:17

## 2022-07-31 RX ADMIN — TROSPIUM CHLORIDE 20 MG: 20 TABLET, FILM COATED ORAL at 21:16

## 2022-07-31 RX ADMIN — LOSARTAN POTASSIUM 50 MG: 25 TABLET, FILM COATED ORAL at 08:25

## 2022-07-31 RX ADMIN — LOSARTAN POTASSIUM 50 MG: 25 TABLET, FILM COATED ORAL at 21:16

## 2022-07-31 RX ADMIN — Medication 10 ML: at 21:17

## 2022-07-31 RX ADMIN — OXYMETAZOLINE HCL 2 SPRAY: 0.05 SPRAY NASAL at 21:18

## 2022-07-31 RX ADMIN — SODIUM CHLORIDE 8 MG/HR: 9 INJECTION, SOLUTION INTRAVENOUS at 10:21

## 2022-07-31 RX ADMIN — SUCRALFATE 1 G: 1 TABLET ORAL at 10:19

## 2022-07-31 RX ADMIN — SUCRALFATE 1 G: 1 TABLET ORAL at 19:12

## 2022-07-31 RX ADMIN — CIPROFLOXACIN 1 DROP: 3 SOLUTION OPHTHALMIC at 06:18

## 2022-07-31 RX ADMIN — METOPROLOL SUCCINATE 25 MG: 25 TABLET, EXTENDED RELEASE ORAL at 08:25

## 2022-07-31 RX ADMIN — METOPROLOL SUCCINATE 25 MG: 25 TABLET, EXTENDED RELEASE ORAL at 21:16

## 2022-07-31 RX ADMIN — PANTOPRAZOLE SODIUM 40 MG: 40 TABLET, DELAYED RELEASE ORAL at 14:40

## 2022-07-31 RX ADMIN — CIPROFLOXACIN 1 DROP: 3 SOLUTION OPHTHALMIC at 14:40

## 2022-07-31 RX ADMIN — LEFLUNOMIDE 20 MG: 10 TABLET ORAL at 08:26

## 2022-07-31 NOTE — PROGRESS NOTES
Occupational Therapy  Facility/Department: [facility]  Occupational Therapy Initial Assessment    Name: Quan Landaverde  : 1927  MRN: [id number]  Date of Service: 2022    Discharge Recommendations:  24 hour supervision or assist, Home with Home health OT          Patient Diagnosis(es): The primary encounter diagnosis was Melena. Diagnoses of Anemia, unspecified type and Gastrointestinal hemorrhage, unspecified gastrointestinal hemorrhage type were also pertinent to this visit. Past Medical History:  has a past medical history of Arthritis, Arthritis, CAD (coronary artery disease), GERD (gastroesophageal reflux disease), Hyperlipidemia, Hypertension, and Thyroid disease. Past Surgical History:  has a past surgical history that includes joint replacement; Cholecystectomy; Coronary angioplasty with stent; and pr esophagogastroduodenoscopy transoral diagnostic (N/A, 2018). Assessment   Performance deficits / Impairments: Decreased functional mobility ; Decreased balance;Decreased ADL status; Decreased endurance;Decreased strength  Patient admitted from home, uses B crutches and R AFO, does receive help from spouse and aide, states she can perform own ADLs however it does take along time. Today patient CGA/Yareli with axillary crutches, totalA to tegan shoes. After evaluation, pt found to be presenting with the above mentioned occupational performance deficits which are affecting participation in daily living skills. Pt would benefit from continued skilled occupational therapy to address ADLs, functional mobility, and safety while in acute care.   Prognosis: Good  Decision Making: Medium Complexity  REQUIRES OT FOLLOW-UP: Yes  Activity Tolerance  Activity Tolerance: Patient Tolerated treatment well        Plan   Plan  Times per Week: 3-5x's a week while in acute care     Restrictions  Restrictions/Precautions  Restrictions/Precautions: Up as Tolerated, Fall Risk  Required Braces or orthopedic shoe LLE, AFO with orthopedic shoe RLE; Per patient fx her femur  in Feb. 2022       Objective   Pain: denies  Vitals: bp 158/89, O2 95% on RA, HR 87 bpm          Observation/Palpation  Posture: Fair  Body Mechanics: R ankle deformity, pt reports baseline uses orthopedica shoes and AFO  Safety Devices  Type of Devices: Gait belt;Call light within reach;Nurse notified; Left in chair;Chair alarm in place    Bed Mobility Training  Bed Mobility Training: Yes  Supine to Sit: Stand-by assistance; Additional time (to R with HOB elevated)    Balance  Sitting: Intact  Standing: Impaired  Standing - Dynamic: Constant support    Transfer Training  Transfer Training: Yes  Overall Level of Assistance: Minimum assistance  Sit to Stand: Minimum assistance;Assist X1 (up to crutches)  Stand to Sit: Minimum assistance  Bed to Chair: Minimum assistance (with crutches)    Gait  Assistive Device: Crutches CGA in room     AROM: Within functional limits  Strength: Within functional limits  Coordination: Within functional limits  Tone: Normal  Sensation: Intact         Vision  Vision: Impaired  Vision Exceptions: Wears glasses at all times  Hearing  Hearing: Exceptions to [facility]  Hearing Exceptions: Hard of hearing/hearing concerns ([place] in R ear)    Cognition  Overall Cognitive Status: WFL  Orientation  Overall Orientation Status: Within Functional Limits        Education Given To: Patient  Education Provided: Role of Therapy; ADL Adaptive Strategies; Plan of Care;Transfer Training;Equipment; Fall Prevention Strategies  Education Method: Demonstration;Verbal  Barriers to Learning: None  Education Outcome: Verbalized understanding;Demonstrated understanding;Continued education needed              AM-PAC Score        AM-PAC Inpatient Daily Activity Raw Score: 14 (07/31/22 1033)  AM-PAC Inpatient ADL T-Scale Score : 33.39 (07/31/22 1033)  ADL Inpatient CMS 0-100% Score: 59.67 (07/31/22 1033)  ADL Inpatient CMS G-Code Modifier : CK (07/31/22 1033)       Goals  Short Term Goals  Time Frame for Short term goals: 1 week 8/7  Short Term Goal 1: Pt will complete toilet transfers with SBA  Short Term Goal 2: Pt will complete standing level ADLs with SBA for balance  Short Term Goal 3: Pt will complete grooming task with set-up by 8/4  Patient Goals   Patient goals : \"to go home\"       Therapy Time   Individual Concurrent Group Co-treatment   Time In 0850         Time Out 0930         Minutes 40         Timed Code Treatment Minutes: 30 Minutes       SUE Renner/L  If pt is unable to be seen after this session, please let this note serve as discharge summary. Please see case management note for discharge disposition. Thank you.

## 2022-07-31 NOTE — PROGRESS NOTES
Hospitalist Progress Note      PCP: Homa Schaffer MD    Date of Admission: 7/29/2022    Chief Complaint: GI bleed    Hospital Course:   \80 y.o. female who presented to [facility] with above complaints  Patient with PMH of GERD, history of GI bleed due to gastric antral ulcers and Wilbur lesions, HTN, HLD, Hx of DVT on Eliquis, thyroid disease presented to the ED today with abnormal labs. Patient reports her rheumatologist from [facility] had ordered labs on 7/20 which included a CBC. That showed a lower hemoglobin of 8.3. Back in January 2022 her hemoglobin was 12.0. She saw her PCP on 7/26 and had repeat labs done which showed a hemoglobin of 7.9. Today family reported that they saw some blood in her stools. They were asked to come to the ED for further management. Patient reports she usually does not visualize her stools after a bowel movement so she does not know if she was having any hematochezia or melena. She denied any abdominal pain or bloody vomiting. She takes Eliquis every day for history of DVT. Denied any NSAID use. She reportedly has doubled her Protonix and is taking some iron supplements as well. Back in 2018 November she underwent EGD for GI bleed here at [facility] which showed 2 small 6 mm white-based antral ulcers, gastritis and HH with Wilbur erosions. She reports her sulfasalazine was stopped by her rheumatologist.     Subjective:   Patient denies any abdominal pain. She denies any bleeding. Patient is concerned about a drop in her hemoglobin. She believes she should stay another day to monitor her hemoglobin.     Medications:  Reviewed    Infusion Medications    sodium chloride      sodium chloride      sodium chloride       Scheduled Medications    pantoprazole  40 mg Oral BID AC    ciprofloxacin  1 drop Both Eyes 4 times per day    sucralfate  1 g Oral 4 times per day    leflunomide  20 mg Oral Daily    levothyroxine  150 mcg Oral Daily    magnesium oxide  400 mg Oral Daily    trospium  20 mg Oral Nightly    sodium chloride flush  5-40 mL IntraVENous 2 times per day    losartan  50 mg Oral BID    metoprolol succinate  25 mg Oral BID    fluticasone  2 spray Each Nostril Nightly     PRN Meds: sodium chloride, HYDROcodone-acetaminophen, zolpidem, sodium chloride flush, sodium chloride, ondansetron **OR** ondansetron, acetaminophen **OR** acetaminophen, oxymetazoline      Intake/Output Summary (Last 24 hours) at 7/31/2022 1434  Last data filed at 7/31/2022 1649  Gross per 24 hour   Intake 2116.78 ml   Output 850 ml   Net 1266.78 ml         Physical Exam Performed:    BP (!) 169/87   Pulse 72   Temp 97.9 °F (36.6 °C) (Oral)   Resp 18   Ht 5' 2\" (1.575 m)   Wt 160 lb (72.6 kg)   SpO2 92%   BMI 29.26 kg/m²     General appearance: No apparent distress, appears stated age and cooperative. HEENT: Pupils equal, round, and reactive to light. Conjunctivae/corneas clear. Neck: Supple, with full range of motion. No jugular venous distention. Trachea midline. Respiratory:  Normal respiratory effort. Clear to auscultation, bilaterally without Rales/Wheezes/Rhonchi. Cardiovascular: Regular rate and rhythm with normal S1/S2 without murmurs, rubs or gallops. Abdomen: Soft, non-tender, non-distended with normal bowel sounds. Musculoskeletal: No clubbing, cyanosis or edema bilaterally. Full range of motion without deformity. Skin: Skin color, texture, turgor normal.  No rashes or lesions. Neurologic:  Neurovascularly intact without any focal sensory/motor deficits.  Cranial nerves: II-XII intact, grossly non-focal.  Psychiatric: Alert and oriented, thought content appropriate, normal insight  Capillary Refill: Brisk,3 seconds, normal   Peripheral Pulses: +2 palpable, equal bilaterally       Labs:   Recent Labs     07/29/22  1450 07/29/22  1624 07/30/22  0010 07/30/22  1809 07/30/22  2359 07/31/22  0648   WBC 6.1 4.9  --   --   --   --    HGB 8.3* 7.5*   < > 9.1* 8.3* 8.6*   HCT 26.4* 23.9*   < > 28.9* 26.4* 26.9*    214  --   --   --   --     < > = values in this interval not displayed. Recent Labs     07/29/22  1450      K 4.3      CO2 26   BUN 22*   CREATININE 0.7   CALCIUM 9.7       Recent Labs     07/29/22  1450   AST 23   ALT 12   BILITOT <0.2   ALKPHOS 98       Recent Labs     07/29/22  1450   INR 1.16*       No results for input(s): Christen Wilson in the last 72 hours. Urinalysis:      Lab Results   Component Value Date/Time    NITRU POSITIVE 05/28/2019 03:20 PM    WBCUA 0-2 05/28/2019 03:20 PM    BACTERIA 4+ 05/28/2019 03:20 PM    RBCUA 3-5 05/28/2019 03:20 PM    BLOODU TRACE-LYSED 05/28/2019 03:20 PM    SPECGRAV <=1.005 05/28/2019 03:20 PM    GLUCOSEU Negative 05/28/2019 03:20 PM       Radiology:  No orders to display           Assessment/Plan:    Active Hospital Problems    Diagnosis     Acute GI hemorrhage [K92.2]      Priority: Medium    History of DVT (deep vein thrombosis) [Z86.718]      Priority: Medium    CHANDRIKA on CPAP [G47.33, Z99.89]      Priority: Medium    Wilbur lesion, 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 Acute blood loss anemia. Possibly secondary to ulcers. Patient received transfusion earlier. Will monitor hemoglobin closely. If hemoglobin is stable tomorrow we will consider discharge. 2.  DVT. Patient on Eliquis will hold Eliquis she may need this held for couple days until she follows up with her physician. 3.  Rheumatoid arthritis. Continue current medications patient will follow with rheumatologist as scheduled. 4.  Hypothyroidism.   We will continue Synthroid monitor closely. DVT Prophylaxis: AC on hold due to bleed  Diet: ADULT DIET; Regular  Code Status: Full Code    PT/OT Eval Status: Pending    Dispo -Home when stable possibly in a.m.     Korin Khan MD

## 2022-07-31 NOTE — PROGRESS NOTES
Physical Therapy  Facility/Department: [place] [address] - MED SURG  Physical Therapy Initial Assessment/Treatment    Name: Zack Cortez  : 1927  MRN: [id number]  Date of Service: 2022    Discharge Recommendations:  24 hour supervision or assist, Home with Home health PT          Patient Diagnosis(es): The primary encounter diagnosis was Melena. Diagnoses of Anemia, unspecified type and Gastrointestinal hemorrhage, unspecified gastrointestinal hemorrhage type were also pertinent to this visit. Past Medical History:  has a past medical history of Arthritis, Arthritis, CAD (coronary artery disease), GERD (gastroesophageal reflux disease), Hyperlipidemia, Hypertension, and Thyroid disease. Past Surgical History:  has a past surgical history that includes joint replacement; Cholecystectomy; Coronary angioplasty with stent; and pr esophagogastroduodenoscopy transoral diagnostic (N/A, 2018). Assessment   Body Structures, Functions, Activity Limitations Requiring Skilled Therapeutic Intervention: Decreased functional mobility ; Decreased endurance;Decreased ROM; Decreased balance;Decreased posture;Decreased strength  Assessment: Pt is a 80 y.o. female admitted to [facility] secondary to GI bleed. Pt lives with  in two story home with 5 MC and reports she is typically MI with functional mobility and gait with axillary crutches. Pt is currently functioning slighlty below her baseline requiring CGA for bed mobility, CGA to Yareli for t/f and CGA for gait x 40' with axillary crutches. Pt is limited by fatigue and decreased activity tolerance. Pt will benefit from continued skilled PT in acute care setting to address above deficits. Recommend pt d/c home with 24hrS and HHPT.   Treatment Diagnosis: Impaired balance and gait  Specific Instructions for Next Treatment: Progress mobility as tolerated  Therapy Prognosis: Good  Decision Making: Medium Complexity  Barriers to Learning: none  Requires PT Follow-Up: Yes  Activity Tolerance  Activity Tolerance: Patient tolerated evaluation without incident;Patient limited by fatigue;Patient limited by pain; Patient limited by endurance     Plan   Plan  Plan: 3-5 times per week  Specific Instructions for Next Treatment: Progress mobility as tolerated  Current Treatment Recommendations: Strengthening, Balance training, Gait training, Functional mobility training, Stair training, Positioning, Neuromuscular re-education, Transfer training, Home exercise program, Safety education & training, Patient/Caregiver education & training, Therapeutic activities, Endurance training  Safety Devices  Type of Devices: Gait belt, Call light within reach, Nurse notified, Left in chair, Chair alarm in place, Patient at risk for falls, All fall risk precautions in place     Restrictions  Restrictions/Precautions  Restrictions/Precautions: Up as Tolerated, Fall Risk  Required Braces or Orthoses?: Yes  Required Braces or Orthoses  Right Lower Extremity Brace: Ankle Foot Orthotics  Position Activity Restriction  Other position/activity restrictions: Orthopedic shoes, purewick     Subjective   Pain: denies: Vitals: bp 158/89, O2 95% on RA, HR 87 bpm  General  Chart Reviewed: Yes  Patient assessed for rehabilitation services?: Yes  Additional Pertinent Hx: Per Dr. Rissa Wagoner H&P \"PMH of GERD, history of GI bleed due to gastric antral ulcers and Wilbur lesions, HTN, HLD, Hx of DVT on Eliquis, thyroid disease presented to the ED today with abnormal labs. Patient reports her rheumatologist from [facility] had ordered labs on 7/20 which included a CBC.   That showed a lower hemoglobin of 8.3\"  Response To Previous Treatment: Not applicable  Family / Caregiver Present: No  Referring Practitioner: Chris Aguilar MD  Referral Date : 07/31/22  Diagnosis: GI Bleed  Follows Commands: Within Functional Limits  General Comment  Comments: RN cleared pt for session  Subjective  Subjective: Pt resting in bed on approach, pleasant and agreeable to PT tx         Social/Functional History  Social/Functional History  Lives With: Spouse  Type of Home: House  Home Layout: Two level (stair lift to 2nd floor)  Home Access: Stairs to enter with rails  Entrance Stairs - Number of Steps: 5 MC  Entrance Stairs - Rails: Both (too wide to use; uses crutch on oneside and hand rail on the other)  Bathroom Shower/Tub: Tub/Shower unit (walkin tube with door and seat)  Bathroom Toilet: Standard  Bathroom Equipment: Grab bars in shower  Home Equipment: Crutches, [address] Help From: Home health (8:30-12 and 4-8pm)  ADL Assistance: Needs assistance  Bath: Moderate assistance  Dressing:  Moderate assistance (spouse assists with bathing and lower body dressing; pt states she can get herself dressed it just takes awhile)  Grooming: Independent  Feeding: Independent  Toileting: Independent  Homemaking Responsibilities: Yes  Meal Prep Responsibility: Secondary (Pt states she could but  does it)  Laundry Responsibility: No ( or aide)  Cleaning Responsibility: No  Shopping Responsibility: No  Ambulation Assistance: Independent (with crutches)  Transfer Assistance: Independent  Active : No  Occupation: Retired  Type of Occupation: 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 2022  Vision/Hearing  Vision  Vision: Impaired  Vision Exceptions: Wears glasses at all times  Hearing  Hearing: Exceptions to [facility]   Orientation  Overall Orientation Status: Within Functional Limits     Objective   Heart Rate: 72  Heart Rate Source: Monitor  BP: (!) 169/87  BP Location: Right upper arm  BP Method: Automatic  Patient Position: Semi fowlers  MAP (Calculated): 114.33  Resp: 18  SpO2: 92 %  O2 Device: None (Room air) Observation/Palpation  Posture: Fair  Body Mechanics: R ankle deformity, pt reports baseline uses orthopedica shoes and AFO  Gross Assessment  AROM: Generally decreased, functional  PROM: Generally decreased, functional  Strength: Generally decreased, functional                Bed mobility  Supine to Sit: Contact guard assistance (HOB elevated, use of BR, increased time to complete)  Sit to Supine: Unable to assess (Pt seated in recliner at end of session)    Transfers  Sit to Stand: Contact guard assistance;Minimal Assistance  Stand to sit: Contact guard assistance;Minimal Assistance  Comment: CGA to Yareli EOB and recliner to axillary crutches, increased time to complete and cues for anterior WS    Ambulation  Surface: level tile  Device: Axillary Crutches  Other Apparatus: AFO (R AFO, B orthopedic shoes)  Assistance: Contact guard assistance  Quality of Gait: 4 point gait pattern, B decreased toe clearance and heel strike, B decreased WS, forward flexed trunk, increased UE support. Pt without overt LOB. Gait Deviations: Slow Nati; Increased ASIA; Decreased step length;Decreased step height;Shuffles  Distance: 5' + 40'  Comments: Distances limited by fatigue and SOB with seated rest break to recover. Balance  Posture: Fair  Sitting - Static: Good  Sitting - Dynamic: Good;-  Standing - Static: Fair;+  Standing - Dynamic: Fair  Comments: Grossly CGA with axillary crutches.              AM-PAC Score  AM-PAC Inpatient Mobility Raw Score : 17 (07/31/22 1334)  AM-PAC Inpatient T-Scale Score : 42.13 (07/31/22 1334)  Mobility Inpatient CMS 0-100% Score: 50.57 (07/31/22 1334)  Mobility Inpatient CMS G-Code Modifier : CK (07/31/22 1334)            Goals  Short Term Goals  Time Frame for Short term goals: 1 week 8/07/22 (unless otherwise specified)  Short term goal 1: Pt will complete supine to/from sit with S  Short term goal 2: Pt will complete sit to/from stand with axillary crutches with S  Short term goal 3: Pt will ambulate >50' with axillary crutches with S without LOB  Short term goal 4: Pt will ascend/descned 5 steps with HR and LRAD with CGA without LOB  Short term goal 5: 8/04/22: Pt will participate in 12-15 reps BLE TE to improve strength and increase I with functional mobility and gait  Patient Goals   Patient goals : \"Be able to walk\"       Education  Patient Education  Education Given To: Patient  Education Provided: Role of Therapy;Plan of Care;Precautions;Transfer Training  Education Provided Comments: Educated in importance of OOB mobility and gait, safe use of AD and progression of mobility  Education Method: Demonstration;Verbal  Barriers to Learning: None  Education Outcome: Demonstrated understanding;Verbalized understanding      Therapy Time   Individual Concurrent Group Co-treatment   Time In 0850         Time Out 0930         Minutes 40         Timed Code Treatment Minutes: 30 Minutes (10 minutes for eval)       If pt is unable to be seen after this session, please let this note serve as discharge summary. Please see case management note for discharge disposition. Thank you.     Belinda Haque, PT, DPT

## 2022-08-01 LAB
ANION GAP SERPL CALCULATED.3IONS-SCNC: 9 MMOL/L (ref 3–16)
BUN BLDV-MCNC: 16 MG/DL (ref 7–20)
CALCIUM SERPL-MCNC: 9.1 MG/DL (ref 8.3–10.6)
CHLORIDE BLD-SCNC: 104 MMOL/L (ref 99–110)
CO2: 26 MMOL/L (ref 21–32)
CREAT SERPL-MCNC: <0.5 MG/DL (ref 0.6–1.2)
GFR AFRICAN AMERICAN: >60
GFR NON-AFRICAN AMERICAN: >60
GLUCOSE BLD-MCNC: 109 MG/DL (ref 70–99)
HCT VFR BLD CALC: 27.2 % (ref 36–48)
HEMOGLOBIN: 8.7 G/DL (ref 12–16)
POTASSIUM REFLEX MAGNESIUM: 3.8 MMOL/L (ref 3.5–5.1)
SODIUM BLD-SCNC: 139 MMOL/L (ref 136–145)

## 2022-08-01 PROCEDURE — 1200000000 HC SEMI PRIVATE

## 2022-08-01 PROCEDURE — 2580000003 HC RX 258: Performed by: INTERNAL MEDICINE

## 2022-08-01 PROCEDURE — 36415 COLL VENOUS BLD VENIPUNCTURE: CPT

## 2022-08-01 PROCEDURE — 97530 THERAPEUTIC ACTIVITIES: CPT

## 2022-08-01 PROCEDURE — 6370000000 HC RX 637 (ALT 250 FOR IP): Performed by: INTERNAL MEDICINE

## 2022-08-01 PROCEDURE — G0378 HOSPITAL OBSERVATION PER HR: HCPCS

## 2022-08-01 PROCEDURE — 85014 HEMATOCRIT: CPT

## 2022-08-01 PROCEDURE — 85018 HEMOGLOBIN: CPT

## 2022-08-01 PROCEDURE — 97535 SELF CARE MNGMENT TRAINING: CPT

## 2022-08-01 PROCEDURE — 80048 BASIC METABOLIC PNL TOTAL CA: CPT

## 2022-08-01 PROCEDURE — 6370000000 HC RX 637 (ALT 250 FOR IP): Performed by: NURSE PRACTITIONER

## 2022-08-01 RX ORDER — MINERAL OIL AND WHITE PETROLATUM 150; 830 MG/G; MG/G
OINTMENT OPHTHALMIC PRN
Status: DISCONTINUED | OUTPATIENT
Start: 2022-08-01 | End: 2022-08-01 | Stop reason: RX

## 2022-08-01 RX ORDER — CARBOXYMETHYLCELLULOSE SODIUM 10 MG/ML
1 GEL OPHTHALMIC PRN
Status: DISCONTINUED | OUTPATIENT
Start: 2022-08-01 | End: 2022-08-02 | Stop reason: HOSPADM

## 2022-08-01 RX ADMIN — Medication 10 ML: at 20:54

## 2022-08-01 RX ADMIN — TROSPIUM CHLORIDE 20 MG: 20 TABLET, FILM COATED ORAL at 20:52

## 2022-08-01 RX ADMIN — HYDROCODONE BITARTRATE AND ACETAMINOPHEN 1 TABLET: 5; 325 TABLET ORAL at 07:03

## 2022-08-01 RX ADMIN — SUCRALFATE 1 G: 1 TABLET ORAL at 06:30

## 2022-08-01 RX ADMIN — SUCRALFATE 1 G: 1 TABLET ORAL at 00:28

## 2022-08-01 RX ADMIN — PANTOPRAZOLE SODIUM 40 MG: 40 TABLET, DELAYED RELEASE ORAL at 07:45

## 2022-08-01 RX ADMIN — FLUTICASONE PROPIONATE 2 SPRAY: 50 SPRAY, METERED NASAL at 20:54

## 2022-08-01 RX ADMIN — LOSARTAN POTASSIUM 50 MG: 25 TABLET, FILM COATED ORAL at 20:53

## 2022-08-01 RX ADMIN — SUCRALFATE 1 G: 1 TABLET ORAL at 12:27

## 2022-08-01 RX ADMIN — CARBOXYMETHYLCELLULOSE SODIUM 1 DROP: 10 GEL OPHTHALMIC at 18:23

## 2022-08-01 RX ADMIN — CIPROFLOXACIN 1 DROP: 3 SOLUTION OPHTHALMIC at 06:30

## 2022-08-01 RX ADMIN — Medication 10 ML: at 07:47

## 2022-08-01 RX ADMIN — PANTOPRAZOLE SODIUM 40 MG: 40 TABLET, DELAYED RELEASE ORAL at 18:20

## 2022-08-01 RX ADMIN — METOPROLOL SUCCINATE 25 MG: 25 TABLET, EXTENDED RELEASE ORAL at 07:44

## 2022-08-01 RX ADMIN — METOPROLOL SUCCINATE 25 MG: 25 TABLET, EXTENDED RELEASE ORAL at 20:54

## 2022-08-01 RX ADMIN — CARBOXYMETHYLCELLULOSE SODIUM 1 DROP: 10 GEL OPHTHALMIC at 20:54

## 2022-08-01 RX ADMIN — LOSARTAN POTASSIUM 50 MG: 25 TABLET, FILM COATED ORAL at 07:45

## 2022-08-01 RX ADMIN — LEFLUNOMIDE 20 MG: 10 TABLET ORAL at 07:44

## 2022-08-01 RX ADMIN — SUCRALFATE 1 G: 1 TABLET ORAL at 18:20

## 2022-08-01 RX ADMIN — Medication 400 MG: at 07:45

## 2022-08-01 RX ADMIN — SUCRALFATE 1 G: 1 TABLET ORAL at 23:00

## 2022-08-01 RX ADMIN — LEVOTHYROXINE SODIUM 150 MCG: 0.15 TABLET ORAL at 07:44

## 2022-08-01 RX ADMIN — CARBOXYMETHYLCELLULOSE SODIUM 1 DROP: 10 GEL OPHTHALMIC at 12:27

## 2022-08-01 ASSESSMENT — PAIN SCALES - GENERAL
PAINLEVEL_OUTOF10: 6
PAINLEVEL_OUTOF10: 0
PAINLEVEL_OUTOF10: 0
PAINLEVEL_OUTOF10: 3

## 2022-08-01 ASSESSMENT — PAIN DESCRIPTION - LOCATION: LOCATION: BACK

## 2022-08-01 ASSESSMENT — PAIN - FUNCTIONAL ASSESSMENT: PAIN_FUNCTIONAL_ASSESSMENT: ACTIVITIES ARE NOT PREVENTED

## 2022-08-01 NOTE — PROGRESS NOTES
Per Dr. Zoya Waterman, pt. Ok to d/c, f/u with GI in 1 week. Restart eliquis, hold asa and potassium until pt. Follows up with PCP or GI.  Notified hospitalist.

## 2022-08-01 NOTE — PROGRESS NOTES
Occupational Therapy  Facility/Department: [place] [address] - MED SURG  Occupational Therapy Daily Treatment Note    Name: Yoseph Dunaway  : 1927  MRN: [id number]  Date of Service: 2022    Discharge Recommendations:  24 hour supervision or assist, Home with Home health OT      Patient Diagnosis(es): The primary encounter diagnosis was Melena. Diagnoses of Anemia, unspecified type and Gastrointestinal hemorrhage, unspecified gastrointestinal hemorrhage type were also pertinent to this visit. Past Medical History:  has a past medical history of Arthritis, Arthritis, CAD (coronary artery disease), GERD (gastroesophageal reflux disease), Hyperlipidemia, Hypertension, and Thyroid disease. Past Surgical History:  has a past surgical history that includes joint replacement; Cholecystectomy; Coronary angioplasty with stent; pr esophagogastroduodenoscopy transoral diagnostic (N/A, 2018); and Upper gastrointestinal endoscopy (N/A, 2022). Assessment   Performance deficits / Impairments: Decreased functional mobility ; Decreased balance;Decreased ADL status; Decreased endurance;Decreased strength  Assessment: Pt seen for ADLs and mobility by OT today. She was min A x1 for standing balance and transfers with crutches and R AFO. Total assist needed for LE dressing for orthotic shoes, hose and AFO--pt has assist at baseline. Pt demonstrated improved activity tolerance today. Increased time needed during transitional movements d/t report of dizziness. BP was 149/74 when seated. Cont OT in acute care. Prognosis: Fair  Activity Tolerance  Activity Tolerance: Patient Tolerated treatment well        Plan   Plan  Times per Week: 3-5x's a week while in acute care     Restrictions  Restrictions/Precautions  Restrictions/Precautions: Up as Tolerated, Fall Risk  Required Braces or Orthoses?: Yes  Required Braces or Orthoses  Right Lower Extremity Brace:  Ankle Foot Orthotics  Position Activity Restriction  Other position/activity restrictions: Orthopedic shoes, purewick    Subjective   General  Chart Reviewed: Yes, Orders, Progress Notes, History and Physical, Previous Admission  Patient assessed for rehabilitation services?: Yes  Family / Caregiver Present: Yes (daughter)  Referring Practitioner: Kamini Mcqueen MD 7/31/22  Diagnosis: acute GI hemorrhage  Subjective  Subjective: Pt agreeable to OT. Denies pain. She reports that she had pain meds earlier today d/t back pain. General Comment  Comments: RN approved therapy     Objective   Heart Rate: 83  Heart Rate Source: Monitor  BP: 130/62  BP Location: Right upper arm  BP Method: Automatic  Patient Position: Semi fowlers  MAP (Calculated): 84.67  Resp: 16  SpO2: 90 %  O2 Device: None (Room air)        Safety Devices  Type of Devices: Gait belt;Call light within reach;Nurse notified; Left in chair;Chair alarm in place; Patient at risk for falls; All fall risk precautions in place      ADL  Feeding: Setup  Grooming: Minimal assistance  LE Bathing: Dependent/Total  UE Dressing: Moderate assistance  UE Dressing Skilled Clinical Factors: changed gown  LE Dressing: Maximum assistance  LE Dressing Skilled Clinical Factors: elastic stockings, R AFO and shoes  Toileting: Dependent/Total  Toileting Skilled Clinical Factors: purewick   Bed mobility  Supine to Sit: Contact guard assistance (HOB elevated, use of siderail, increased time to complete)  Transfers  Stand Pivot Transfers: Minimal assistance (crutches)  Sit to stand: Minimal assistance (from EOB)  Stand to sit: Minimal assistance (to control descent into recliner)  Transfer Comments: Ambulated around bed to recliner with crutches and increased time to complete. Cognition  Overall Cognitive Status: WFL  Orientation  Overall Orientation Status: Within Functional Limits          Education Given To: Patient; Family  Education Provided: Role of Therapy;Transfer Training;Equipment;Plan of Care;ADL Adaptive Strategies  Education Method: Verbal  Education Outcome: Verbalized understanding;Continued education needed   Disease Specific Education: Pt educated on importance of OOB mobility, prevention of complications of bedrest, and general safety during hospitalization. Pt verbalized understanding  AM-PAC Score   AM-PAC Inpatient Daily Activity Raw Score: 13 (08/01/22 1156)  AM-PAC Inpatient ADL T-Scale Score : 32.03 (08/01/22 1156)  ADL Inpatient CMS 0-100% Score: 63.03 (08/01/22 1156)  ADL Inpatient CMS G-Code Modifier : CL (08/01/22 1156)  Goals  Short Term Goals  Time Frame for Short term goals: 1 week 8/7  Short Term Goal 1: Pt will complete toilet transfers with SBA-ongoing 8/1  Short Term Goal 2: Pt will complete standing level ADLs with SBA for balance-ongoing 8/1  Short Term Goal 3: Pt will complete grooming task with set-up by 8/4-ongoing 8/1  Patient Goals   Patient goals : \"to go home\"     Therapy Time   Individual Concurrent Group Co-treatment   Time In 1013         Time Out 1116         Minutes 63         Timed Code Treatment Minutes: 61 Minutes   If pt is discharged prior to next OT session, this note will serve as the discharge summary.   Jaswant Walters OT

## 2022-08-01 NOTE — PROGRESS NOTES
Physician Progress Note      Elisa Van  CSN #:                  [id number]  :                       1927  ADMIT DATE:       2022 2:44 PM  [address] [place] DATE:  RESPONDING  PROVIDER #:        Cj Dumont          QUERY TEXT:    Patient admitted with occult GI blood loss with anemia with noted gastric   ulcers,  and is on chronic anticoagulation. If possible, please document in   the progress notes and discharge summary if you are evaluating and/or treating   any of the following: The medical record reflects the following:  Risk Factors: Eliquis, gastric ulcers  Clinical Indicators: Per H&P \"Suspected chronic occult GI bleed with acute   component\". Per EGD: Two clean based ulcers were noted at the antrum, Rec:   Hold aspirin for 2 weeks, resume Eliquis, BID high dose PPI for 8 weeks,   Carafate 1 gram qid for 4 weeks  Treatment: KCENTRA, Protonix gtt, Eliquis and ASA held, EGD, H&H every 6 hours  Options provided:  -- GIB enhanced by ASA and Eliquis  -- GIB unrelated to anticoagulation  -- Other - I will add my own diagnosis  -- Disagree - Not applicable / Not valid  -- Disagree - Clinically unable to determine / Unknown  -- Refer to Clinical Documentation Reviewer    PROVIDER RESPONSE TEXT:    This patient has GIB enhanced by ASA and Eliquis. Query created by:  Vic Kline on 2022 3:06 PM      Electronically signed by:  Cj Dumont 2022 6:15 PM

## 2022-08-01 NOTE — CARE COORDINATION
CASE MANAGEMENT INITIAL ASSESSMENT    Reviewed chart and completed assessment with patient at bedside  Family present: daughter, Zulay Ingram    Explained Case Management role/services. yes    Primary contact information: spouse, Gene, listed below    Health Care Decision Maker :   Primary Decision Maker: Cory Amador Willow - [phone]    Secondary Decision Maker: Vega Bowles - Sarah - [phone]        Admit date/status: IPA 7/29/22  Diagnosis: Melena     Is this a Readmission?:  No      Insurance:Aetna Medicare     Precert required for SNF: Yes       3 night stay required: No    Living arrangements, Adls, care needs, prior to admission:lives with spouse, IPTA. Does not drive. Family provides transport when needed    Durable Medical Equipment at home:  Walker__Cane__RTS__ BSC__Shower Chair__  02__ HHN__ CPAP_x_  BiPap__  Hospital Bed__ W/C___ crutches    Services in the home and/or outpatient, prior to admission: private duty aides 830am-1200pm and 4pm-8pm. [facility] hospice 2x/week (rep called CM and verified they are active and will resume at dc)    Current PCP: Theodore Wilburn             Medications: Prescription coverage? Yes Will pt require financial assistance with medications No     Transportation needs: family can provide     PT/OT recs:home w/24 hr assist and home pt    Hospital Exemption Notification (HEN):na    Barriers to discharge: none    Plan/comments: pt intends to dc home with resumption of services prior to admission. CM team following.       ECOC on chart for MD zeina Conde, RN

## 2022-08-01 NOTE — PROGRESS NOTES
Hospitalist Progress Note      PCP: Wander Medina MD    Date of Admission: 7/29/2022    Chief Complaint: GI bleed    Hospital Course:   \80 y.o. female who presented to [facility] with above complaints  Patient with PMH of GERD, history of GI bleed due to gastric antral ulcers and Wilbur lesions, HTN, HLD, Hx of DVT on Eliquis, thyroid disease presented to the ED today with abnormal labs. Patient reports her rheumatologist from [facility] had ordered labs on 7/20 which included a CBC. That showed a lower hemoglobin of 8.3. Back in January 2022 her hemoglobin was 12.0. She saw her PCP on 7/26 and had repeat labs done which showed a hemoglobin of 7.9. Today family reported that they saw some blood in her stools. They were asked to come to the ED for further management. Patient reports she usually does not visualize her stools after a bowel movement so she does not know if she was having any hematochezia or melena. She denied any abdominal pain or bloody vomiting. She takes Eliquis every day for history of DVT. Denied any NSAID use. She reportedly has doubled her Protonix and is taking some iron supplements as well. Back in 2018 November she underwent EGD for GI bleed here at [facility] which showed 2 small 6 mm white-based antral ulcers, gastritis and HH with Wilbur erosions. She reports her sulfasalazine was stopped by her rheumatologist.     Subjective:   Patient doing okay. No abdominal pain. No melena or bright red blood per rectum. Family still concerned about slight drop in hemoglobin. They are requesting patient stay another day. GI reevaluated patient.     Medications:  Reviewed    Infusion Medications    sodium chloride      sodium chloride      sodium chloride       Scheduled Medications    pantoprazole  40 mg Oral BID AC    sucralfate  1 g Oral 4 times per day    leflunomide  20 mg Oral Daily    levothyroxine  150 mcg Oral Daily    magnesium oxide  400 mg Oral Daily    trospium  20 mg Oral Nightly    sodium chloride flush  5-40 mL IntraVENous 2 times per day    losartan  50 mg Oral BID    metoprolol succinate  25 mg Oral BID    fluticasone  2 spray Each Nostril Nightly     PRN Meds: carboxymethylcellulose PF, sodium chloride, HYDROcodone-acetaminophen, zolpidem, sodium chloride flush, sodium chloride, ondansetron **OR** ondansetron, acetaminophen **OR** acetaminophen, oxymetazoline      Intake/Output Summary (Last 24 hours) at 8/1/2022 1815  Last data filed at 8/1/2022 1502  Gross per 24 hour   Intake 370 ml   Output 1500 ml   Net -1130 ml         Physical Exam Performed:    /62   Pulse 83   Temp 98.6 °F (37 °C) (Oral)   Resp 16   Ht 5' 2\" (1.575 m)   Wt 160 lb (72.6 kg)   SpO2 90%   BMI 29.26 kg/m²     General appearance: No apparent distress, appears stated age and cooperative. HEENT: Pupils equal, round, and reactive to light. Conjunctivae/corneas clear. Neck: Supple, with full range of motion. No jugular venous distention. Trachea midline. Respiratory:  Normal respiratory effort. Clear to auscultation, bilaterally without Rales/Wheezes/Rhonchi. Cardiovascular: Regular rate and rhythm with normal S1/S2 without murmurs, rubs or gallops. Abdomen: Soft, non-tender, non-distended with normal bowel sounds. Musculoskeletal: No clubbing, cyanosis or edema bilaterally. Full range of motion without deformity. Skin: Skin color, texture, turgor normal.  No rashes or lesions. Neurologic:  Neurovascularly intact without any focal sensory/motor deficits.  Cranial nerves: II-XII intact, grossly non-focal.  Psychiatric: Alert and oriented, thought content appropriate, normal insight  Capillary Refill: Brisk,3 seconds, normal   Peripheral Pulses: +2 palpable, equal bilaterally       Labs:   Recent Labs     07/31/22  0648 07/31/22  1847 08/01/22  0730   HGB 8.6* 9.3* 8.7*   HCT 26.9* 30.0* 27.2*       Recent Labs     08/01/22  0730      K 3.8      CO2 26 BUN 16   CREATININE <0.5*   CALCIUM 9.1       No results for input(s): AST, ALT, BILIDIR, BILITOT, ALKPHOS in the last 72 hours. No results for input(s): INR in the last 72 hours. No results for input(s): Shreya Hope in the last 72 hours. Urinalysis:      Lab Results   Component Value Date/Time    NITRU POSITIVE 05/28/2019 03:20 PM    WBCUA 0-2 05/28/2019 03:20 PM    BACTERIA 4+ 05/28/2019 03:20 PM    RBCUA 3-5 05/28/2019 03:20 PM    BLOODU TRACE-LYSED 05/28/2019 03:20 PM    SPECGRAV <=1.005 05/28/2019 03:20 PM    GLUCOSEU Negative 05/28/2019 03:20 PM       Radiology:  No orders to display           Assessment/Plan:    Active Hospital Problems    Diagnosis     Acute GI hemorrhage [K92.2]      Priority: Medium    History of DVT (deep vein thrombosis) [Z86.718]      Priority: Medium    CHANDRIKA on CPAP [G47.33, Z99.89]      Priority: Medium    Wilbur lesion, 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 Acute blood loss anemia. Possibly secondary to ulcers. Patient received transfusion earlier. Will monitor hemoglobin closely. Will decrease frequency of blood draws. Recheck CBC in the morning. If CBC greater than 8 patient may be discharged. We will hold aspirin for now. 2.  DVT. Eliquis will be restarted on discharge per GI. 3.  Rheumatoid arthritis. Continue current medications patient will follow with rheumatologist as scheduled. 4.  Hypothyroidism. We will continue Synthroid monitor closely. DVT Prophylaxis: AC on hold due to bleed  Diet: ADULT DIET;  Regular  Code Status: Full Code    PT/OT Eval Status: Pending    Dispo -Home when stable possibly in a..     Annai Schuster MD

## 2022-08-01 NOTE — PLAN OF CARE
Problem: Discharge Planning  Goal: Discharge to home or other facility with appropriate resources  Outcome: Progressing  Flowsheets (Taken 8/1/2022 0745)  Discharge to home or other facility with appropriate resources: Identify barriers to discharge with patient and caregiver     Problem: Pain  Goal: Verbalizes/displays adequate comfort level or baseline comfort level  Outcome: Progressing     Problem: Skin/Tissue Integrity  Goal: Absence of new skin breakdown  Description: 1. Monitor for areas of redness and/or skin breakdown  2. Assess vascular access sites hourly  3. Every 4-6 hours minimum:  Change oxygen saturation probe site  4. Every 4-6 hours:  If on nasal continuous positive airway pressure, respiratory therapy assess nares and determine need for appliance change or resting period.   Outcome: Progressing     Problem: ABCDS Injury Assessment  Goal: Absence of physical injury  Outcome: Progressing     Problem: Safety - Adult  Goal: Free from fall injury  Outcome: Progressing     Problem: Hematologic - Adult  Goal: Maintains hematologic stability  Outcome: Progressing  Flowsheets (Taken 8/1/2022 0745)  Maintains hematologic stability: Assess for signs and symptoms of bleeding or hemorrhage     Problem: Respiratory - Adult  Goal: Achieves optimal ventilation and oxygenation  Outcome: Progressing     Problem: Skin/Tissue Integrity - Adult  Goal: Skin integrity remains intact  Outcome: Progressing  Flowsheets (Taken 8/1/2022 0745)  Skin Integrity Remains Intact: Monitor for areas of redness and/or skin breakdown     Problem: Musculoskeletal - Adult  Goal: Return mobility to safest level of function  Outcome: Progressing     Problem: Gastrointestinal - Adult  Goal: Maintains adequate nutritional intake  Outcome: Progressing     Problem: Genitourinary - Adult  Goal: Absence of urinary retention  Outcome: Progressing

## 2022-08-02 VITALS
TEMPERATURE: 99.4 F | WEIGHT: 160 LBS | SYSTOLIC BLOOD PRESSURE: 133 MMHG | BODY MASS INDEX: 29.44 KG/M2 | OXYGEN SATURATION: 92 % | DIASTOLIC BLOOD PRESSURE: 66 MMHG | RESPIRATION RATE: 16 BRPM | HEART RATE: 77 BPM | HEIGHT: 62 IN

## 2022-08-02 LAB
HCT VFR BLD CALC: 26.6 % (ref 36–48)
HCT VFR BLD CALC: 28.4 % (ref 36–48)
HEMOGLOBIN: 8.3 G/DL (ref 12–16)
HEMOGLOBIN: 9 G/DL (ref 12–16)
IMMATURE RETIC FRACT: 0.5 (ref 0.21–0.37)
MCH RBC QN AUTO: 27.5 PG (ref 26–34)
MCHC RBC AUTO-ENTMCNC: 31.6 G/DL (ref 31–36)
MCV RBC AUTO: 86.9 FL (ref 80–100)
PDW BLD-RTO: 18.7 % (ref 12.4–15.4)
PLATELET # BLD: 132 K/UL (ref 135–450)
PMV BLD AUTO: 7.2 FL (ref 5–10.5)
RBC # BLD: 3.27 M/UL (ref 4–5.2)
RETICULOCYTE ABSOLUTE COUNT: 0.06 M/UL (ref 0.02–0.1)
RETICULOCYTE COUNT PCT: 1.84 % (ref 0.5–2.18)
WBC # BLD: 6.6 K/UL (ref 4–11)

## 2022-08-02 PROCEDURE — 6370000000 HC RX 637 (ALT 250 FOR IP): Performed by: INTERNAL MEDICINE

## 2022-08-02 PROCEDURE — 36415 COLL VENOUS BLD VENIPUNCTURE: CPT

## 2022-08-02 PROCEDURE — 6370000000 HC RX 637 (ALT 250 FOR IP): Performed by: NURSE PRACTITIONER

## 2022-08-02 PROCEDURE — 85045 AUTOMATED RETICULOCYTE COUNT: CPT

## 2022-08-02 PROCEDURE — 85014 HEMATOCRIT: CPT

## 2022-08-02 PROCEDURE — 85027 COMPLETE CBC AUTOMATED: CPT

## 2022-08-02 PROCEDURE — 2580000003 HC RX 258: Performed by: INTERNAL MEDICINE

## 2022-08-02 PROCEDURE — G0378 HOSPITAL OBSERVATION PER HR: HCPCS

## 2022-08-02 PROCEDURE — 85018 HEMOGLOBIN: CPT

## 2022-08-02 RX ORDER — PANTOPRAZOLE SODIUM 40 MG/1
40 TABLET, DELAYED RELEASE ORAL
Qty: 60 TABLET | Refills: 3 | Status: SHIPPED | OUTPATIENT
Start: 2022-08-02 | End: 2022-08-18

## 2022-08-02 RX ADMIN — SUCRALFATE 1 G: 1 TABLET ORAL at 13:57

## 2022-08-02 RX ADMIN — HYDROCODONE BITARTRATE AND ACETAMINOPHEN 1 TABLET: 5; 325 TABLET ORAL at 14:31

## 2022-08-02 RX ADMIN — SUCRALFATE 1 G: 1 TABLET ORAL at 06:36

## 2022-08-02 RX ADMIN — PANTOPRAZOLE SODIUM 40 MG: 40 TABLET, DELAYED RELEASE ORAL at 09:22

## 2022-08-02 RX ADMIN — CARBOXYMETHYLCELLULOSE SODIUM 1 DROP: 10 GEL OPHTHALMIC at 09:18

## 2022-08-02 RX ADMIN — LOSARTAN POTASSIUM 50 MG: 25 TABLET, FILM COATED ORAL at 09:18

## 2022-08-02 RX ADMIN — LEVOTHYROXINE SODIUM 150 MCG: 0.15 TABLET ORAL at 07:16

## 2022-08-02 RX ADMIN — Medication 400 MG: at 09:18

## 2022-08-02 RX ADMIN — Medication 10 ML: at 09:19

## 2022-08-02 RX ADMIN — ZOLPIDEM TARTRATE 5 MG: 5 TABLET ORAL at 00:23

## 2022-08-02 RX ADMIN — LEFLUNOMIDE 20 MG: 10 TABLET ORAL at 09:22

## 2022-08-02 RX ADMIN — METOPROLOL SUCCINATE 25 MG: 25 TABLET, EXTENDED RELEASE ORAL at 09:18

## 2022-08-02 ASSESSMENT — PAIN SCALES - GENERAL
PAINLEVEL_OUTOF10: 0
PAINLEVEL_OUTOF10: 6

## 2022-08-02 ASSESSMENT — PAIN DESCRIPTION - LOCATION: LOCATION: BACK

## 2022-08-02 ASSESSMENT — PAIN - FUNCTIONAL ASSESSMENT: PAIN_FUNCTIONAL_ASSESSMENT: ACTIVITIES ARE NOT PREVENTED

## 2022-08-02 NOTE — PROGRESS NOTES
Patient discharged. IV removed, telemetry box and leads removed and returned. Lockbox emptied. All belongings gathered and returned to patient. Discharge instructions reviewed with patient, all questions answered by RN. No further needs. Pantoprazole too early to fill per pharmacy.

## 2022-08-02 NOTE — CARE COORDINATION
CASE MANAGEMENT DISCHARGE SUMMARY      Discharge to: home w/family     IMM given: (date)     Transportation:    Family/car: family     Confirmed discharge plan with:     Patient: yes/     Family:  yes - spouse, see previous note     Facility/Agency, name:  [facility]/AVS faxed to [employer] and agency notified     RN, name: Ginny Rodriguez RN

## 2022-08-02 NOTE — CARE COORDINATION
Per discussion at physician huddle, pt is ready for discharge. CM notified spouse via phone, and he states plan is for daughter to provide transport home. Dr Cesario Duverney to sign TYESHA and place new hospice orders. [facility] hospice notified of discharge and CM will fax AVS to office when completed by provider. VM left for daughter, [facility], to see when she could  patient.

## 2022-08-02 NOTE — DISCHARGE SUMMARY
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 Hospital Course:   80 y.o. female who presented to [facility] with above complaints  Patient with PMH of GERD, history of GI bleed due to gastric antral ulcers and Wilbur lesions, HTN, HLD, Hx of DVT on Eliquis, thyroid disease presented to the ED today with abnormal labs. Patient reports her rheumatologist from [facility] had ordered labs on 7/20 which included a CBC. That showed a lower hemoglobin of 8.3. Back in January 2022 her hemoglobin was 12.0. She saw her PCP on 7/26 and had repeat labs done which showed a hemoglobin of 7.9. Today family reported that they saw some blood in her stools. They were asked to come to the ED for further management. Patient reports she usually does not visualize her stools after a bowel movement so she does not know if she was having any hematochezia or melena.   She denied any abdominal pain or bloody vomiting. She takes Eliquis every day for history of DVT. Denied any NSAID use. She reportedly has doubled her Protonix and is taking some iron supplements as well. Back in 2018 November she underwent EGD for GI bleed here at [facility] which showed 2 small 6 mm white-based antral ulcers, gastritis and HH with Wilbur erosions. She reports her sulfasalazine was stopped by her rheumatologist.    Patient treated for:       1. Acute blood loss anemia. Secondary to GI bleed. Hemoglobin has been stable. GI did clear patient for discharge. Patient will continue to hold aspirin for 2 weeks. She will resume Eliquis on discharge. Patient denies any abdominal complaints. 2.  History of DVT. Eliquis will be restarted on discharge per GI. 3.  Rheumatoid arthritis. Continue current medications patient will follow with rheumatologist as scheduled. 4.  Hypothyroidism. We will continue Synthroid monitor closely. Patient remained hemodynamically stable and hemoglobin remained stable. Patient will be discharged to home. Patient will resume home hospice on discharge. Physical Exam Performed:     /66   Pulse 77   Temp 99.4 °F (37.4 °C) (Oral)   Resp 16   Ht 5' 2\" (1.575 m)   Wt 160 lb (72.6 kg)   SpO2 92%   BMI 29.26 kg/m²       General appearance: No apparent distress, appears stated age and cooperative. HEENT: Pupils equal, round, and reactive to light. Conjunctivae/corneas clear. Neck: Supple, with full range of motion. No jugular venous distention. Trachea midline. Respiratory:  Normal respiratory effort. Clear to auscultation, bilaterally without Rales/Wheezes/Rhonchi. Cardiovascular: Regular rate and rhythm with normal S1/S2 without murmurs, rubs or gallops. Abdomen: Soft, non-tender, non-distended with normal bowel sounds. Musculoskeletal: No clubbing, cyanosis or edema bilaterally. Full range of motion without deformity.   Skin: Skin color, texture, turgor normal.  No rashes or lesions. Neurologic:  Neurovascularly intact without any focal sensory/motor deficits. Cranial nerves: II-XII intact, grossly non-focal.  Psychiatric: Alert and oriented, thought content appropriate, normal insight  Capillary Refill: Brisk,3 seconds, normal  Peripheral Pulses: +2 palpable, equal bilaterally         Labs: For convenience and continuity at follow-up the following most recent labs are provided:      CBC:    Lab Results   Component Value Date/Time    WBC 6.6 08/02/2022 10:04 AM    HGB 9.0 08/02/2022 10:04 AM    HCT 28.4 08/02/2022 10:04 AM     08/02/2022 10:04 AM       Renal:    Lab Results   Component Value Date/Time     08/01/2022 07:30 AM    K 3.8 08/01/2022 07:30 AM     08/01/2022 07:30 AM    CO2 26 08/01/2022 07:30 AM    BUN 16 08/01/2022 07:30 AM    CREATININE <0.5 08/01/2022 07:30 AM    CALCIUM 9.1 08/01/2022 07:30 AM         Significant Diagnostic Studies    Radiology:   No orders to display          Consults:     IP CONSULT TO GI  IP CONSULT TO HOSPICE    Disposition: Home with home hospice    Condition at Discharge: Stable    Discharge Instructions/Follow-up:     Follow up with Dr. Adilene Burks, [address] [address] [phone]          Follow up with Dr. Adilene Burks MD in 1 week(s)  Specialty:Family Medicine  Post hospital follow up [facility])  [address] [place] [address] [phone]          Follow up with Dr. Gurwinder Johnson DO in 2 week(s)  Specialty:Gastroenterology  Post hospital follow up [facility] [address] [phone]   Aug10 Lab  Wednesday Aug 10, 2022 10:40 AM  If this is a fasting lab, please do not eat or drink past midnight other thanwater.  [facility]  [address] [place] [address]  [phone]   [id number] Office Visit with Dr. Adilene Burks MD  Monday Jan 30, 2023 2:00 PM  Please complete digital registration via the [facility]) igor. If unable to complete the visit pre-check, arrive 15 minutes early. Bring photo ID, insurance card(s), co-pay, medication bottles & completed forms. If you need to cancel/reschedule, please contact the practice at least 24 hoursprior to the appointment. [address]  [phone]       Code Status:  Full Code     Activity: activity as tolerated    Diet: Resume previous diet      Discharge Medications:     Current Discharge Medication List             Details   pantoprazole (PROTONIX) 40 MG tablet Take 1 tablet by mouth in the morning and 1 tablet in the evening. Take before meals. Qty: 60 tablet, Refills: 3                Details   zolpidem (AMBIEN) 10 MG tablet Take 0.5 tablets by mouth nightly as needed for Sleep for up to 60 days. Qty: 60 tablet, Refills: 2    Associated Diagnoses: Primary insomnia      HYDROcodone-acetaminophen (NORCO) 5-325 MG per tablet Take 1 tablet by mouth 2 times daily as needed for Pain for up to 30 days. Qty: 60 tablet, Refills: 0    Comments: Reduce doses taken as pain becomes manageable  Associated Diagnoses: Rheumatoid arthritis involving multiple sites with positive rheumatoid factor ([facility] [address] Primary osteoarthritis involving multiple joints      fluticasone (FLONASE) 50 MCG/ACT nasal spray INHALE 1 SPRAY INTO EACH NOSTRIL EVERY DAY  Qty: 32 g, Refills: 3      losartan (COZAAR) 50 MG tablet TAKE 1 TABLET BY MOUTH IN THE MORNING AND ONE TABLET IN THE EVENING.   Qty: 90 tablet, Refills: 2    Associated Diagnoses: Essential hypertension      solifenacin (VESICARE) 5 MG tablet Take 1 tablet by mouth daily  Qty: 90 tablet, Refills: 3    Associated Diagnoses: Urinary frequency      magnesium oxide (MAG-OX) 400 MG tablet TAKE 1 TABLET BY MOUTH EVERY DAY  Qty: 90 tablet, Refills: 1      levothyroxine (SYNTHROID) 150 MCG tablet TAKE 1 TABLET BY MOUTH EVERY DAY  Qty: 90 tablet, Refills: 3      ELIQUIS 5 MG TABS tablet TAKE 1 TABLET BY MOUTH TWICE A DAY  Qty: 60 tablet, Refills: 11      metoprolol succinate (TOPROL XL) 25 MG extended release tablet Take 0.5 tablets by mouth daily  Qty: 15 tablet, Refills: 5      sucralfate (CARAFATE) 1 GM tablet 1 g nightly    Associated Diagnoses: History of gastric ulcer      vitamin D (CHOLECALCIFEROL) 1000 UNIT TABS tablet Take 4,000 Units by mouth daily    Associated Diagnoses: Vitamin D deficiency      Multiple Vitamins-Minerals (THERAPEUTIC MULTIVITAMIN-MINERALS) tablet Take 1 tablet by mouth daily    Associated Diagnoses: Vitamin D deficiency      Omega-3 Fatty Acids (FISH OIL) 1000 MG CAPS Take 1,000 mg by mouth 3 times daily    Associated Diagnoses: Vitamin D deficiency      leflunomide (ARAVA) 20 MG tablet Take 20 mg by mouth daily             Time Spent on discharge is more than 35 minutes in the examination, evaluation, counseling and review of medications and discharge plan. Signed:    Santos Bass MD   8/2/2022      Thank you Roddy Phan MD for the opportunity to be involved in this patient's care. If you have any questions or concerns, please feel free to contact me at [phone].

## 2022-08-02 NOTE — DISCHARGE INSTR - COC
Continuity of Care Form    Patient Name: Araceli Lazo   :  1927  MRN:  [id number]    Admit date:  2022  Discharge date:  22     Code Status Order: Full Code   Advance Directives:     Admitting Physician:  Shakir Perez MD  PCP: Flores Parson MD    Discharging Nurse: Daniel Beasley [facility] Unit/Room#: 8392/4769-01  Discharging Unit Phone Number: [phone]    Emergency Contact:   Extended Emergency Contact Information  Primary Emergency Contact: Daniel Gold  Address: [address]  [address] Phone: [phone]  Relation: Spouse  Secondary Emergency Contact: [address] [place] [address] Phone: [phone]  Relation: Child    Past Surgical History:  Past Surgical History:   Procedure Laterality Date    CHOLECYSTECTOMY      CORONARY ANGIOPLASTY WITH STENT PLACEMENT      JOINT REPLACEMENT      \"knees and hips\"    DE ESOPHAGOGASTRODUODENOSCOPY TRANSORAL DIAGNOSTIC N/A 2018    EGD ESOPHAGOGASTRODUODENOSCOPY performed by Clara Mix MD at [facility] N/A 2022    EGD BIOPSY performed by Eugene Chahal DO at [facility]       Immunization History:   Immunization History   Administered Date(s) Administered    Influenza Vaccine, unspecified formulation 10/28/2015    Influenza Virus Vaccine 2012, 10/24/2013, 10/28/2015, 2016    Influenza Whole 10/14/2017    Influenza, Quadv, IM, PF (6 mo and older Fluzone, Flulaval, Fluarix, and 3 yrs and older Afluria) 2019, 10/08/2020    Pneumococcal Conjugate 13-valent (Vgzkqsr10) 2015    Pneumococcal Polysaccharide (Yunxwtiod57) 2009    Td, unspecified formulation 2006    Zoster Live (Zostavax) 2005       Active Problems:  Patient Active Problem List   Diagnosis Code    GI bleed K92.2    Acquired hypothyroidism E03.9    essential Hypertension I10    Mixed hyperlipidemia E78.2 completed shifts: In: 370 [P.O.:360; I.V.:10]  Out: 1500 [Urine:1500]    Safety Concerns: At Risk for Falls    Impairments/Disabilities:      Deformities to ankles and hands    Nutrition Therapy:  Current Nutrition Therapy:   - Oral Diet:  Carb Control 5 carbs/meal (2000kcals/day)    Routes of Feeding: Oral  Liquids: Thin Liquids  Daily Fluid Restriction: no  Last Modified Barium Swallow with Video (Video Swallowing Test): not done    Treatments at the Time of Hospital Discharge:   Respiratory Treatments: none  Oxygen Therapy:  is not on home oxygen therapy. Ventilator:    - No ventilator support    Rehab Therapies: hospice  Weight Bearing Status/Restrictions: No weight bearing restrictions  Other Medical Equipment (for information only, NOT a DME order):  crutches  Other Treatments: none    Patient's personal belongings (please select all that are sent with patient):  Abelardo    RN SIGNATURE:  Electronically signed by Aiyana Mcqueen RN on 8/2/22 at 2:02 PM EDT    CASE MANAGEMENT/SOCIAL WORK SECTION    Inpatient Status Date: 7/29/22    Readmission Risk Assessment Score:  Readmission Risk              Risk of Unplanned Readmission:  [id number]           Discharging to Facility/ Agency   Name: [facility]   Address:  Phone: [phone]  Fax: [phone]    / signature: Electronically signed by Kash Arana RN on 8/2/22 at 12:02 PM EDT    PHYSICIAN SECTION    Prognosis: Fair    Condition at Discharge: Stable    Rehab Potential (if transferring to Rehab): Fair    Recommended Labs or Other Treatments After Discharge:     Check CBC on 8/3/2022 and 8/5/2022. Send results to Avelino Herrera MD       Follow up with Dr. Donte Karus MD  [address]  [facility] [address] [phone]          Follow up with Dr. Donte Kraus MD in 1 week(s)  Specialty:Family Medicine  Post hospital follow up [address] [facility] [address] [address]  [phone]          Follow up with Dr. Juan Antonio Aguilar DO in 2 week(s)  Specialty:Gastroenterology  Post hospital follow up [facility] [address] [phone]   Aug10 Lab  Wednesday Aug 10, 2022 10:40 AM  If this is a fasting lab, please do not eat or drink past midnight other thanwater. [address]  [phone]   [id number] Office Visit with Dr. Denny Garcia MD  Monday Jan 30, 2023 2:00 PM  Please complete digital registration via the [employer] [facility]) igor. If unable to complete the visit pre-check, arrive 15 minutes early. Bring photo ID, insurance card(s), co-pay, medication bottles & completed forms. If you need to cancel/reschedule, please contact the practice at least 24 hoursprior to the appointment. [address]  [phone]     Physician Certification: I certify the above information and transfer of Sreekanth Brannon  is necessary for the continuing treatment of the diagnosis listed and that she requires Hospice for greater 30 days.      Update Admission H&P: No change in H&P    PHYSICIAN SIGNATURE:  Electronically signed by Jewels Carcamo MD on 8/2/22 at 12:33 PM EDT

## 2022-08-03 ENCOUNTER — HOSPITAL ENCOUNTER (OUTPATIENT)
Age: 87
Setting detail: SPECIMEN
Discharge: HOME | End: 2022-08-03
Payer: MEDICARE

## 2022-08-03 ENCOUNTER — TELEPHONE (OUTPATIENT)
Dept: FAMILY MEDICINE CLINIC | Age: 87
End: 2022-08-03

## 2022-08-03 DIAGNOSIS — R26.9 GAIT DISTURBANCE: ICD-10-CM

## 2022-08-03 DIAGNOSIS — M05.79 RHEUMATOID ARTHRITIS INVOLVING MULTIPLE SITES WITH POSITIVE RHEUMATOID FACTOR (HCC): ICD-10-CM

## 2022-08-03 DIAGNOSIS — K25.4 GASTROINTESTINAL HEMORRHAGE ASSOCIATED WITH GASTRIC ULCER: ICD-10-CM

## 2022-08-03 DIAGNOSIS — R34 ANURIA: ICD-10-CM

## 2022-08-03 DIAGNOSIS — I10 PRIMARY HYPERTENSION: Primary | ICD-10-CM

## 2022-08-03 LAB
ANION GAP SERPL CALCULATED.3IONS-SCNC: 12 MMOL/L (ref 3–16)
BASOPHILS ABSOLUTE: 0.1 K/UL (ref 0–0.2)
BASOPHILS RELATIVE PERCENT: 0.9 %
BUN BLDV-MCNC: 17 MG/DL (ref 7–20)
CALCIUM SERPL-MCNC: 9.1 MG/DL (ref 8.3–10.6)
CHLORIDE BLD-SCNC: 101 MMOL/L (ref 99–110)
CO2: 27 MMOL/L (ref 21–32)
CREAT SERPL-MCNC: <0.5 MG/DL (ref 0.6–1.2)
EOSINOPHILS ABSOLUTE: 0.2 K/UL (ref 0–0.6)
EOSINOPHILS RELATIVE PERCENT: 2.2 %
GFR AFRICAN AMERICAN: >60
GFR NON-AFRICAN AMERICAN: >60
GLUCOSE BLD-MCNC: 111 MG/DL (ref 70–99)
HCT VFR BLD CALC: 29 % (ref 36–48)
HEMOGLOBIN: 9.1 G/DL (ref 12–16)
LYMPHOCYTES ABSOLUTE: 0.7 K/UL (ref 1–5.1)
LYMPHOCYTES RELATIVE PERCENT: 8.9 %
MCH RBC QN AUTO: 26.8 PG (ref 26–34)
MCHC RBC AUTO-ENTMCNC: 31.4 G/DL (ref 31–36)
MCV RBC AUTO: 85.1 FL (ref 80–100)
MONOCYTES ABSOLUTE: 1 K/UL (ref 0–1.3)
MONOCYTES RELATIVE PERCENT: 13.5 %
NEUTROPHILS ABSOLUTE: 5.5 K/UL (ref 1.7–7.7)
NEUTROPHILS RELATIVE PERCENT: 74.5 %
PDW BLD-RTO: 18.7 % (ref 12.4–15.4)
PLATELET # BLD: 151 K/UL (ref 135–450)
PMV BLD AUTO: 7.7 FL (ref 5–10.5)
POTASSIUM SERPL-SCNC: 4.1 MMOL/L (ref 3.5–5.1)
RBC # BLD: 3.4 M/UL (ref 4–5.2)
SODIUM BLD-SCNC: 140 MMOL/L (ref 136–145)
WBC # BLD: 7.3 K/UL (ref 4–11)

## 2022-08-03 PROCEDURE — 85025 COMPLETE CBC W/AUTO DIFF WBC: CPT

## 2022-08-03 PROCEDURE — 36415 COLL VENOUS BLD VENIPUNCTURE: CPT

## 2022-08-03 PROCEDURE — 80048 BASIC METABOLIC PNL TOTAL CA: CPT

## 2022-08-03 NOTE — TELEPHONE ENCOUNTER
Aleah with [address] called stating she went out to see pt for a STAT visit today. States that pt did void 2x before she got there. Bladder was not distended so she didn't straight cath her. States she did draw a BMP, and a CBC just in case. States labs will be dropped off at [facility] and PCP will be faxed the results.

## 2022-08-03 NOTE — TELEPHONE ENCOUNTER
[facility] Transitions Initial Follow Up Call    Outreach made within 2 business days of discharge: Yes    Patient: Hari Webster Patient : 1927   MRN: [id number]  Reason for Admission: There are no discharge diagnoses documented for the most recent discharge. Discharge Date: 22       Spoke with: Spouse  Discharge department/facility: [facility] Interactive Patient Contact:  Was patient able to fill all prescriptions: Yes  Was patient instructed to bring all medications to the follow-up visit: Yes  Is patient taking all medications as directed in the discharge summary?  Yes  Does patient understand their discharge instructions: Yes  Does patient have questions or concerns that need addressed prior to 7-14 day follow up office visit: no    Scheduled appointment with PCP within 7-14 days    Follow Up  Future Appointments   Date Time Provider Victor M Aguiar   8/15/2022  4:20 PM Sean Messina MD [address]   2023  2:00 PM Sean Messina MD [address] [place] [address]       Boom Stevens RN

## 2022-08-08 ENCOUNTER — HOSPITAL ENCOUNTER (OUTPATIENT)
Age: 87
Setting detail: SPECIMEN
Discharge: HOME | End: 2022-08-08
Payer: MEDICARE

## 2022-08-08 LAB
ANION GAP SERPL CALCULATED.3IONS-SCNC: 10 MMOL/L (ref 3–16)
BASOPHILS ABSOLUTE: 0.1 K/UL (ref 0–0.2)
BASOPHILS RELATIVE PERCENT: 1.8 %
BUN BLDV-MCNC: 14 MG/DL (ref 7–20)
CALCIUM SERPL-MCNC: 9.7 MG/DL (ref 8.3–10.6)
CHLORIDE BLD-SCNC: 101 MMOL/L (ref 99–110)
CO2: 28 MMOL/L (ref 21–32)
CREAT SERPL-MCNC: <0.5 MG/DL (ref 0.6–1.2)
EOSINOPHILS ABSOLUTE: 0.3 K/UL (ref 0–0.6)
EOSINOPHILS RELATIVE PERCENT: 7 %
GFR AFRICAN AMERICAN: >60
GFR NON-AFRICAN AMERICAN: >60
GLUCOSE BLD-MCNC: 116 MG/DL (ref 70–99)
HCT VFR BLD CALC: 29.1 % (ref 36–48)
HEMOGLOBIN: 9.3 G/DL (ref 12–16)
LYMPHOCYTES ABSOLUTE: 0.7 K/UL (ref 1–5.1)
LYMPHOCYTES RELATIVE PERCENT: 13.3 %
MCH RBC QN AUTO: 27.1 PG (ref 26–34)
MCHC RBC AUTO-ENTMCNC: 31.8 G/DL (ref 31–36)
MCV RBC AUTO: 85.3 FL (ref 80–100)
MONOCYTES ABSOLUTE: 0.6 K/UL (ref 0–1.3)
MONOCYTES RELATIVE PERCENT: 12.1 %
NEUTROPHILS ABSOLUTE: 3.2 K/UL (ref 1.7–7.7)
NEUTROPHILS RELATIVE PERCENT: 65.8 %
PDW BLD-RTO: 18.5 % (ref 12.4–15.4)
PLATELET # BLD: 264 K/UL (ref 135–450)
PMV BLD AUTO: 7.3 FL (ref 5–10.5)
POTASSIUM SERPL-SCNC: 4.2 MMOL/L (ref 3.5–5.1)
RBC # BLD: 3.41 M/UL (ref 4–5.2)
SODIUM BLD-SCNC: 139 MMOL/L (ref 136–145)
WBC # BLD: 4.9 K/UL (ref 4–11)

## 2022-08-08 PROCEDURE — 36415 COLL VENOUS BLD VENIPUNCTURE: CPT

## 2022-08-08 PROCEDURE — 85025 COMPLETE CBC W/AUTO DIFF WBC: CPT

## 2022-08-08 PROCEDURE — 80048 BASIC METABOLIC PNL TOTAL CA: CPT

## 2022-08-08 RX ORDER — LEVOTHYROXINE SODIUM 0.15 MG/1
TABLET ORAL
Qty: 90 TABLET | Refills: 0 | Status: SHIPPED | OUTPATIENT
Start: 2022-08-08

## 2022-08-11 RX ORDER — FLUTICASONE PROPIONATE 50 MCG
SPRAY, SUSPENSION (ML) NASAL
Qty: 1 EACH | Refills: 0 | Status: SHIPPED | OUTPATIENT
Start: 2022-08-11

## 2022-08-15 ENCOUNTER — TELEMEDICINE (OUTPATIENT)
Dept: FAMILY MEDICINE CLINIC | Age: 87
End: 2022-08-15
Payer: MEDICARE

## 2022-08-15 ENCOUNTER — HOSPITAL ENCOUNTER (OUTPATIENT)
Age: 87
Setting detail: SPECIMEN
Discharge: HOME | End: 2022-08-15
Payer: MEDICARE

## 2022-08-15 DIAGNOSIS — K25.4 GASTROINTESTINAL HEMORRHAGE ASSOCIATED WITH GASTRIC ULCER: Primary | ICD-10-CM

## 2022-08-15 DIAGNOSIS — K25.7: ICD-10-CM

## 2022-08-15 DIAGNOSIS — D62 ABLA (ACUTE BLOOD LOSS ANEMIA): ICD-10-CM

## 2022-08-15 DIAGNOSIS — K92.2 ACUTE GI HEMORRHAGE: ICD-10-CM

## 2022-08-15 DIAGNOSIS — I82.412 DVT OF DEEP FEMORAL VEIN, LEFT (HCC): ICD-10-CM

## 2022-08-15 LAB
ANION GAP SERPL CALCULATED.3IONS-SCNC: 10 MMOL/L (ref 3–16)
BASOPHILS ABSOLUTE: 0.1 K/UL (ref 0–0.2)
BASOPHILS RELATIVE PERCENT: 1.9 %
BUN BLDV-MCNC: 13 MG/DL (ref 7–20)
CALCIUM SERPL-MCNC: 9.4 MG/DL (ref 8.3–10.6)
CHLORIDE BLD-SCNC: 102 MMOL/L (ref 99–110)
CO2: 27 MMOL/L (ref 21–32)
CREAT SERPL-MCNC: <0.5 MG/DL (ref 0.6–1.2)
EOSINOPHILS ABSOLUTE: 0.3 K/UL (ref 0–0.6)
EOSINOPHILS RELATIVE PERCENT: 7.1 %
GFR AFRICAN AMERICAN: >60
GFR NON-AFRICAN AMERICAN: >60
GLUCOSE BLD-MCNC: 100 MG/DL (ref 70–99)
HCT VFR BLD CALC: 29.8 % (ref 36–48)
HEMOGLOBIN: 9.3 G/DL (ref 12–16)
LYMPHOCYTES ABSOLUTE: 0.6 K/UL (ref 1–5.1)
LYMPHOCYTES RELATIVE PERCENT: 14.3 %
MCH RBC QN AUTO: 26.1 PG (ref 26–34)
MCHC RBC AUTO-ENTMCNC: 31.3 G/DL (ref 31–36)
MCV RBC AUTO: 83.4 FL (ref 80–100)
MONOCYTES ABSOLUTE: 0.6 K/UL (ref 0–1.3)
MONOCYTES RELATIVE PERCENT: 13.4 %
NEUTROPHILS ABSOLUTE: 2.8 K/UL (ref 1.7–7.7)
NEUTROPHILS RELATIVE PERCENT: 63.3 %
PDW BLD-RTO: 18.5 % (ref 12.4–15.4)
PLATELET # BLD: 388 K/UL (ref 135–450)
PMV BLD AUTO: 7 FL (ref 5–10.5)
POTASSIUM SERPL-SCNC: 4.2 MMOL/L (ref 3.5–5.1)
RBC # BLD: 3.57 M/UL (ref 4–5.2)
SODIUM BLD-SCNC: 139 MMOL/L (ref 136–145)
WBC # BLD: 4.5 K/UL (ref 4–11)

## 2022-08-15 PROCEDURE — 99441 PR PHYS/QHP TELEPHONE EVALUATION 5-10 MIN: CPT | Performed by: FAMILY MEDICINE

## 2022-08-15 PROCEDURE — 80048 BASIC METABOLIC PNL TOTAL CA: CPT

## 2022-08-15 PROCEDURE — 85025 COMPLETE CBC W/AUTO DIFF WBC: CPT

## 2022-08-15 SDOH — ECONOMIC STABILITY: FOOD INSECURITY: WITHIN THE PAST 12 MONTHS, YOU WORRIED THAT YOUR FOOD WOULD RUN OUT BEFORE YOU GOT MONEY TO BUY MORE.: NEVER TRUE

## 2022-08-15 SDOH — ECONOMIC STABILITY: FOOD INSECURITY: WITHIN THE PAST 12 MONTHS, THE FOOD YOU BOUGHT JUST DIDN'T LAST AND YOU DIDN'T HAVE MONEY TO GET MORE.: NEVER TRUE

## 2022-08-15 ASSESSMENT — PATIENT HEALTH QUESTIONNAIRE - PHQ9
SUM OF ALL RESPONSES TO PHQ QUESTIONS 1-9: 0
2. FEELING DOWN, DEPRESSED OR HOPELESS: 0
SUM OF ALL RESPONSES TO PHQ QUESTIONS 1-9: 0
1. LITTLE INTEREST OR PLEASURE IN DOING THINGS: 0
SUM OF ALL RESPONSES TO PHQ9 QUESTIONS 1 & 2: 0

## 2022-08-15 ASSESSMENT — SOCIAL DETERMINANTS OF HEALTH (SDOH): HOW HARD IS IT FOR YOU TO PAY FOR THE VERY BASICS LIKE FOOD, HOUSING, MEDICAL CARE, AND HEATING?: NOT HARD AT ALL

## 2022-08-15 NOTE — PROGRESS NOTES
Sara Franco is a 80 y.o. female evaluated via telephone on 8/15/2022. Follow up from hospital [facility] for GI bleed. States she is doing better continues to do therapy with home care nurse. States her vitals are checked regularly but she isnt sure what the readings have been. Internal Administration   First Dose COVID-19, MODERNA BLUE border, Primary or Immunocompromised, (age 12y+), IM, 100 mcg/0.5mL  01/20/2021   Second Dose COVID-19, MODERNA BLUE border, Primary or Immunocompromised, (age 12y+), IM, 100 mcg/0.5mL   02/17/2021       Last COVID Lab No results found for: SARS-COV-2, SARS-COV-2 RNA, SARS-COV-2, SARS-COV-2, SARS-COV-2 BY PCR, SARS-COV-2, SARS-COV-2, SARS-COV-2         Wt Readings from Last 3 Encounters:   07/29/22 160 lb (72.6 kg)   12/03/21 159 lb 9.6 oz (72.4 kg)   06/16/20 166 lb (75.3 kg)     BP Readings from Last 3 Encounters:   08/02/22 133/66   07/26/22 128/60   12/03/21 (!) 146/77     No results found for: LABA1C     ASSESSMENT PLAN      Diagnosis Orders   1. Gastrointestinal hemorrhage associated with gastric ulcer        2. DVT of deep femoral vein, left (HCC)        3. Wilbur lesion, chronic        4. Acute GI hemorrhage        5. ABLA (acute blood loss anemia)        Patient has no visible bleeding. Her hemoglobin is stable at 9.3 today compared to a week ago. GFR remains over 60. She is finishing her physical therapy and skilled nursing wants to know if that could be extended for lab draws. States she has a daughter to nurse could draw the blood but would need supplies. Our nurse coordinator will be investigating possibilities. No change in treatment necessary at this time. Virtual follow-up by phone 1 month    Patient should call the office immediately with new or ongoing signs or symptoms or worsening, or proceed to the emergency room.   No changes in past medical history, past surgical history, social history, or family history were noted during the

## 2022-08-18 RX ORDER — PANTOPRAZOLE SODIUM 40 MG/1
TABLET, DELAYED RELEASE ORAL
Qty: 60 TABLET | Refills: 2 | Status: SHIPPED | OUTPATIENT
Start: 2022-08-18 | End: 2022-10-27

## 2022-08-22 ENCOUNTER — HOSPITAL ENCOUNTER (OUTPATIENT)
Age: 87
Setting detail: SPECIMEN
Discharge: HOME | End: 2022-08-22
Payer: MEDICARE

## 2022-08-22 LAB
ANION GAP SERPL CALCULATED.3IONS-SCNC: 12 MMOL/L (ref 3–16)
BASOPHILS ABSOLUTE: 0.1 K/UL (ref 0–0.2)
BASOPHILS RELATIVE PERCENT: 1.4 %
BUN BLDV-MCNC: 12 MG/DL (ref 7–20)
CALCIUM SERPL-MCNC: 9.3 MG/DL (ref 8.3–10.6)
CHLORIDE BLD-SCNC: 102 MMOL/L (ref 99–110)
CO2: 26 MMOL/L (ref 21–32)
CREAT SERPL-MCNC: <0.5 MG/DL (ref 0.6–1.2)
EOSINOPHILS ABSOLUTE: 0.4 K/UL (ref 0–0.6)
EOSINOPHILS RELATIVE PERCENT: 8.5 %
GFR AFRICAN AMERICAN: >60
GFR NON-AFRICAN AMERICAN: >60
GLUCOSE BLD-MCNC: 113 MG/DL (ref 70–99)
HCT VFR BLD CALC: 29.9 % (ref 36–48)
HEMOGLOBIN: 9.5 G/DL (ref 12–16)
LYMPHOCYTES ABSOLUTE: 0.6 K/UL (ref 1–5.1)
LYMPHOCYTES RELATIVE PERCENT: 14.8 %
MCH RBC QN AUTO: 25.5 PG (ref 26–34)
MCHC RBC AUTO-ENTMCNC: 31.8 G/DL (ref 31–36)
MCV RBC AUTO: 80.3 FL (ref 80–100)
MONOCYTES ABSOLUTE: 0.6 K/UL (ref 0–1.3)
MONOCYTES RELATIVE PERCENT: 13 %
NEUTROPHILS ABSOLUTE: 2.7 K/UL (ref 1.7–7.7)
NEUTROPHILS RELATIVE PERCENT: 62.3 %
PDW BLD-RTO: 19.1 % (ref 12.4–15.4)
PLATELET # BLD: 351 K/UL (ref 135–450)
PMV BLD AUTO: 7 FL (ref 5–10.5)
POTASSIUM SERPL-SCNC: 4.2 MMOL/L (ref 3.5–5.1)
RBC # BLD: 3.72 M/UL (ref 4–5.2)
SODIUM BLD-SCNC: 140 MMOL/L (ref 136–145)
WBC # BLD: 4.4 K/UL (ref 4–11)

## 2022-08-22 PROCEDURE — 80048 BASIC METABOLIC PNL TOTAL CA: CPT

## 2022-08-22 PROCEDURE — 85025 COMPLETE CBC W/AUTO DIFF WBC: CPT

## 2022-08-25 ENCOUNTER — TELEPHONE (OUTPATIENT)
Dept: PHARMACY | Facility: CLINIC | Age: 87
End: 2022-08-25

## 2022-08-25 NOTE — TELEPHONE ENCOUNTER
[facility] CLINICAL PHARMACY: ADHERENCE REVIEW  Identified care gap per [employer]: fills at [facility]: ACE/ARB adherence    Last Visit: 22    Patient also appears to be prescribed: LOSARTAN POT TAB 50MG     Patient not found in Outcomes [facility]    [address] Records claims through 22 (Prior Year Amarjit Benitez =  93%; YTD [place] =  83%; Potential Fail Date: 22 ):   LOSARTAN POT TAB 50MG last filled on 22 for 90 day supply. Next refill due: 22    Per  [facility] Portal:  LOSARTAN POT TAB 50MG last filled on 22 for 14 day supply. Per [facility]:   LOSARTAN POT TAB 50MG last picked up on 22 for 90 day supply. 2 refills remaining. Billed through [employer] 57 filled on 22 for a 14 days     BP Readings from Last 3 Encounters:   22 133/66   22 128/60   21 (!) 146/77     Estimated Creatinine Clearance: 63 mL/min (based on SCr of 0.5 mg/dL). PLAN  The following are interventions that have been identified:   Patient is in skilled living facility     No patient out reach planned at this time.     No education provided      Future Appointments   Date Time Provider Victor M Aguiar   2023  2:00 PM Ck Preciado MD [address] [place] [address] // Department, toll free [phone], Option 1      For Pharmacy Admin Tracking Only    CPA in place:  No  Intervention Detail: Adherence Monitorin  Gap Closed?: No   Time Spent (min): 10

## 2022-08-29 ENCOUNTER — TELEPHONE (OUTPATIENT)
Dept: FAMILY MEDICINE CLINIC | Age: 87
End: 2022-08-29

## 2022-08-29 NOTE — TELEPHONE ENCOUNTER
Aleah with [address] call with some updates on pt. Pt will be d/c from nursing serviced today(8/29) and d/c from PT on Wednesday (8/301). States that pt's  Dx pt with UTI and started pt on Cipro 250MG 2x daily and has enough for 10 days. Judith Johnson is not sure where he'd gotten this Rx or who prescribed it. States that pt is asymptomatic.  Any questions call back Judith Johnson [phone]

## 2022-09-08 RX ORDER — APIXABAN 5 MG/1
TABLET, FILM COATED ORAL
Qty: 60 TABLET | Refills: 2 | Status: SHIPPED | OUTPATIENT
Start: 2022-09-08

## 2022-10-26 DIAGNOSIS — I10 ESSENTIAL HYPERTENSION: ICD-10-CM

## 2022-10-27 RX ORDER — LOSARTAN POTASSIUM 50 MG/1
TABLET ORAL
Qty: 180 TABLET | Refills: 0 | Status: SHIPPED | OUTPATIENT
Start: 2022-10-27

## 2022-10-27 RX ORDER — PANTOPRAZOLE SODIUM 40 MG/1
TABLET, DELAYED RELEASE ORAL
Qty: 180 TABLET | Refills: 0 | Status: SHIPPED | OUTPATIENT
Start: 2022-10-27 | End: 2022-12-06

## 2022-10-31 DIAGNOSIS — Z87.11 HISTORY OF GASTRIC ULCER: ICD-10-CM

## 2022-11-01 ENCOUNTER — TELEPHONE (OUTPATIENT)
Dept: FAMILY MEDICINE CLINIC | Age: 87
End: 2022-11-01

## 2022-11-01 RX ORDER — OSELTAMIVIR PHOSPHATE 30 MG/1
30 CAPSULE ORAL DAILY
Qty: 5 CAPSULE | Refills: 0 | Status: SHIPPED | OUTPATIENT
Start: 2022-11-01 | End: 2022-11-06

## 2022-11-01 RX ORDER — SUCRALFATE 1 G/1
1 TABLET ORAL NIGHTLY
Qty: 120 TABLET | Refills: 2 | Status: SHIPPED | OUTPATIENT
Start: 2022-11-01

## 2022-11-01 NOTE — TELEPHONE ENCOUNTER
Gene(EC) is requesting Rx for Tamiflu to have \"on hand\" for pt.  Pt uses [address] [place] [address]. Call back [phone]

## 2022-11-15 RX ORDER — PANTOPRAZOLE SODIUM 40 MG/1
TABLET, DELAYED RELEASE ORAL
Qty: 180 TABLET | Refills: 0 | OUTPATIENT
Start: 2022-11-15

## 2022-11-21 ENCOUNTER — TELEPHONE (OUTPATIENT)
Dept: FAMILY MEDICINE CLINIC | Age: 87
End: 2022-11-21

## 2022-11-21 NOTE — TELEPHONE ENCOUNTER
Pharmacy doesn't have the Rx for \"Tamiflu 30 mg quantity #5 take 1 daily x5 days for either treatment or prophylaxis. 1 refill\" in stock. Please advise.

## 2022-11-21 NOTE — TELEPHONE ENCOUNTER
I copied the Rx with directions and refill from previous request.   They don't have the Rx in stock.

## 2022-11-29 ENCOUNTER — TELEPHONE (OUTPATIENT)
Dept: FAMILY MEDICINE CLINIC | Age: 87
End: 2022-11-29

## 2022-11-29 NOTE — TELEPHONE ENCOUNTER
Informed Santos Stevenson NP that no, no exposures. They asked for this prior as something to just have incase. She informed me that it is illegal to do so and to inform patient that if they test positive for flu that the medication can then be sent in for them. Called and informed patient and  of this.

## 2022-11-29 NOTE — TELEPHONE ENCOUNTER
Pt's  was wanting to se if he could get Tamiflu 75 mg sent to [facility] for pt. This is just for precautions.

## 2022-12-01 ENCOUNTER — TELEPHONE (OUTPATIENT)
Dept: FAMILY MEDICINE CLINIC | Age: 87
End: 2022-12-01

## 2022-12-01 DIAGNOSIS — R68.89 FLU-LIKE SYMPTOMS: Primary | ICD-10-CM

## 2022-12-01 NOTE — TELEPHONE ENCOUNTER
Received a fax from pharmacy that the Oseltamivir Phos 30MG Capsules are on backorder. See attached. Please advise.   Routing to [facility] due to PCP out of office

## 2022-12-01 NOTE — TELEPHONE ENCOUNTER
Please make sure patient has enough medication to last until PCP returns for further recommendations

## 2022-12-05 RX ORDER — OSELTAMIVIR PHOSPHATE 75 MG/1
75 CAPSULE ORAL DAILY
Qty: 3 CAPSULE | Refills: 0 | Status: SHIPPED | OUTPATIENT
Start: 2022-12-05 | End: 2022-12-08

## 2022-12-05 SDOH — HEALTH STABILITY: PHYSICAL HEALTH: ON AVERAGE, HOW MANY MINUTES DO YOU ENGAGE IN EXERCISE AT THIS LEVEL?: 20 MIN

## 2022-12-05 SDOH — HEALTH STABILITY: PHYSICAL HEALTH: ON AVERAGE, HOW MANY DAYS PER WEEK DO YOU ENGAGE IN MODERATE TO STRENUOUS EXERCISE (LIKE A BRISK WALK)?: 6 DAYS

## 2022-12-05 ASSESSMENT — LIFESTYLE VARIABLES
HOW MANY STANDARD DRINKS CONTAINING ALCOHOL DO YOU HAVE ON A TYPICAL DAY: PATIENT DOES NOT DRINK
HOW OFTEN DO YOU HAVE A DRINK CONTAINING ALCOHOL: NEVER
HOW OFTEN DO YOU HAVE A DRINK CONTAINING ALCOHOL: 1
HOW MANY STANDARD DRINKS CONTAINING ALCOHOL DO YOU HAVE ON A TYPICAL DAY: 0
HOW OFTEN DO YOU HAVE SIX OR MORE DRINKS ON ONE OCCASION: 1

## 2022-12-05 ASSESSMENT — PATIENT HEALTH QUESTIONNAIRE - PHQ9
SUM OF ALL RESPONSES TO PHQ QUESTIONS 1-9: 1
SUM OF ALL RESPONSES TO PHQ9 QUESTIONS 1 & 2: 1
1. LITTLE INTEREST OR PLEASURE IN DOING THINGS: 1
SUM OF ALL RESPONSES TO PHQ QUESTIONS 1-9: 1
2. FEELING DOWN, DEPRESSED OR HOPELESS: 0

## 2022-12-06 ENCOUNTER — TELEMEDICINE (OUTPATIENT)
Dept: FAMILY MEDICINE CLINIC | Age: 87
End: 2022-12-06
Payer: MEDICARE

## 2022-12-06 DIAGNOSIS — Z00.00 MEDICARE ANNUAL WELLNESS VISIT, SUBSEQUENT: Primary | ICD-10-CM

## 2022-12-06 PROCEDURE — 1123F ACP DISCUSS/DSCN MKR DOCD: CPT | Performed by: FAMILY MEDICINE

## 2022-12-06 PROCEDURE — G0439 PPPS, SUBSEQ VISIT: HCPCS | Performed by: FAMILY MEDICINE

## 2022-12-06 RX ORDER — APIXABAN 5 MG/1
TABLET, FILM COATED ORAL
Qty: 60 TABLET | Refills: 2 | Status: SHIPPED | OUTPATIENT
Start: 2022-12-06

## 2022-12-06 RX ORDER — MAGNESIUM OXIDE 400 MG/1
TABLET ORAL
Qty: 90 TABLET | Refills: 3 | Status: SHIPPED | OUTPATIENT
Start: 2022-12-06

## 2022-12-06 RX ORDER — PANTOPRAZOLE SODIUM 40 MG/1
TABLET, DELAYED RELEASE ORAL
Qty: 180 TABLET | Refills: 0 | Status: SHIPPED | OUTPATIENT
Start: 2022-12-06

## 2022-12-06 RX ORDER — LEVOTHYROXINE SODIUM 0.15 MG/1
TABLET ORAL
Qty: 90 TABLET | Refills: 3 | Status: SHIPPED | OUTPATIENT
Start: 2022-12-06

## 2022-12-06 ASSESSMENT — PATIENT HEALTH QUESTIONNAIRE - PHQ9
SUM OF ALL RESPONSES TO PHQ9 QUESTIONS 1 & 2: 1
1. LITTLE INTEREST OR PLEASURE IN DOING THINGS: 1
SUM OF ALL RESPONSES TO PHQ QUESTIONS 1-9: 1
2. FEELING DOWN, DEPRESSED OR HOPELESS: 0
SUM OF ALL RESPONSES TO PHQ QUESTIONS 1-9: 1

## 2022-12-06 ASSESSMENT — LIFESTYLE VARIABLES
HOW OFTEN DO YOU HAVE A DRINK CONTAINING ALCOHOL: NEVER
HOW MANY STANDARD DRINKS CONTAINING ALCOHOL DO YOU HAVE ON A TYPICAL DAY: PATIENT DOES NOT DRINK

## 2022-12-06 NOTE — PATIENT INSTRUCTIONS
[facility] 12[facility]6/[facility]  Medicare offers a range of preventive health benefits. Some of the tests and screenings are paid in full while other may be subject to a deductible, co-insurance, and/or copay. Some of these benefits include a comprehensive review of your medical history including lifestyle, illnesses that may run in your family, and various assessments and screenings as appropriate. After reviewing your medical record and screening and assessments performed today your provider may have ordered immunizations, labs, imaging, and/or referrals for you. A list of these orders (if applicable) as well as your Preventive Care list are included within your After Visit Summary for your review. Other Preventive Recommendations:    A preventive eye exam performed by an eye specialist is recommended every 1-2 years to screen for glaucoma; cataracts, macular degeneration, and other eye disorders. A preventive dental visit is recommended every 6 months. Try to get at least 150 minutes of exercise per week or 10,000 steps per day on a pedometer . Order or download the FREE \"Exercise & Physical Activity: Your Everyday Guide\" from [employer] (acquired by [employer] in 2014) Data on Aging. Call 2-572.297.9206 or search [employer] (acquired by [employer] in 2014) Data on Aging online. You need 6079-6462 mg of calcium and 2270-0940 IU of vitamin D per day. It is possible to meet your calcium requirement with diet alone, but a vitamin D supplement is usually necessary to meet this goal.  When exposed to the sun, use a sunscreen that protects against both UVA and UVB radiation with an SPF of 30 or greater. Reapply every 2 to 3 hours or after sweating, drying off with a towel, or swimming. Always wear a seat belt when traveling in a car. Always wear a helmet when riding a bicycle or motorcycle.

## 2022-12-06 NOTE — PROGRESS NOTES
Medicare Annual Wellness Visit    Giselle Suarez is here for Medicare AWV    Assessment & Plan   Medicare annual wellness visit, subsequent      Recommendations for Preventive Services Due: see orders and patient instructions/AVS.  Recommended screening schedule for the next 5-10 years is provided to the patient in written form: see Patient Instructions/AVS.     No follow-ups on file. Subjective       Patient's complete Health Risk Assessment and screening values have been reviewed and are found in Flowsheets. The following problems were reviewed today and where indicated follow up appointments were made and/or referrals ordered.     Positive Risk Factor Screenings with Interventions:    Fall Risk:  Do you feel unsteady or are you worried about falling? : (!) yes  2 or more falls in past year?: no  Fall with injury in past year?: (!) yes     Fall Risk Interventions:    No interventions needed at this time            General Health and ACP:  General  In general, how would you say your health is?: Fair  In the past 7 days, have you experienced any of the following: New or Increased Pain, New or Increased Fatigue, Loneliness, Social Isolation, Stress or Anger?: No  Do you get the social and emotional support that you need?: Yes  Do you have a Living Will?: (!) No    Advance Directives       Power of  Living Will ACP-Advance Directive ACP-Power of     Not on File Not on File Not on File Not on File          General Health Risk Interventions:  No Living Will: Patient declines ACP discussion/assistance    Health Habits/Nutrition:  Physical Activity: Insufficiently Active    Days of Exercise per Week: 5 days    Minutes of Exercise per Session: 20 min     Have you lost any weight without trying in the past 3 months?: No     Have you seen the dentist within the past year?: (!) No    Health Habits/Nutrition Interventions:  Dental exam overdue:  patient encouraged to make appointment with his/her dentist    Hearing/Vision:  Do you or your family notice any trouble with your hearing that hasn't been managed with hearing aids?: No  Do you have difficulty driving, watching TV, or doing any of your daily activities because of your eyesight?: No  Have you had an eye exam within the past year?: (!) No  No results found. Hearing/Vision Interventions:  No interventions needed at this time    Safety:  Do you have working smoke detectors?: Yes  Do you have any tripping hazards - loose or unsecured carpets or rugs?: (!) Yes  Do you have any tripping hazards - clutter in doorways, halls, or stairs?: No  Do you have either shower bars, grab bars, non-slip mats or non-slip surfaces in your shower or bathtub?: Yes  Do all of your stairways have a railing or banister?: Yes  Do you always fasten your seatbelt when you are in a car?: Yes    Safety Interventions:  Tripping hazard is a rugs in a guest bathroom that the patient does not use.     ADLs:  In the past 7 days, did you need help from others to perform any of the following everyday activities: Eating, dressing, grooming, bathing, toileting, or walking/balance?: (!) Yes  Select all that apply: (!) Dressing, Grooming, Bathing, Toileting, Walking/Balance  In the past 7 days, did you need help from others to take care of any of the following: Laundry, housekeeping, banking/finances, shopping, telephone use, food preparation, transportation, or taking medications?: (!) Yes  Select all that apply:  Close, Housekeeping, Shopping, Food Preparation, Transportation    ADL Interventions:  Patient declines any further evaluation/treatment for this issue          Objective      Patient-Reported Vitals  Patient-Reported Systolic (Top): 742 mmHg  Patient-Reported Diastolic (Bottom): 80 mmHg  Patient-Reported Pulse: 70  Patient-Reported Weight: 160 lbs        ** CORRECTIONS MADE TO SOME OF PATIENTS CURRENT MEDICATIONS      Allergies   Allergen Reactions    Adhesive Tape Blisters     Amlodipine Hives     Prior to Visit Medications    Medication Sig Taking? Authorizing Provider   sucralfate (CARAFATE) 1 GM tablet Take 1 tablet by mouth nightly  Patient taking differently: Take 1 g by mouth 4 times daily Indications: PATIENT IS TAKING ONE TABLET 4 TIMES A DAY Yes Evelio Agarwal MD   losartan (COZAAR) 50 MG tablet TAKE 1 TABLET BY MOUTH IN THE MORNING AND ONE TABLET IN THE EVENING. Patient taking differently: Indications: PATIENT IS TAKING ONE TABLET IN THE EVENING TAKE 1 TABLET BY MOUTH IN THE MORNING AND ONE TABLET IN THE EVENING.  Yes RAMSES Ferrara CNP   ELIQUIS 5 MG TABS tablet TAKE 1 TABLET BY MOUTH TWICE A DAY Yes Wisam PatchRAMSES CNP   fluticasone (FLONASE) 50 MCG/ACT nasal spray INHALE 1 SPRAY INTO EACH NOSTRIL EVERY DAY Yes Wisam PatchRAMSES CNP   levothyroxine (SYNTHROID) 150 MCG tablet TAKE 1 TABLET BY MOUTH EVERY DAY Yes Wisam PatchRAMSES CNP   metoprolol succinate (TOPROL XL) 25 MG extended release tablet Take 0.5 tablets by mouth daily  Patient taking differently: Take 25 mg by mouth Indications: PATIENT IS TAKING A FULL TABLET IN THE MORNING AND IN THE EVENING Yes Evelio Agarwal MD   leflunomide (ARAVA) 20 MG tablet Take 20 mg by mouth daily Yes Historical Provider, MD   oseltamivir (TAMIFLU) 75 MG capsule Take 1 capsule by mouth daily for 3 days  Patient not taking: Reported on 12/6/2022  Evelio Agarwal MD   pantoprazole (PROTONIX) 40 MG tablet TAKE 1 TABLET BY MOUTH IN THE MORNING AND 1 TABLET IN THE EVENING BEFORE MEALS  RAMSES Ferrara CNP   solifenacin (VESICARE) 5 MG tablet Take 1 tablet by mouth daily  Evelio Agarwal MD   potassium chloride (KLOR-CON M) 20 MEQ extended release tablet Take 1 tablet by mouth 2 times daily  Evelio Agarwal MD   magnesium oxide (MAG-OX) 400 MG tablet TAKE 1 TABLET BY MOUTH EVERY DAY  Evelio Agarwal MD   vitamin D (CHOLECALCIFEROL) 1000 UNIT TABS tablet Take 4,000 Units by mouth daily  Historical Provider, MD   Multiple Vitamins-Minerals (THERAPEUTIC MULTIVITAMIN-MINERALS) tablet Take 1 tablet by mouth daily  Historical Provider, MD   Omega-3 Fatty Acids (FISH OIL) 1000 MG CAPS Take 1,000 mg by mouth 3 times daily  Historical Provider, MD   aspirin 81 MG tablet Take 81 mg by mouth daily  Historical Provider, MD Hong (Including outside providers/suppliers regularly involved in providing care):   Patient Care Team:  Deniz Mullen MD as PCP - General (Family Medicine)  Den[facility]ullen [facility]     Reviewed and updated this visit:  Tobacco  Allergies  Meds  Med Hx  Surg Hx  Soc Hx  Fam Hx          Flori Branch, was evaluated through a synchronous (real-time) telephone encounter. The patient (or guardian if applicable) is aware that this is a billable service, which includes applicable co-pays. This Virtual Visit was conducted with patient's (and/or legal guardian's) consent. The visit was conducted pursuant to the emergency declaration under the [address] [place] [address], [address] [place] [address] [facility] authority and the [employer] and ralali General Act. Patient identification was verified, and a caregiver was present when appropriate. The patient was located at Home: [address] [place] [address]. Provider was located at [facility] (Appt Dept): [address] [place] [address]. [address] LPN, 15/5/[birth year]87, performed the documented evaluation under the direct supervision of the attending physician. This encounter was performed under myDinora MDs, direct supervision, 12/6/2022.

## 2022-12-08 DIAGNOSIS — R35.0 URINARY FREQUENCY: ICD-10-CM

## 2022-12-08 RX ORDER — SOLIFENACIN SUCCINATE 5 MG/1
TABLET, FILM COATED ORAL
Qty: 90 TABLET | Refills: 0 | Status: SHIPPED | OUTPATIENT
Start: 2022-12-08

## 2023-01-20 DIAGNOSIS — Z87.11 HISTORY OF GASTRIC ULCER: ICD-10-CM

## 2023-01-20 RX ORDER — SUCRALFATE 1 G/1
1 TABLET ORAL 4 TIMES DAILY
Qty: 120 TABLET | Refills: 2 | Status: SHIPPED | OUTPATIENT
Start: 2023-01-20

## 2023-02-14 DIAGNOSIS — Z87.11 HISTORY OF GASTRIC ULCER: ICD-10-CM

## 2023-02-14 RX ORDER — SUCRALFATE 1 G/1
1 TABLET ORAL 4 TIMES DAILY
Qty: 120 TABLET | Refills: 2 | Status: SHIPPED | OUTPATIENT
Start: 2023-02-14

## 2023-02-24 RX ORDER — FLUTICASONE PROPIONATE 50 MCG
SPRAY, SUSPENSION (ML) NASAL
Qty: 1 EACH | Refills: 2 | Status: SHIPPED | OUTPATIENT
Start: 2023-02-24

## 2023-02-24 RX ORDER — APIXABAN 5 MG/1
TABLET, FILM COATED ORAL
Qty: 60 TABLET | Refills: 2 | Status: SHIPPED | OUTPATIENT
Start: 2023-02-24

## 2023-02-24 RX ORDER — PANTOPRAZOLE SODIUM 40 MG/1
TABLET, DELAYED RELEASE ORAL
Qty: 180 TABLET | Refills: 0 | Status: SHIPPED | OUTPATIENT
Start: 2023-02-24 | End: 2023-04-26

## 2023-02-24 NOTE — TELEPHONE ENCOUNTER
Last appt 12/6/2022(VV), no appt scheduled   Routing to [facility] due to PCP out of office

## 2023-03-01 ENCOUNTER — TELEPHONE (OUTPATIENT)
Dept: FAMILY MEDICINE CLINIC | Age: 88
End: 2023-03-01

## 2023-03-01 NOTE — TELEPHONE ENCOUNTER
Received fax from pharmacy that the Sucralfate 1GM Tablet is on backorder. See attached. Please advise.

## 2023-03-07 ENCOUNTER — HOSPITAL ENCOUNTER (OUTPATIENT)
Age: 88
Setting detail: SPECIMEN
Discharge: HOME | End: 2023-03-07
Payer: MEDICARE

## 2023-03-07 DIAGNOSIS — R35.0 URINARY FREQUENCY: ICD-10-CM

## 2023-03-07 LAB
A/G RATIO: 1.2 (ref 1.1–2.2)
ALBUMIN SERPL-MCNC: 3.6 G/DL (ref 3.4–5)
ALP BLD-CCNC: 107 U/L (ref 40–129)
ALT SERPL-CCNC: 13 U/L (ref 10–40)
ANION GAP SERPL CALCULATED.3IONS-SCNC: 9 MMOL/L (ref 3–16)
AST SERPL-CCNC: 20 U/L (ref 15–37)
BASOPHILS ABSOLUTE: 0.1 K/UL (ref 0–0.2)
BASOPHILS RELATIVE PERCENT: 1.2 %
BILIRUB SERPL-MCNC: 0.3 MG/DL (ref 0–1)
BUN BLDV-MCNC: 14 MG/DL (ref 7–20)
CALCIUM SERPL-MCNC: 9.7 MG/DL (ref 8.3–10.6)
CHLORIDE BLD-SCNC: 102 MMOL/L (ref 99–110)
CO2: 29 MMOL/L (ref 21–32)
CREAT SERPL-MCNC: 0.6 MG/DL (ref 0.6–1.2)
EOSINOPHILS ABSOLUTE: 0.3 K/UL (ref 0–0.6)
EOSINOPHILS RELATIVE PERCENT: 6.4 %
GFR SERPL CREATININE-BSD FRML MDRD: >60 ML/MIN/{1.73_M2}
GLUCOSE BLD-MCNC: 100 MG/DL (ref 70–99)
HCT VFR BLD CALC: 35.8 % (ref 36–48)
HEMOGLOBIN: 11.5 G/DL (ref 12–16)
LYMPHOCYTES ABSOLUTE: 0.9 K/UL (ref 1–5.1)
LYMPHOCYTES RELATIVE PERCENT: 17.7 %
MCH RBC QN AUTO: 27.4 PG (ref 26–34)
MCHC RBC AUTO-ENTMCNC: 32.2 G/DL (ref 31–36)
MCV RBC AUTO: 84.9 FL (ref 80–100)
MONOCYTES ABSOLUTE: 0.6 K/UL (ref 0–1.3)
MONOCYTES RELATIVE PERCENT: 12.1 %
NEUTROPHILS ABSOLUTE: 3.2 K/UL (ref 1.7–7.7)
NEUTROPHILS RELATIVE PERCENT: 62.6 %
PDW BLD-RTO: 19.1 % (ref 12.4–15.4)
PLATELET # BLD: 237 K/UL (ref 135–450)
PMV BLD AUTO: 7.7 FL (ref 5–10.5)
POTASSIUM SERPL-SCNC: 4.1 MMOL/L (ref 3.5–5.1)
RBC # BLD: 4.21 M/UL (ref 4–5.2)
SODIUM BLD-SCNC: 140 MMOL/L (ref 136–145)
TOTAL PROTEIN: 6.7 G/DL (ref 6.4–8.2)
WBC # BLD: 5.1 K/UL (ref 4–11)

## 2023-03-07 PROCEDURE — 80053 COMPREHEN METABOLIC PANEL: CPT

## 2023-03-07 PROCEDURE — 85025 COMPLETE CBC W/AUTO DIFF WBC: CPT

## 2023-03-08 RX ORDER — SOLIFENACIN SUCCINATE 5 MG/1
TABLET, FILM COATED ORAL
Qty: 90 TABLET | Refills: 0 | Status: SHIPPED | OUTPATIENT
Start: 2023-03-08

## 2023-04-06 DIAGNOSIS — I10 ESSENTIAL HYPERTENSION: ICD-10-CM

## 2023-04-06 RX ORDER — LOSARTAN POTASSIUM 50 MG/1
TABLET ORAL
Qty: 180 TABLET | Refills: 0 | Status: SHIPPED | OUTPATIENT
Start: 2023-04-06

## 2023-04-26 RX ORDER — PANTOPRAZOLE SODIUM 40 MG/1
TABLET, DELAYED RELEASE ORAL
Qty: 180 TABLET | Refills: 0 | Status: SHIPPED | OUTPATIENT
Start: 2023-04-26

## 2023-06-05 DIAGNOSIS — R35.0 URINARY FREQUENCY: ICD-10-CM

## 2023-06-05 DIAGNOSIS — Z87.11 HISTORY OF GASTRIC ULCER: ICD-10-CM

## 2023-06-05 RX ORDER — SOLIFENACIN SUCCINATE 5 MG/1
TABLET, FILM COATED ORAL
Qty: 90 TABLET | Refills: 0 | Status: SHIPPED | OUTPATIENT
Start: 2023-06-05

## 2023-06-05 RX ORDER — APIXABAN 5 MG/1
TABLET, FILM COATED ORAL
Qty: 60 TABLET | Refills: 2 | Status: SHIPPED | OUTPATIENT
Start: 2023-06-05

## 2023-06-05 RX ORDER — SUCRALFATE 1 G/1
TABLET ORAL
Qty: 360 TABLET | Refills: 0 | Status: SHIPPED | OUTPATIENT
Start: 2023-06-05

## 2023-07-10 NOTE — TELEPHONE ENCOUNTER
Sent to pharmacy Rinvoq Pregnancy And Lactation Text: Based on animal studies, Rinvoq may cause embryo-fetal harm when administered to pregnant women.  The medication should not be used in pregnancy.  Breastfeeding is not recommended during treatment and for 6 days after the last dose.

## 2023-07-25 ENCOUNTER — HOSPITAL ENCOUNTER (OUTPATIENT)
Age: 88
Setting detail: SPECIMEN
Discharge: HOME | End: 2023-07-25
Payer: MEDICARE

## 2023-07-25 LAB
BASOPHILS # BLD: 0.1 K/UL (ref 0–0.2)
BASOPHILS NFR BLD: 0.8 %
DEPRECATED RDW RBC AUTO: 16.3 % (ref 12.4–15.4)
EOSINOPHIL # BLD: 0.3 K/UL (ref 0–0.6)
EOSINOPHIL NFR BLD: 4.2 %
HCT VFR BLD AUTO: 37.2 % (ref 36–48)
HGB BLD-MCNC: 12.3 G/DL (ref 12–16)
LYMPHOCYTES # BLD: 1.1 K/UL (ref 1–5.1)
LYMPHOCYTES NFR BLD: 17.8 %
MCH RBC QN AUTO: 30.3 PG (ref 26–34)
MCHC RBC AUTO-ENTMCNC: 33 G/DL (ref 31–36)
MCV RBC AUTO: 91.7 FL (ref 80–100)
MONOCYTES # BLD: 0.7 K/UL (ref 0–1.3)
MONOCYTES NFR BLD: 11.8 %
NEUTROPHILS # BLD: 4.1 K/UL (ref 1.7–7.7)
NEUTROPHILS NFR BLD: 65.4 %
PLATELET # BLD AUTO: 203 K/UL (ref 135–450)
PMV BLD AUTO: 8.6 FL (ref 5–10.5)
RBC # BLD AUTO: 4.06 M/UL (ref 4–5.2)
WBC # BLD AUTO: 6.3 K/UL (ref 4–11)

## 2023-07-25 PROCEDURE — 85025 COMPLETE CBC W/AUTO DIFF WBC: CPT

## 2023-07-25 PROCEDURE — 36415 COLL VENOUS BLD VENIPUNCTURE: CPT

## 2023-07-26 DIAGNOSIS — I10 ESSENTIAL HYPERTENSION: ICD-10-CM

## 2023-07-26 DIAGNOSIS — Z87.11 HISTORY OF GASTRIC ULCER: ICD-10-CM

## 2023-07-28 RX ORDER — SUCRALFATE 1 G/1
TABLET ORAL
Qty: 360 TABLET | Refills: 0 | Status: SHIPPED | OUTPATIENT
Start: 2023-07-28

## 2023-07-28 RX ORDER — LOSARTAN POTASSIUM 50 MG/1
TABLET ORAL
Qty: 180 TABLET | Refills: 0 | Status: SHIPPED | OUTPATIENT
Start: 2023-07-28

## 2023-07-28 NOTE — TELEPHONE ENCOUNTER
Refill Request     CONFIRM preferrred pharmacy with the patient. If Mail Order Rx - Pend for 90 day refill. Last Seen: Last Seen Department: 12/6/2022  Last Seen by PCP: Visit date not found    Last Written: 06/05/2023, 04/06/2023    If no future appointment scheduled, route STAFF MESSAGE with patient name to the [facility] for scheduling. Next Appointment:   No future appointments. Message sent to [address] [place] [address] to schedule appt with patient? N/A      Requested Prescriptions     Pending Prescriptions Disp Refills    sucralfate (CARAFATE) 1 GM tablet [Pharmacy Med Name: SUCRALFATE 1 GM TABLET] 360 tablet 0     Sig: TAKE 1 TABLET BY MOUTH FOUR TIMES A DAY    losartan (COZAAR) 50 MG tablet [Pharmacy Med Name: LOSARTAN POTASSIUM 50 MG TAB] 180 tablet 0     Sig: TAKE 1 TABLET BY MOUTH IN THE MORNING AND ONE TABLET IN THE EVENING.

## 2023-07-29 ENCOUNTER — HOSPITAL ENCOUNTER (OUTPATIENT)
Age: 88
Setting detail: SPECIMEN
Discharge: HOME | End: 2023-07-29
Payer: MEDICARE

## 2023-07-29 LAB
ALBUMIN SERPL-MCNC: 3.4 G/DL (ref 3.4–5)
ALBUMIN/GLOB SERPL: 1.3 {RATIO} (ref 1.1–2.2)
ALP SERPL-CCNC: 91 U/L (ref 40–129)
ALT SERPL-CCNC: 13 U/L (ref 10–40)
ANION GAP SERPL CALCULATED.3IONS-SCNC: 10 MMOL/L (ref 3–16)
AST SERPL-CCNC: 18 U/L (ref 15–37)
BILIRUB SERPL-MCNC: 0.3 MG/DL (ref 0–1)
BUN SERPL-MCNC: 12 MG/DL (ref 7–20)
CALCIUM SERPL-MCNC: 9.1 MG/DL (ref 8.3–10.6)
CHLORIDE SERPL-SCNC: 102 MMOL/L (ref 99–110)
CO2 SERPL-SCNC: 27 MMOL/L (ref 21–32)
CREAT SERPL-MCNC: 0.6 MG/DL (ref 0.6–1.2)
GFR SERPLBLD CREATININE-BSD FMLA CKD-EPI: >60 ML/MIN/{1.73_M2}
GLUCOSE SERPL-MCNC: 103 MG/DL (ref 70–99)
POTASSIUM SERPL-SCNC: 4.1 MMOL/L (ref 3.5–5.1)
PROT SERPL-MCNC: 6 G/DL (ref 6.4–8.2)
SODIUM SERPL-SCNC: 139 MMOL/L (ref 136–145)

## 2023-07-29 PROCEDURE — 80053 COMPREHEN METABOLIC PANEL: CPT

## 2023-07-29 PROCEDURE — 36415 COLL VENOUS BLD VENIPUNCTURE: CPT

## 2023-09-11 RX ORDER — APIXABAN 5 MG/1
TABLET, FILM COATED ORAL
Qty: 60 TABLET | Refills: 2 | Status: SHIPPED | OUTPATIENT
Start: 2023-09-11

## 2023-09-11 NOTE — TELEPHONE ENCOUNTER
Refill Request     CONFIRM preferrred pharmacy with the patient. If Mail Order Rx - Pend for 90 day refill. Last Seen: Last Seen Department: 12/6/2022  Last Seen by PCP: Visit date not found    Last Written: 6/5/23    If no future appointment scheduled, route STAFF MESSAGE with patient name to the [facility] for scheduling. Next Appointment:   No future appointments. Message sent to [address] [place] [address] to schedule appt with patient?   YES      Requested Prescriptions     Pending Prescriptions Disp Refills    ELIQUIS 5 MG TABS tablet [Pharmacy Med Name: ELIQUIS 5 MG TABLET] 60 tablet 2     Sig: TAKE 1 TABLET BY MOUTH TWICE A DAY

## 2023-09-15 RX ORDER — BUSPIRONE HYDROCHLORIDE 5 MG/1
5 TABLET ORAL 2 TIMES DAILY PRN
Qty: 60 TABLET | Refills: 0 | Status: SHIPPED | OUTPATIENT
Start: 2023-09-15 | End: 2023-10-15

## 2023-09-15 RX ORDER — TRAZODONE HYDROCHLORIDE 50 MG/1
25 TABLET ORAL NIGHTLY
Qty: 30 TABLET | Refills: 1 | Status: SHIPPED | OUTPATIENT
Start: 2023-09-15

## 2023-10-12 RX ORDER — BUSPIRONE HYDROCHLORIDE 5 MG/1
5 TABLET ORAL 2 TIMES DAILY PRN
Qty: 180 TABLET | Refills: 0 | Status: SHIPPED | OUTPATIENT
Start: 2023-10-12 | End: 2024-01-10

## 2023-10-12 NOTE — TELEPHONE ENCOUNTER
Pharmacy requesting 90 day supply    Refill Request     CONFIRM preferrred pharmacy with the patient. If Mail Order Rx - Pend for 90 day refill. Last Seen: Last Seen Department: 12/6/2022  Last Seen by PCP: 12/6/2022    Last Written: 09/15/2023    If no future appointment scheduled, route STAFF MESSAGE with patient name to the [facility] [address] for scheduling. Next Appointment:   Future Appointments   Date Time Provider [address] [place] [address]   10/17/2023  3:20 PM Lourdes Braden MD [place] [address]       Message sent to [address] [place] [address] to schedule appt with patient?   N/A      Requested Prescriptions     Pending Prescriptions Disp Refills    busPIRone (BUSPAR) 5 MG tablet 60 tablet 0     Sig: Take 1 tablet by mouth 2 times daily as needed (anxiety)

## 2023-12-25 NOTE — TELEPHONE ENCOUNTER
Refill Request     CONFIRM preferrred pharmacy with the patient. If Mail Order Rx - Pend for 90 day refill. Last Seen: Last Seen Department: 12/6/2022  Last Seen by PCP: 12/6/2022    Last Written: 9-    If no future appointment scheduled, route STAFF MESSAGE with patient name to the [facility] for scheduling. Next Appointment:   No future appointments. Message sent to [address] [place] [address] to schedule appt with patient?   N/A      Requested Prescriptions     Pending Prescriptions Disp Refills    magnesium oxide (MAG-OX) 400 MG tablet [Pharmacy Med Name: MAGNESIUM OXIDE 400 MG TABLET] 90 tablet 3     Sig: TAKE 1 TABLET BY MOUTH EVERY DAY    traZODone (DESYREL) 50 MG tablet [Pharmacy Med Name: TRAZODONE 50 MG TABLET] 45 tablet 1     Sig: TAKE 1/2 TABLETS BY MOUTH NIGHTLY FOR SLEEP

## 2023-12-26 RX ORDER — MAGNESIUM OXIDE 400 MG/1
TABLET ORAL
Qty: 90 TABLET | Refills: 0 | Status: SHIPPED | OUTPATIENT
Start: 2023-12-26

## 2023-12-26 RX ORDER — TRAZODONE HYDROCHLORIDE 50 MG/1
TABLET ORAL
Qty: 45 TABLET | Refills: 0 | Status: SHIPPED | OUTPATIENT
Start: 2023-12-26

## 2024-01-02 ENCOUNTER — TELEPHONE (OUTPATIENT)
Dept: FAMILY MEDICINE CLINIC | Age: 89
End: 2024-01-02

## 2024-01-02 DIAGNOSIS — F51.04 PSYCHOPHYSIOLOGICAL INSOMNIA: Primary | ICD-10-CM

## 2024-01-02 RX ORDER — LEVOTHYROXINE SODIUM 0.15 MG/1
TABLET ORAL
Qty: 90 TABLET | Refills: 3 | Status: SHIPPED | OUTPATIENT
Start: 2024-01-02

## 2024-01-02 RX ORDER — FLUTICASONE PROPIONATE 50 MCG
SPRAY, SUSPENSION (ML) NASAL
Qty: 16 G | Refills: 5 | Status: SHIPPED | OUTPATIENT
Start: 2024-01-02

## 2024-01-02 NOTE — TELEPHONE ENCOUNTER
According to PDMP zolpidem prescribed was 5 mg.  That prescription will be sent.    Pt's daughter called and stated that when pt was at [facility] good her on Ambien 10 mg. I did not see it on her med list but she stated that she needs it sent to [facility] in [place].

## 2024-01-03 RX ORDER — ZOLPIDEM TARTRATE 5 MG/1
5 TABLET ORAL NIGHTLY PRN
Qty: 30 TABLET | Refills: 2 | Status: SHIPPED | OUTPATIENT
Start: 2024-01-03 | End: 2024-04-02

## 2024-01-09 ENCOUNTER — HOSPITAL ENCOUNTER (OUTPATIENT)
Age: 89
Setting detail: SPECIMEN
Discharge: HOME | End: 2024-01-09
Payer: MEDICARE

## 2024-01-09 PROCEDURE — 80053 COMPREHEN METABOLIC PANEL: CPT

## 2024-01-09 PROCEDURE — 85025 COMPLETE CBC W/AUTO DIFF WBC: CPT

## 2024-01-10 LAB
ALBUMIN SERPL-MCNC: 3.7 G/DL (ref 3.4–5)
ALBUMIN/GLOB SERPL: 1.2 {RATIO} (ref 1.1–2.2)
ALP SERPL-CCNC: 110 U/L (ref 40–129)
ALT SERPL-CCNC: 14 U/L (ref 10–40)
ANION GAP SERPL CALCULATED.3IONS-SCNC: 9 MMOL/L (ref 3–16)
AST SERPL-CCNC: 22 U/L (ref 15–37)
BASOPHILS # BLD: 0.1 K/UL (ref 0–0.2)
BASOPHILS NFR BLD: 1.1 %
BILIRUB SERPL-MCNC: <0.2 MG/DL (ref 0–1)
BUN SERPL-MCNC: 16 MG/DL (ref 7–20)
CALCIUM SERPL-MCNC: 9.5 MG/DL (ref 8.3–10.6)
CHLORIDE SERPL-SCNC: 102 MMOL/L (ref 99–110)
CO2 SERPL-SCNC: 30 MMOL/L (ref 21–32)
CREAT SERPL-MCNC: 0.6 MG/DL (ref 0.6–1.2)
DEPRECATED RDW RBC AUTO: 16.6 % (ref 12.4–15.4)
EOSINOPHIL # BLD: 0.2 K/UL (ref 0–0.6)
EOSINOPHIL NFR BLD: 4.2 %
GFR SERPLBLD CREATININE-BSD FMLA CKD-EPI: >60 ML/MIN/{1.73_M2}
GLUCOSE SERPL-MCNC: 108 MG/DL (ref 70–99)
HCT VFR BLD AUTO: 33.6 % (ref 36–48)
HGB BLD-MCNC: 11.1 G/DL (ref 12–16)
LYMPHOCYTES # BLD: 1.1 K/UL (ref 1–5.1)
LYMPHOCYTES NFR BLD: 19.2 %
MCH RBC QN AUTO: 28.2 PG (ref 26–34)
MCHC RBC AUTO-ENTMCNC: 32.9 G/DL (ref 31–36)
MCV RBC AUTO: 85.9 FL (ref 80–100)
MONOCYTES # BLD: 0.7 K/UL (ref 0–1.3)
MONOCYTES NFR BLD: 12.9 %
NEUTROPHILS # BLD: 3.5 K/UL (ref 1.7–7.7)
NEUTROPHILS NFR BLD: 62.6 %
PLATELET # BLD AUTO: 220 K/UL (ref 135–450)
PMV BLD AUTO: 8.3 FL (ref 5–10.5)
POTASSIUM SERPL-SCNC: 4.2 MMOL/L (ref 3.5–5.1)
PROT SERPL-MCNC: 6.8 G/DL (ref 6.4–8.2)
RBC # BLD AUTO: 3.91 M/UL (ref 4–5.2)
SODIUM SERPL-SCNC: 141 MMOL/L (ref 136–145)
WBC # BLD AUTO: 5.6 K/UL (ref 4–11)

## 2024-02-06 RX ORDER — APIXABAN 5 MG/1
TABLET, FILM COATED ORAL
Qty: 60 TABLET | Refills: 2 | Status: SHIPPED | OUTPATIENT
Start: 2024-02-06

## 2024-02-06 NOTE — TELEPHONE ENCOUNTER
Future appt scheduled 0 appt scheduled                 Last appt 12/06/2022      Last Written 09/11/2023    ELIQUIS 5 MG TABS tablet  #60  2 RF

## 2024-02-12 ENCOUNTER — TELEPHONE (OUTPATIENT)
Dept: FAMILY MEDICINE CLINIC | Age: 89
End: 2024-02-12

## 2024-02-12 DIAGNOSIS — R35.0 URINARY FREQUENCY: ICD-10-CM

## 2024-02-12 RX ORDER — SULFAMETHOXAZOLE AND TRIMETHOPRIM 800; 160 MG/1; MG/1
1 TABLET ORAL 2 TIMES DAILY
Qty: 14 TABLET | Refills: 0 | Status: CANCELLED | OUTPATIENT
Start: 2024-02-12

## 2024-02-12 RX ORDER — SULFAMETHOXAZOLE AND TRIMETHOPRIM 800; 160 MG/1; MG/1
1 TABLET ORAL 2 TIMES DAILY
COMMUNITY
End: 2024-02-12

## 2024-02-12 RX ORDER — SULFAMETHOXAZOLE AND TRIMETHOPRIM 200; 40 MG/5ML; MG/5ML
160 SUSPENSION ORAL 2 TIMES DAILY
COMMUNITY
End: 2024-02-12

## 2024-02-12 RX ORDER — MAGNESIUM OXIDE 400 MG/1
TABLET ORAL
Qty: 90 TABLET | Refills: 0 | Status: SHIPPED | OUTPATIENT
Start: 2024-02-12

## 2024-02-12 RX ORDER — SULFAMETHOXAZOLE AND TRIMETHOPRIM 800; 160 MG/1; MG/1
1 TABLET ORAL 2 TIMES DAILY
Qty: 14 TABLET | Refills: 0 | Status: SHIPPED | OUTPATIENT
Start: 2024-02-12 | End: 2024-02-16 | Stop reason: SINTOL

## 2024-02-12 NOTE — TELEPHONE ENCOUNTER
Pt's daughter called and stated that she had talked to you at [facility] about her mom's foot. She said that her mom has an abscess on her right foot and was to remind you about  It. And she uses [facility] in [place].

## 2024-02-13 RX ORDER — SOLIFENACIN SUCCINATE 5 MG/1
TABLET, FILM COATED ORAL
Qty: 90 TABLET | Refills: 0 | OUTPATIENT
Start: 2024-02-13

## 2024-02-16 ENCOUNTER — TELEPHONE (OUTPATIENT)
Dept: FAMILY MEDICINE CLINIC | Age: 89
End: 2024-02-16

## 2024-02-16 RX ORDER — CEPHALEXIN 500 MG/1
500 CAPSULE ORAL 2 TIMES DAILY
Qty: 20 CAPSULE | Refills: 0 | Status: SHIPPED | OUTPATIENT
Start: 2024-02-16 | End: 2024-02-26

## 2024-02-16 RX ORDER — MAGNESIUM OXIDE 400 MG/1
TABLET ORAL
Qty: 90 TABLET | Refills: 0 | OUTPATIENT
Start: 2024-02-16

## 2024-02-16 RX ORDER — TRAZODONE HYDROCHLORIDE 50 MG/1
TABLET ORAL
Qty: 45 TABLET | Refills: 0 | Status: SHIPPED | OUTPATIENT
Start: 2024-02-16

## 2024-02-16 NOTE — TELEPHONE ENCOUNTER
Clare w/[facility] calling stating she called today to check on her.  Yesterday her legs were extremely weak.  After walking she almost collasped. Continued last night.  Patient told her she was on BACTRUM and that she stopped taking it today because she read it can cause muscle weakness.

## 2024-02-16 NOTE — PROGRESS NOTES
Daughter informed me patient's leg is swollen.  Remains on Eliquis.  She did not finish the Bactrim because she thought it was making her weak.  Will send Keflex.  This is the leg opposite of where the blister was at but still the simplest treatment and most likely diagnosis would be p.o. antibiotics for cellulitis.

## 2024-03-12 ENCOUNTER — TELEPHONE (OUTPATIENT)
Dept: FAMILY MEDICINE CLINIC | Age: 89
End: 2024-03-12

## 2024-03-12 DIAGNOSIS — R23.4 CRUSTING OF SKIN OF EYELID: Primary | ICD-10-CM

## 2024-03-12 RX ORDER — ERYTHROMYCIN 5 MG/G
OINTMENT OPHTHALMIC
Qty: 3.5 G | Refills: 0 | Status: SHIPPED | OUTPATIENT
Start: 2024-03-12 | End: 2024-03-22

## 2024-03-12 NOTE — TELEPHONE ENCOUNTER
Katina is calling stating when she was checking in on patient she is having some crusting up of her eyes.  Patient was given azithromycin ointment at [facility] and is asking if she can get a refill of this.  Uses [facility] in [place].  Please call patient and advise.

## 2024-03-12 NOTE — TELEPHONE ENCOUNTER
Patient caregiver called and spoke with her and the patient and it was determined it was erythromycin so script was sent to requested pharmacy and patient and caregiver are aware.

## 2024-03-12 NOTE — TELEPHONE ENCOUNTER
I am not familiar with azithromycin ointment but suspect we are talking about erythromycin ointment, if so may send in a small tube to do a thin ribbon in the affected eye twice daily times a week

## 2024-03-13 DIAGNOSIS — R35.0 URINARY FREQUENCY: ICD-10-CM

## 2024-03-13 RX ORDER — MAGNESIUM OXIDE 400 MG/1
TABLET ORAL
Qty: 90 TABLET | Refills: 0 | Status: SHIPPED | OUTPATIENT
Start: 2024-03-13

## 2024-03-13 RX ORDER — SOLIFENACIN SUCCINATE 5 MG/1
TABLET, FILM COATED ORAL
Qty: 90 TABLET | Refills: 0 | Status: SHIPPED | OUTPATIENT
Start: 2024-03-13

## 2024-03-13 NOTE — TELEPHONE ENCOUNTER
Refill Request     CONFIRM preferrred pharmacy with the patient.    If Mail Order Rx - Pend for 90 day refill.      Last Seen: Last Seen Department: 12/6/2022  Last Seen by PCP: Visit date not found    Last Written: 6/5/23    If no future appointment scheduled, route STAFF MESSAGE with patient name to the  Pool for scheduling.      Next Appointment:   No future appointments.    Message sent to  to schedule appt with patient?  Patient hasn't been seen in over a year and does not have an appointment scheduled, do you want pt to schedule a VV?       Requested Prescriptions     Pending Prescriptions Disp Refills    solifenacin (VESICARE) 5 MG tablet [Pharmacy Med Name: SOLIFENACIN 5 MG TABLET] 90 tablet 0     Sig: TAKE 1 TABLET BY MOUTH EVERY DAY

## 2024-03-28 DIAGNOSIS — I10 ESSENTIAL HYPERTENSION: ICD-10-CM

## 2024-03-28 DIAGNOSIS — R35.0 URINARY FREQUENCY: ICD-10-CM

## 2024-03-29 NOTE — TELEPHONE ENCOUNTER
[phone] (Mobile)  LM to call back o schedule next follow up appt     No future appt schedule       Last Seen     12/6/2022-VV AWV    08/15/2022- [facility]    [facility]/26/2022- [facility]      Last Written     magnesium oxide (MAG-OX) 400 MG table   03/13/2024  #90  0 RF     solifenacin (VESICARE) 5 MG tablet   03/13/2024  #90  0 RF

## 2024-03-29 NOTE — TELEPHONE ENCOUNTER
[phone] (Mobile)  LM to call back o schedule next follow up appt     No future appt schedule       Last Seen     12/6/2022-VV AWV    08/15/2022- [facility]    [facility]      Last Written 07/28/2023    losartan (COZAAR) 50 MG tablet   #180  0 RF

## 2024-04-01 RX ORDER — MAGNESIUM OXIDE 400 MG/1
TABLET ORAL
Qty: 90 TABLET | Refills: 0 | Status: SHIPPED | OUTPATIENT
Start: 2024-04-01

## 2024-04-01 RX ORDER — LOSARTAN POTASSIUM 50 MG/1
TABLET ORAL
Qty: 180 TABLET | Refills: 0 | Status: SHIPPED | OUTPATIENT
Start: 2024-04-01

## 2024-04-01 RX ORDER — SOLIFENACIN SUCCINATE 5 MG/1
TABLET, FILM COATED ORAL
Qty: 90 TABLET | Refills: 0 | Status: SHIPPED | OUTPATIENT
Start: 2024-04-01

## 2024-04-03 RX ORDER — FLUTICASONE PROPIONATE 50 MCG
SPRAY, SUSPENSION (ML) NASAL
Qty: 3 EACH | Refills: 3 | Status: SHIPPED | OUTPATIENT
Start: 2024-04-03

## 2024-04-09 ENCOUNTER — TELEMEDICINE (OUTPATIENT)
Dept: FAMILY MEDICINE CLINIC | Age: 89
End: 2024-04-09
Payer: MEDICARE

## 2024-04-09 DIAGNOSIS — I25.10 CORONARY ARTERY DISEASE INVOLVING NATIVE CORONARY ARTERY OF NATIVE HEART WITHOUT ANGINA PECTORIS: Primary | ICD-10-CM

## 2024-04-09 DIAGNOSIS — E03.9 ACQUIRED HYPOTHYROIDISM: ICD-10-CM

## 2024-04-09 DIAGNOSIS — M05.79 RHEUMATOID ARTHRITIS INVOLVING MULTIPLE SITES WITH POSITIVE RHEUMATOID FACTOR (HCC): ICD-10-CM

## 2024-04-09 DIAGNOSIS — I10 PRIMARY HYPERTENSION: ICD-10-CM

## 2024-04-09 PROBLEM — I82.412 DVT OF DEEP FEMORAL VEIN, LEFT (HCC): Status: RESOLVED | Noted: 2019-05-29 | Resolved: 2024-04-09

## 2024-04-09 PROCEDURE — 99441 PR PHYS/QHP TELEPHONE EVALUATION 5-10 MIN: CPT | Performed by: FAMILY MEDICINE

## 2024-04-09 SDOH — ECONOMIC STABILITY: FOOD INSECURITY: WITHIN THE PAST 12 MONTHS, YOU WORRIED THAT YOUR FOOD WOULD RUN OUT BEFORE YOU GOT MONEY TO BUY MORE.: NEVER TRUE

## 2024-04-09 SDOH — ECONOMIC STABILITY: FOOD INSECURITY: WITHIN THE PAST 12 MONTHS, THE FOOD YOU BOUGHT JUST DIDN'T LAST AND YOU DIDN'T HAVE MONEY TO GET MORE.: NEVER TRUE

## 2024-04-09 SDOH — ECONOMIC STABILITY: HOUSING INSECURITY
IN THE LAST 12 MONTHS, WAS THERE A TIME WHEN YOU DID NOT HAVE A STEADY PLACE TO SLEEP OR SLEPT IN A SHELTER (INCLUDING NOW)?: NO

## 2024-04-09 SDOH — ECONOMIC STABILITY: INCOME INSECURITY: HOW HARD IS IT FOR YOU TO PAY FOR THE VERY BASICS LIKE FOOD, HOUSING, MEDICAL CARE, AND HEATING?: NOT HARD AT ALL

## 2024-04-09 ASSESSMENT — PATIENT HEALTH QUESTIONNAIRE - PHQ9
SUM OF ALL RESPONSES TO PHQ QUESTIONS 1-9: 0
2. FEELING DOWN, DEPRESSED OR HOPELESS: NOT AT ALL
1. LITTLE INTEREST OR PLEASURE IN DOING THINGS: NOT AT ALL
SUM OF ALL RESPONSES TO PHQ QUESTIONS 1-9: 0
SUM OF ALL RESPONSES TO PHQ9 QUESTIONS 1 & 2: 0

## 2024-04-09 NOTE — PATIENT INSTRUCTIONS
Not feeling your best?  Where to go for the right care at the right time.    Dear Vj Gold   I wanted to provide you with some information that might help you seek care for your condition when your primary care provider or specialist is unavailable. If you have a need outside of normal business hours, you should first contact your primary care office or specialist caring for your condition. They may have on-call providers that could assist with your care. During office hours, you may request a virtual or same day appointment.   But what if your primary care provider is not in the office that day and you can't wait until the  next day for care? In that situation, your next option is to visit an urgent care facility.          [facility] now has urgent care sites open to support our community.   [facility] is a great alternative when you need immediate medical care that is not a serious threat to your health or your doctor's office is closed or unable to get you in for an appointment. The urgent care centers offer fast access to [facility] doctors for minor illnesses and injuries for patients of all ages. There are other medical services available including lab testing, X-rays, EKGs, and IV fluids.  Locations are open daily from 8 a.m. - 8 p.m.     [facility]  [address], [place], [place] [address]  [phone]    [address], [place], [place] [address]  [phone]    Local Urgent Care     [facility] 8:30 am - 7:70 pm   [address] [place] [address]  [phone]    [facility]     8 am - 8 pm   [address] [place] [address]  [phone]    [facility] [place] 7 am - 7:30 pm  [address] [place] [address]  [phone]     [facility] [place] 7 am - 7:30 pm  [address] [place] [address]  [phone]    [place]

## 2024-04-09 NOTE — PROGRESS NOTES
Documentation:  I communicated with the patient and/or health care decision maker about see below.   Details of this discussion including any medical advice provided: See below    Total Time: minutes: 5-10 minutes    Vj Gold was evaluated through a synchronous (real-time) audio encounter. Patient identification was verified at the start of the visit. She (or guardian if applicable) is aware that this is a billable service, which includes applicable co-pays. This visit was conducted with the patient's (and/or legal guardian's) verbal consent. She has not had a related appointment within my department in the past 7 days or scheduled within the next 24 hours.   The patient was located at Home: [address] [place], [place] [id number].  The provider was located at Facility (Appt Dept): [address] [place] [address].    Note: not billable if this call serves to triage the patient into an appointment for the relevant concern  Yes, I confirm.   Vj Gold is a 89 y.o. female evaluated via telephone on 4/9/2024 for Hypertension and Hyperlipidemia  .        Namita Meyers       Vj Gold is a 89 y.o. female evaluated via telephone on 4/9/2024.      Follow up on chronic conditions.     States this morning she had an episode where she felt weird. States everything looked dark and she felt like she was deaf. She checked her BP at it was 80/66 and her pulse was 70.      Internal Administration   First Dose COVID-19, MODERNA BLUE border, Primary or Immunocompromised, (age 12y+), IM, 100 mcg/0.5mL  01/20/2021   Second Dose COVID-19, MODERNA BLUE border, Primary or Immunocompromised, (age 12y+), IM, 100 mcg/0.5mL   02/17/2021       Last COVID Lab No results found for: \"SARS-COV-2\", \"SARS-COV-2 RNA\", \"SARS-COV-2 RNA, RT PCR\", \"SARS-COV-2, CADEN\", \"SARS-COV-2, NAAT\", \"SARS-COV-2 BY PCR\", \"POC\", \"SARS-COV-2, POC\", \"RAPID\", \"SARS-COV-2, RAPID\", \"SALIVA\", \"SARS-COV-2, SALIVA\"         Wt Readings from Last 3 Encounters:

## 2024-04-10 RX ORDER — LANOLIN ALCOHOL/MO/W.PET/CERES
CREAM (GRAM) TOPICAL
Qty: 90 TABLET | Refills: 0 | Status: SHIPPED | OUTPATIENT
Start: 2024-04-10

## 2024-04-10 NOTE — TELEPHONE ENCOUNTER
Refill Request     CONFIRM preferrred pharmacy with the patient.    If Mail Order Rx - Pend for 90 day refill.      Last Seen: Last Seen Department: 4/9/2024  Last Seen by PCP: 4/9/2024    Last Written: 4/1/24    If no future appointment scheduled, route STAFF MESSAGE with patient name to the  Pool for scheduling.      Next Appointment:   No future appointments.    Message sent to  to schedule appt with patient?  N/A      Requested Prescriptions     Pending Prescriptions Disp Refills    magnesium oxide (MAG-OX) 400 (240 Mg) MG tablet [Pharmacy Med Name: MAGNESIUM OXIDE 400 MG TABLET] 90 tablet      Sig: TAKE 1 TABLET BY MOUTH EVERY DAY

## 2024-04-30 DIAGNOSIS — F51.04 PSYCHOPHYSIOLOGICAL INSOMNIA: ICD-10-CM

## 2024-05-01 RX ORDER — TRAZODONE HYDROCHLORIDE 50 MG/1
TABLET ORAL
Qty: 45 TABLET | Refills: 0 | Status: SHIPPED | OUTPATIENT
Start: 2024-05-01

## 2024-05-01 RX ORDER — ZOLPIDEM TARTRATE 5 MG/1
TABLET ORAL
Qty: 30 TABLET | Refills: 2 | Status: SHIPPED | OUTPATIENT
Start: 2024-05-01 | End: 2024-08-01

## 2024-05-03 RX ORDER — LANOLIN ALCOHOL/MO/W.PET/CERES
CREAM (GRAM) TOPICAL
Qty: 90 TABLET | Refills: 0 | Status: SHIPPED | OUTPATIENT
Start: 2024-05-03

## 2024-05-03 NOTE — TELEPHONE ENCOUNTER
Refill Request     CONFIRM preferrred pharmacy with the patient.    If Mail Order Rx - Pend for 90 day refill.      Last Seen: Last Seen Department: 4/9/2024  Last Seen by PCP: 4/9/2024    Last Written: 4-    If no future appointment scheduled, route STAFF MESSAGE with patient name to the  Pool for scheduling.      Next Appointment:   No future appointments.    Message sent to  to schedule appt with patient?  N/A      Requested Prescriptions     Pending Prescriptions Disp Refills    magnesium oxide (MAG-OX) 400 (240 Mg) MG tablet [Pharmacy Med Name: MAGNESIUM OXIDE 400 MG TABLET] 90 tablet 0     Sig: TAKE 1 TABLET BY MOUTH EVERY DAY

## 2024-05-14 RX ORDER — APIXABAN 5 MG/1
TABLET, FILM COATED ORAL
Qty: 60 TABLET | Refills: 2 | Status: SHIPPED | OUTPATIENT
Start: 2024-05-14

## 2024-05-17 RX ORDER — LANOLIN ALCOHOL/MO/W.PET/CERES
CREAM (GRAM) TOPICAL
Qty: 90 TABLET | Refills: 0 | OUTPATIENT
Start: 2024-05-17

## 2024-05-24 DIAGNOSIS — I10 ESSENTIAL HYPERTENSION: ICD-10-CM

## 2024-05-24 RX ORDER — LOSARTAN POTASSIUM 50 MG/1
TABLET ORAL
Qty: 180 TABLET | Refills: 0 | Status: SHIPPED | OUTPATIENT
Start: 2024-05-24

## 2024-05-24 NOTE — TELEPHONE ENCOUNTER
Future appt scheduled 0 appt scheduled                 Last appt 04/09/2024      Last Written 04/01/2024    losartan (COZAAR) 50 MG tablet   #180  0 RF

## 2024-06-08 ENCOUNTER — APPOINTMENT (OUTPATIENT)
Dept: GENERAL RADIOLOGY | Age: 89
DRG: 388 | End: 2024-06-08
Payer: MEDICARE

## 2024-06-08 ENCOUNTER — HOSPITAL ENCOUNTER (INPATIENT)
Age: 89
LOS: 4 days | Discharge: HOME HEALTH | DRG: 388 | End: 2024-06-12
Attending: EMERGENCY MEDICINE | Admitting: INTERNAL MEDICINE
Payer: MEDICARE

## 2024-06-08 ENCOUNTER — APPOINTMENT (OUTPATIENT)
Dept: CT IMAGING | Age: 89
DRG: 388 | End: 2024-06-08
Payer: MEDICARE

## 2024-06-08 DIAGNOSIS — I24.9 ACUTE CORONARY SYNDROME (HCC): ICD-10-CM

## 2024-06-08 DIAGNOSIS — R06.89 DYSPNEA AND RESPIRATORY ABNORMALITIES: Primary | ICD-10-CM

## 2024-06-08 DIAGNOSIS — R06.00 DYSPNEA AND RESPIRATORY ABNORMALITIES: Primary | ICD-10-CM

## 2024-06-08 DIAGNOSIS — K56.609 SBO (SMALL BOWEL OBSTRUCTION) (HCC): ICD-10-CM

## 2024-06-08 DIAGNOSIS — I50.9 ACUTE ON CHRONIC CONGESTIVE HEART FAILURE, UNSPECIFIED HEART FAILURE TYPE (HCC): ICD-10-CM

## 2024-06-08 DIAGNOSIS — R11.0 NAUSEA: ICD-10-CM

## 2024-06-08 DIAGNOSIS — K44.9 HIATAL HERNIA: ICD-10-CM

## 2024-06-08 DIAGNOSIS — I21.4 NSTEMI (NON-ST ELEVATED MYOCARDIAL INFARCTION) (HCC): ICD-10-CM

## 2024-06-08 LAB
ALBUMIN SERPL-MCNC: 3.8 G/DL (ref 3.4–5)
ALBUMIN/GLOB SERPL: 1.3 {RATIO} (ref 1.1–2.2)
ALP SERPL-CCNC: 96 U/L (ref 40–129)
ALT SERPL-CCNC: 19 U/L (ref 10–40)
ANION GAP SERPL CALCULATED.3IONS-SCNC: 11 MMOL/L (ref 3–16)
ANTI-XA UNFRAC HEPARIN: >1.1 IU/ML (ref 0.3–0.7)
APTT BLD: 30.3 SEC (ref 22.1–36.4)
AST SERPL-CCNC: 35 U/L (ref 15–37)
BACTERIA URNS QL MICRO: ABNORMAL /HPF
BASE EXCESS BLDV CALC-SCNC: 4.8 MMOL/L (ref -3–3)
BASOPHILS # BLD: 0 K/UL (ref 0–0.2)
BASOPHILS NFR BLD: 0.5 %
BILIRUB SERPL-MCNC: 0.5 MG/DL (ref 0–1)
BILIRUB UR QL STRIP.AUTO: NEGATIVE
BUN SERPL-MCNC: 12 MG/DL (ref 7–20)
CALCIUM SERPL-MCNC: 9.7 MG/DL (ref 8.3–10.6)
CHLORIDE SERPL-SCNC: 93 MMOL/L (ref 99–110)
CLARITY UR: CLEAR
CO2 BLDV-SCNC: 31 MMOL/L
CO2 SERPL-SCNC: 29 MMOL/L (ref 21–32)
COHGB MFR BLDV: 2.7 % (ref 0–1.5)
COLOR UR: YELLOW
CREAT SERPL-MCNC: <0.5 MG/DL (ref 0.6–1.2)
DEPRECATED RDW RBC AUTO: 14.7 % (ref 12.4–15.4)
DEPRECATED RDW RBC AUTO: 14.9 % (ref 12.4–15.4)
EKG ATRIAL RATE: 89 BPM
EKG DIAGNOSIS: NORMAL
EKG P AXIS: 75 DEGREES
EKG P-R INTERVAL: 206 MS
EKG Q-T INTERVAL: 376 MS
EKG QRS DURATION: 144 MS
EKG QTC CALCULATION (BAZETT): 457 MS
EKG R AXIS: -56 DEGREES
EKG T AXIS: 116 DEGREES
EKG VENTRICULAR RATE: 89 BPM
EOSINOPHIL # BLD: 0 K/UL (ref 0–0.6)
EOSINOPHIL NFR BLD: 0.1 %
GFR SERPLBLD CREATININE-BSD FMLA CKD-EPI: 85 ML/MIN/{1.73_M2}
GLUCOSE SERPL-MCNC: 139 MG/DL (ref 70–99)
GLUCOSE UR STRIP.AUTO-MCNC: NEGATIVE MG/DL
HCO3 BLDV-SCNC: 29.5 MMOL/L (ref 23–29)
HCT VFR BLD AUTO: 42 % (ref 36–48)
HCT VFR BLD AUTO: 42.4 % (ref 36–48)
HGB BLD-MCNC: 13.5 G/DL (ref 12–16)
HGB BLD-MCNC: 14.4 G/DL (ref 12–16)
HGB UR QL STRIP.AUTO: NEGATIVE
INR PPP: 1.23 (ref 0.85–1.15)
KETONES UR STRIP.AUTO-MCNC: NEGATIVE MG/DL
LEFT VENTRICULAR EJECTION FRACTION MODE: NORMAL
LEUKOCYTE ESTERASE UR QL STRIP.AUTO: ABNORMAL
LIPASE SERPL-CCNC: 24 U/L (ref 13–60)
LV EF: 55 % (ref 55–60)
LYMPHOCYTES # BLD: 0.5 K/UL (ref 1–5.1)
LYMPHOCYTES NFR BLD: 5.5 %
MCH RBC QN AUTO: 31.4 PG (ref 26–34)
MCH RBC QN AUTO: 32.4 PG (ref 26–34)
MCHC RBC AUTO-ENTMCNC: 32.3 G/DL (ref 31–36)
MCHC RBC AUTO-ENTMCNC: 33.9 G/DL (ref 31–36)
MCV RBC AUTO: 95.7 FL (ref 80–100)
MCV RBC AUTO: 97.2 FL (ref 80–100)
METHGB MFR BLDV: 0.3 %
MONOCYTES # BLD: 0.4 K/UL (ref 0–1.3)
MONOCYTES NFR BLD: 3.7 %
NEUTROPHILS # BLD: 9 K/UL (ref 1.7–7.7)
NEUTROPHILS NFR BLD: 90.2 %
NITRITE UR QL STRIP.AUTO: POSITIVE
NT-PROBNP SERPL-MCNC: 2366 PG/ML (ref 0–449)
O2 THERAPY: ABNORMAL
PCO2 BLDV: 43.6 MMHG (ref 40–50)
PH BLDV: 7.45 [PH] (ref 7.35–7.45)
PH UR STRIP.AUTO: 7 [PH] (ref 5–8)
PLATELET # BLD AUTO: 180 K/UL (ref 135–450)
PLATELET # BLD AUTO: 207 K/UL (ref 135–450)
PMV BLD AUTO: 8.1 FL (ref 5–10.5)
PO2 BLDV: 61.7 MMHG (ref 25–40)
POTASSIUM SERPL-SCNC: 4.6 MMOL/L (ref 3.5–5.1)
PROT SERPL-MCNC: 6.8 G/DL (ref 6.4–8.2)
PROT UR STRIP.AUTO-MCNC: ABNORMAL MG/DL
PROTHROMBIN TIME: 15.6 SEC (ref 11.9–14.9)
RBC # BLD AUTO: 4.32 M/UL (ref 4–5.2)
RBC # BLD AUTO: 4.44 M/UL (ref 4–5.2)
RBC #/AREA URNS HPF: ABNORMAL /HPF (ref 0–4)
SAO2 % BLDV: 91 %
SODIUM SERPL-SCNC: 133 MMOL/L (ref 136–145)
SP GR UR STRIP.AUTO: 1.02 (ref 1–1.03)
TROPONIN, HIGH SENSITIVITY: 348 NG/L (ref 0–14)
TROPONIN, HIGH SENSITIVITY: 360 NG/L (ref 0–14)
UA DIPSTICK W REFLEX MICRO PNL UR: YES
URN SPEC COLLECT METH UR: ABNORMAL
UROBILINOGEN UR STRIP-ACNC: 0.2 E.U./DL
WBC # BLD AUTO: 10 K/UL (ref 4–11)
WBC # BLD AUTO: 11.6 K/UL (ref 4–11)
WBC #/AREA URNS HPF: ABNORMAL /HPF (ref 0–5)

## 2024-06-08 PROCEDURE — 99285 EMERGENCY DEPT VISIT HI MDM: CPT

## 2024-06-08 PROCEDURE — 99223 1ST HOSP IP/OBS HIGH 75: CPT | Performed by: STUDENT IN AN ORGANIZED HEALTH CARE EDUCATION/TRAINING PROGRAM

## 2024-06-08 PROCEDURE — 85610 PROTHROMBIN TIME: CPT

## 2024-06-08 PROCEDURE — 74177 CT ABD & PELVIS W/CONTRAST: CPT

## 2024-06-08 PROCEDURE — 81001 URINALYSIS AUTO W/SCOPE: CPT

## 2024-06-08 PROCEDURE — 6360000002 HC RX W HCPCS: Performed by: INTERNAL MEDICINE

## 2024-06-08 PROCEDURE — 96375 TX/PRO/DX INJ NEW DRUG ADDON: CPT

## 2024-06-08 PROCEDURE — 2500000003 HC RX 250 WO HCPCS: Performed by: INTERNAL MEDICINE

## 2024-06-08 PROCEDURE — 82803 BLOOD GASES ANY COMBINATION: CPT

## 2024-06-08 PROCEDURE — 85027 COMPLETE CBC AUTOMATED: CPT

## 2024-06-08 PROCEDURE — 71260 CT THORAX DX C+: CPT

## 2024-06-08 PROCEDURE — 83690 ASSAY OF LIPASE: CPT

## 2024-06-08 PROCEDURE — 96372 THER/PROPH/DIAG INJ SC/IM: CPT

## 2024-06-08 PROCEDURE — 87040 BLOOD CULTURE FOR BACTERIA: CPT

## 2024-06-08 PROCEDURE — 2060000000 HC ICU INTERMEDIATE R&B

## 2024-06-08 PROCEDURE — 93005 ELECTROCARDIOGRAM TRACING: CPT | Performed by: PHYSICIAN ASSISTANT

## 2024-06-08 PROCEDURE — 71045 X-RAY EXAM CHEST 1 VIEW: CPT

## 2024-06-08 PROCEDURE — 93010 ELECTROCARDIOGRAM REPORT: CPT | Performed by: INTERNAL MEDICINE

## 2024-06-08 PROCEDURE — 85730 THROMBOPLASTIN TIME PARTIAL: CPT

## 2024-06-08 PROCEDURE — 85025 COMPLETE CBC W/AUTO DIFF WBC: CPT

## 2024-06-08 PROCEDURE — 2580000003 HC RX 258: Performed by: INTERNAL MEDICINE

## 2024-06-08 PROCEDURE — 2500000003 HC RX 250 WO HCPCS: Performed by: STUDENT IN AN ORGANIZED HEALTH CARE EDUCATION/TRAINING PROGRAM

## 2024-06-08 PROCEDURE — C9113 INJ PANTOPRAZOLE SODIUM, VIA: HCPCS | Performed by: INTERNAL MEDICINE

## 2024-06-08 PROCEDURE — 36415 COLL VENOUS BLD VENIPUNCTURE: CPT

## 2024-06-08 PROCEDURE — 80053 COMPREHEN METABOLIC PANEL: CPT

## 2024-06-08 PROCEDURE — 84484 ASSAY OF TROPONIN QUANT: CPT

## 2024-06-08 PROCEDURE — 6360000002 HC RX W HCPCS: Performed by: PHYSICIAN ASSISTANT

## 2024-06-08 PROCEDURE — 2580000003 HC RX 258: Performed by: STUDENT IN AN ORGANIZED HEALTH CARE EDUCATION/TRAINING PROGRAM

## 2024-06-08 PROCEDURE — 0D9670Z DRAINAGE OF STOMACH WITH DRAINAGE DEVICE, VIA NATURAL OR ARTIFICIAL OPENING: ICD-10-PCS | Performed by: SURGERY

## 2024-06-08 PROCEDURE — 2700000000 HC OXYGEN THERAPY PER DAY

## 2024-06-08 PROCEDURE — 74018 RADEX ABDOMEN 1 VIEW: CPT

## 2024-06-08 PROCEDURE — 94669 MECHANICAL CHEST WALL OSCILL: CPT

## 2024-06-08 PROCEDURE — 85520 HEPARIN ASSAY: CPT

## 2024-06-08 PROCEDURE — 83880 ASSAY OF NATRIURETIC PEPTIDE: CPT

## 2024-06-08 PROCEDURE — 94761 N-INVAS EAR/PLS OXIMETRY MLT: CPT

## 2024-06-08 PROCEDURE — 6370000000 HC RX 637 (ALT 250 FOR IP): Performed by: PHYSICIAN ASSISTANT

## 2024-06-08 PROCEDURE — 96374 THER/PROPH/DIAG INJ IV PUSH: CPT

## 2024-06-08 PROCEDURE — 87449 NOS EACH ORGANISM AG IA: CPT

## 2024-06-08 PROCEDURE — 6360000004 HC RX CONTRAST MEDICATION: Performed by: PHYSICIAN ASSISTANT

## 2024-06-08 RX ORDER — BISACODYL 5 MG/1
5 TABLET, DELAYED RELEASE ORAL DAILY PRN
Status: DISCONTINUED | OUTPATIENT
Start: 2024-06-08 | End: 2024-06-12 | Stop reason: HOSPADM

## 2024-06-08 RX ORDER — SODIUM CHLORIDE 0.9 % (FLUSH) 0.9 %
5-40 SYRINGE (ML) INJECTION EVERY 12 HOURS SCHEDULED
Status: DISCONTINUED | OUTPATIENT
Start: 2024-06-08 | End: 2024-06-12 | Stop reason: HOSPADM

## 2024-06-08 RX ORDER — ASPIRIN 81 MG/1
324 TABLET, CHEWABLE ORAL ONCE
Status: COMPLETED | OUTPATIENT
Start: 2024-06-08 | End: 2024-06-08

## 2024-06-08 RX ORDER — ONDANSETRON 2 MG/ML
4 INJECTION INTRAMUSCULAR; INTRAVENOUS EVERY 6 HOURS PRN
Status: DISCONTINUED | OUTPATIENT
Start: 2024-06-08 | End: 2024-06-12 | Stop reason: HOSPADM

## 2024-06-08 RX ORDER — IPRATROPIUM BROMIDE AND ALBUTEROL SULFATE 2.5; .5 MG/3ML; MG/3ML
1 SOLUTION RESPIRATORY (INHALATION) EVERY 4 HOURS PRN
Status: DISCONTINUED | OUTPATIENT
Start: 2024-06-08 | End: 2024-06-12 | Stop reason: HOSPADM

## 2024-06-08 RX ORDER — SODIUM CHLORIDE 9 MG/ML
INJECTION, SOLUTION INTRAVENOUS PRN
Status: DISCONTINUED | OUTPATIENT
Start: 2024-06-08 | End: 2024-06-12 | Stop reason: HOSPADM

## 2024-06-08 RX ORDER — POTASSIUM CHLORIDE 20 MEQ/1
40 TABLET, EXTENDED RELEASE ORAL PRN
Status: DISCONTINUED | OUTPATIENT
Start: 2024-06-08 | End: 2024-06-12 | Stop reason: HOSPADM

## 2024-06-08 RX ORDER — DICYCLOMINE HYDROCHLORIDE 10 MG/ML
20 INJECTION INTRAMUSCULAR ONCE
Status: COMPLETED | OUTPATIENT
Start: 2024-06-08 | End: 2024-06-08

## 2024-06-08 RX ORDER — FUROSEMIDE 10 MG/ML
20 INJECTION INTRAMUSCULAR; INTRAVENOUS ONCE
Status: COMPLETED | OUTPATIENT
Start: 2024-06-08 | End: 2024-06-08

## 2024-06-08 RX ORDER — SODIUM CHLORIDE 0.9 % (FLUSH) 0.9 %
5-40 SYRINGE (ML) INJECTION PRN
Status: DISCONTINUED | OUTPATIENT
Start: 2024-06-08 | End: 2024-06-12 | Stop reason: HOSPADM

## 2024-06-08 RX ORDER — HEPARIN SODIUM 10000 [USP'U]/100ML
740 INJECTION, SOLUTION INTRAVENOUS CONTINUOUS
Status: DISCONTINUED | OUTPATIENT
Start: 2024-06-08 | End: 2024-06-09

## 2024-06-08 RX ORDER — ACETAMINOPHEN 650 MG/1
650 SUPPOSITORY RECTAL EVERY 6 HOURS PRN
Status: DISCONTINUED | OUTPATIENT
Start: 2024-06-08 | End: 2024-06-12 | Stop reason: HOSPADM

## 2024-06-08 RX ORDER — ONDANSETRON 4 MG/1
4 TABLET, ORALLY DISINTEGRATING ORAL EVERY 8 HOURS PRN
Status: DISCONTINUED | OUTPATIENT
Start: 2024-06-08 | End: 2024-06-12 | Stop reason: HOSPADM

## 2024-06-08 RX ORDER — PANTOPRAZOLE SODIUM 40 MG/10ML
40 INJECTION, POWDER, LYOPHILIZED, FOR SOLUTION INTRAVENOUS DAILY
Status: DISCONTINUED | OUTPATIENT
Start: 2024-06-08 | End: 2024-06-11

## 2024-06-08 RX ORDER — MAGNESIUM HYDROXIDE/ALUMINUM HYDROXICE/SIMETHICONE 120; 1200; 1200 MG/30ML; MG/30ML; MG/30ML
30 SUSPENSION ORAL EVERY 6 HOURS PRN
Status: DISCONTINUED | OUTPATIENT
Start: 2024-06-08 | End: 2024-06-12 | Stop reason: HOSPADM

## 2024-06-08 RX ORDER — METOPROLOL TARTRATE 1 MG/ML
5 INJECTION, SOLUTION INTRAVENOUS EVERY 8 HOURS
Status: DISCONTINUED | OUTPATIENT
Start: 2024-06-08 | End: 2024-06-11

## 2024-06-08 RX ORDER — HEPARIN SODIUM 1000 [USP'U]/ML
3700 INJECTION, SOLUTION INTRAVENOUS; SUBCUTANEOUS PRN
Status: DISCONTINUED | OUTPATIENT
Start: 2024-06-08 | End: 2024-06-12

## 2024-06-08 RX ORDER — ONDANSETRON 2 MG/ML
4 INJECTION INTRAMUSCULAR; INTRAVENOUS ONCE
Status: COMPLETED | OUTPATIENT
Start: 2024-06-08 | End: 2024-06-08

## 2024-06-08 RX ORDER — IPRATROPIUM BROMIDE AND ALBUTEROL SULFATE 2.5; .5 MG/3ML; MG/3ML
1 SOLUTION RESPIRATORY (INHALATION)
Status: DISCONTINUED | OUTPATIENT
Start: 2024-06-08 | End: 2024-06-08

## 2024-06-08 RX ORDER — CARVEDILOL 3.12 MG/1
3.12 TABLET ORAL 2 TIMES DAILY WITH MEALS
COMMUNITY
Start: 2024-05-24

## 2024-06-08 RX ORDER — HEPARIN SODIUM 1000 [USP'U]/ML
1900 INJECTION, SOLUTION INTRAVENOUS; SUBCUTANEOUS PRN
Status: DISCONTINUED | OUTPATIENT
Start: 2024-06-08 | End: 2024-06-12

## 2024-06-08 RX ORDER — LEVOTHYROXINE SODIUM ANHYDROUS 100 UG/5ML
100 INJECTION, POWDER, LYOPHILIZED, FOR SOLUTION INTRAVENOUS DAILY
Status: DISCONTINUED | OUTPATIENT
Start: 2024-06-09 | End: 2024-06-11

## 2024-06-08 RX ORDER — ACETAMINOPHEN 325 MG/1
650 TABLET ORAL EVERY 6 HOURS PRN
Status: DISCONTINUED | OUTPATIENT
Start: 2024-06-08 | End: 2024-06-12 | Stop reason: HOSPADM

## 2024-06-08 RX ORDER — HEPARIN SODIUM 1000 [USP'U]/ML
3700 INJECTION, SOLUTION INTRAVENOUS; SUBCUTANEOUS ONCE
Status: COMPLETED | OUTPATIENT
Start: 2024-06-08 | End: 2024-06-08

## 2024-06-08 RX ORDER — POTASSIUM CHLORIDE 7.45 MG/ML
10 INJECTION INTRAVENOUS PRN
Status: DISCONTINUED | OUTPATIENT
Start: 2024-06-08 | End: 2024-06-12 | Stop reason: HOSPADM

## 2024-06-08 RX ADMIN — DOXYCYCLINE 100 MG: 100 INJECTION, POWDER, LYOPHILIZED, FOR SOLUTION INTRAVENOUS at 20:02

## 2024-06-08 RX ADMIN — PANTOPRAZOLE SODIUM 40 MG: 40 INJECTION, POWDER, FOR SOLUTION INTRAVENOUS at 19:46

## 2024-06-08 RX ADMIN — IOPAMIDOL 75 ML: 755 INJECTION, SOLUTION INTRAVENOUS at 14:18

## 2024-06-08 RX ADMIN — HEPARIN SODIUM AND DEXTROSE 740 UNITS/HR: 10000; 5 INJECTION INTRAVENOUS at 21:12

## 2024-06-08 RX ADMIN — ASPIRIN 81 MG 324 MG: 81 TABLET ORAL at 13:28

## 2024-06-08 RX ADMIN — HEPARIN SODIUM 3700 UNITS: 1000 INJECTION INTRAVENOUS; SUBCUTANEOUS at 21:12

## 2024-06-08 RX ADMIN — METOPROLOL TARTRATE 5 MG: 5 INJECTION INTRAVENOUS at 19:51

## 2024-06-08 RX ADMIN — FUROSEMIDE 20 MG: 10 INJECTION, SOLUTION INTRAMUSCULAR; INTRAVENOUS at 13:24

## 2024-06-08 RX ADMIN — DICYCLOMINE HYDROCHLORIDE 20 MG: 10 INJECTION, SOLUTION INTRAMUSCULAR at 13:31

## 2024-06-08 RX ADMIN — ONDANSETRON 4 MG: 2 INJECTION INTRAMUSCULAR; INTRAVENOUS at 13:14

## 2024-06-08 RX ADMIN — SODIUM CHLORIDE, PRESERVATIVE FREE 10 ML: 5 INJECTION INTRAVENOUS at 19:47

## 2024-06-08 ASSESSMENT — PAIN DESCRIPTION - LOCATION: LOCATION: ABDOMEN

## 2024-06-08 ASSESSMENT — PAIN - FUNCTIONAL ASSESSMENT: PAIN_FUNCTIONAL_ASSESSMENT: NONE - DENIES PAIN

## 2024-06-08 NOTE — ED PROVIDER NOTES
[facility] A2 CARD TELEMETRY  Emergency Department Encounter    Patient Name: Vj Gold  MRN: [id number]  YOB: 1927  Date of Evaluation: 6/8/2024  Provider: Kimo Conklin MD  Note Started: 11:38 AM EDT 6/8/24    CHIEF COMPLAINT  Shortness of Breath (From home.  Pt to ED via EMS with CC of SOB and some emesis starting yesterday. EMS administered 4mg ondansetron in route with relief.)    SHARED SERVICE VISIT  I have seen and evaluated this patient with my supervising physician, Dr. Alonso.     HISTORY OF PRESENT ILLNESS  Vj Gold is a 89 y.o. female who presents to the ED for evaluation of cough, shortness of breath with nausea and vomiting.  Patient brought in by squad today for evaluation.  Patient states that she has felt somewhat unwell today.  States that she has had some rumbling in her abdomen with nausea and vomiting.  No diarrhea or constipation.  No black or bloody stools.  Denies chest pain.  Mild cough.  Nonproductive.  No hemoptysis.  Denies any fevers chills.  Has noted no urinary symptoms.  Denies leg pain or swelling..    No other complaints, modifying factors or associated symptoms.     Nursing notes reviewed were all reviewed and agreed with or any disagreements were addressed in the HPI.    PMH:  Past Medical History:   Diagnosis Date    Arthritis     Arthritis     possibly Rheumatoid, Dr. Altman    CAD (coronary artery disease)     GERD (gastroesophageal reflux disease)     Hyperlipidemia     Hypertension     Thyroid disease      Surgical History:  Past Surgical History:   Procedure Laterality Date    CHOLECYSTECTOMY      CORONARY ANGIOPLASTY WITH STENT PLACEMENT      JOINT REPLACEMENT      \"knees and hips\"    NE ESOPHAGOGASTRODUODENOSCOPY TRANSORAL DIAGNOSTIC N/A 11/7/2018    EGD ESOPHAGOGASTRODUODENOSCOPY performed by Cezar Tovar MD at [facility] ENDOSCOPY    UPPER GASTROINTESTINAL ENDOSCOPY N/A 7/30/2022    EGD BIOPSY performed by Robel Paredes DO at [facility] 
patient and independently provided 35 minutes of non-concurrent critical care out of the total shared critical care time excluding separately billable procedures.    This chart was generated in part by using Dragon Dictation system and may contain errors related to that system including errors in grammar, punctuation, and spelling, as well as words and phrases that may be inappropriate. If there are any questions or concerns please feel free to contact the dictating provider for clarification.     Mauricio Alonso II, DO   [employer]       Mauricio Alonso II,   06/09/24 152

## 2024-06-08 NOTE — ED NOTES
ED GENERAL SURGERY CONSULT  RE-sbo  [id number]-Paged  through PS  [id number]- returned page, transferred to Keaton GARDNER

## 2024-06-08 NOTE — H&P
Nonemergent right upper quadrant ultrasound recommended following resolution of patient's acute illness. 5.  Trace bilateral pleural fluid with small left lower lobe consolidation, possibly pneumonia in the appropriate clinical setting.  Imaging follow-up recommended to document resolution. 6.  Focal enlargement of the lower right psoas muscle near the musculotendinous junction.  This may reflect chronic hematoma or bursitis though is incompletely characterized.  Nonemergent MR follow-up may be helpful as clinically warranted. 7.  Additional findings, as above.     CT CHEST PULMONARY EMBOLISM W CONTRAST    Result Date: 6/8/2024  EXAMINATION: CTA OF THE CHEST 6/8/2024 2:06 pm TECHNIQUE: CTA of the chest was performed after the administration of intravenous contrast.  Multiplanar reformatted images are provided for review.  MIP images are provided for review. Automated exposure control, iterative reconstruction, and/or weight based adjustment of the mA/kV was utilized to reduce the radiation dose to as low as reasonably achievable. COMPARISON: None. HISTORY: Hypoxia, short of breath Reason for Exam: Chest pain/abd pain since last night with h/o DVT FINDINGS: Pulmonary Arteries: Pulmonary arteries are adequately opacified for evaluation.  No evidence of intraluminal filling defect to suggest pulmonary embolism.  Main pulmonary artery is normal in caliber, 22 mm. Mediastinum: No evidence of mediastinal lymphadenopathy.  Moderate calcific atherosclerosis coronary arteries.  The heart is enlarged.  There is no acute abnormality of the thoracic aorta.  The ascending thoracic aorta 34 mm, descending thoracic aorta 25 mm.  Very large hiatal hernia. Lungs/pleura: No pneumothorax.  Trace pleural effusion bilateral with minimal bibasilar consolidation.  No inspissated secretions or endobronchial lesion evident. Upper Abdomen: Correlate with findings from CTA and/pelvis performed at the same time. Soft Tissues/Bones: No acute

## 2024-06-09 PROBLEM — R06.89 DYSPNEA AND RESPIRATORY ABNORMALITIES: Status: ACTIVE | Noted: 2024-06-09

## 2024-06-09 PROBLEM — R06.00 DYSPNEA AND RESPIRATORY ABNORMALITIES: Status: ACTIVE | Noted: 2024-06-09

## 2024-06-09 LAB
ALBUMIN SERPL-MCNC: 3 G/DL (ref 3.4–5)
ALBUMIN/GLOB SERPL: 1.4 {RATIO} (ref 1.1–2.2)
ALP SERPL-CCNC: 71 U/L (ref 40–129)
ALT SERPL-CCNC: 15 U/L (ref 10–40)
ANION GAP SERPL CALCULATED.3IONS-SCNC: 10 MMOL/L (ref 3–16)
APTT BLD: 169.8 SEC (ref 22.1–36.4)
APTT BLD: 57.5 SEC (ref 22.1–36.4)
APTT BLD: >180 SEC (ref 22.1–36.4)
AST SERPL-CCNC: 32 U/L (ref 15–37)
BASOPHILS # BLD: 0 K/UL (ref 0–0.2)
BASOPHILS NFR BLD: 0.4 %
BILIRUB SERPL-MCNC: 0.5 MG/DL (ref 0–1)
BUN SERPL-MCNC: 18 MG/DL (ref 7–20)
CALCIUM SERPL-MCNC: 8.5 MG/DL (ref 8.3–10.6)
CHLORIDE SERPL-SCNC: 94 MMOL/L (ref 99–110)
CO2 SERPL-SCNC: 29 MMOL/L (ref 21–32)
CREAT SERPL-MCNC: <0.5 MG/DL (ref 0.6–1.2)
DEPRECATED RDW RBC AUTO: 14.7 % (ref 12.4–15.4)
EOSINOPHIL # BLD: 0 K/UL (ref 0–0.6)
EOSINOPHIL NFR BLD: 0.4 %
GFR SERPLBLD CREATININE-BSD FMLA CKD-EPI: 85 ML/MIN/{1.73_M2}
GLUCOSE BLD-MCNC: 107 MG/DL (ref 70–99)
GLUCOSE BLD-MCNC: 109 MG/DL (ref 70–99)
GLUCOSE BLD-MCNC: 90 MG/DL (ref 70–99)
GLUCOSE SERPL-MCNC: 102 MG/DL (ref 70–99)
HCT VFR BLD AUTO: 37.3 % (ref 36–48)
HGB BLD-MCNC: 12.5 G/DL (ref 12–16)
LYMPHOCYTES # BLD: 1.1 K/UL (ref 1–5.1)
LYMPHOCYTES NFR BLD: 12.3 %
MAGNESIUM SERPL-MCNC: 1.6 MG/DL (ref 1.8–2.4)
MCH RBC QN AUTO: 32.1 PG (ref 26–34)
MCHC RBC AUTO-ENTMCNC: 33.4 G/DL (ref 31–36)
MCV RBC AUTO: 96.1 FL (ref 80–100)
MONOCYTES # BLD: 0.8 K/UL (ref 0–1.3)
MONOCYTES NFR BLD: 9.4 %
NEUTROPHILS # BLD: 6.7 K/UL (ref 1.7–7.7)
NEUTROPHILS NFR BLD: 77.5 %
PERFORMED ON: ABNORMAL
PERFORMED ON: ABNORMAL
PERFORMED ON: NORMAL
PLATELET # BLD AUTO: 170 K/UL (ref 135–450)
PMV BLD AUTO: 8.2 FL (ref 5–10.5)
POTASSIUM SERPL-SCNC: 3.5 MMOL/L (ref 3.5–5.1)
PROT SERPL-MCNC: 5.1 G/DL (ref 6.4–8.2)
RBC # BLD AUTO: 3.88 M/UL (ref 4–5.2)
SODIUM SERPL-SCNC: 133 MMOL/L (ref 136–145)
TROPONIN, HIGH SENSITIVITY: 337 NG/L (ref 0–14)
WBC # BLD AUTO: 8.7 K/UL (ref 4–11)

## 2024-06-09 PROCEDURE — 2580000003 HC RX 258: Performed by: STUDENT IN AN ORGANIZED HEALTH CARE EDUCATION/TRAINING PROGRAM

## 2024-06-09 PROCEDURE — 2580000003 HC RX 258: Performed by: INTERNAL MEDICINE

## 2024-06-09 PROCEDURE — 85025 COMPLETE CBC W/AUTO DIFF WBC: CPT

## 2024-06-09 PROCEDURE — 6360000002 HC RX W HCPCS: Performed by: INTERNAL MEDICINE

## 2024-06-09 PROCEDURE — 97162 PT EVAL MOD COMPLEX 30 MIN: CPT

## 2024-06-09 PROCEDURE — 97530 THERAPEUTIC ACTIVITIES: CPT

## 2024-06-09 PROCEDURE — 99223 1ST HOSP IP/OBS HIGH 75: CPT | Performed by: INTERNAL MEDICINE

## 2024-06-09 PROCEDURE — 6360000002 HC RX W HCPCS

## 2024-06-09 PROCEDURE — 83735 ASSAY OF MAGNESIUM: CPT

## 2024-06-09 PROCEDURE — 36415 COLL VENOUS BLD VENIPUNCTURE: CPT

## 2024-06-09 PROCEDURE — 99232 SBSQ HOSP IP/OBS MODERATE 35: CPT | Performed by: STUDENT IN AN ORGANIZED HEALTH CARE EDUCATION/TRAINING PROGRAM

## 2024-06-09 PROCEDURE — 94761 N-INVAS EAR/PLS OXIMETRY MLT: CPT

## 2024-06-09 PROCEDURE — 2700000000 HC OXYGEN THERAPY PER DAY

## 2024-06-09 PROCEDURE — C9113 INJ PANTOPRAZOLE SODIUM, VIA: HCPCS | Performed by: INTERNAL MEDICINE

## 2024-06-09 PROCEDURE — 85730 THROMBOPLASTIN TIME PARTIAL: CPT

## 2024-06-09 PROCEDURE — 97166 OT EVAL MOD COMPLEX 45 MIN: CPT

## 2024-06-09 PROCEDURE — 2060000000 HC ICU INTERMEDIATE R&B

## 2024-06-09 PROCEDURE — 99222 1ST HOSP IP/OBS MODERATE 55: CPT | Performed by: SURGERY

## 2024-06-09 PROCEDURE — 2500000003 HC RX 250 WO HCPCS: Performed by: STUDENT IN AN ORGANIZED HEALTH CARE EDUCATION/TRAINING PROGRAM

## 2024-06-09 PROCEDURE — 97535 SELF CARE MNGMENT TRAINING: CPT

## 2024-06-09 PROCEDURE — 97116 GAIT TRAINING THERAPY: CPT

## 2024-06-09 PROCEDURE — 80053 COMPREHEN METABOLIC PANEL: CPT

## 2024-06-09 PROCEDURE — 2500000003 HC RX 250 WO HCPCS: Performed by: INTERNAL MEDICINE

## 2024-06-09 PROCEDURE — 84484 ASSAY OF TROPONIN QUANT: CPT

## 2024-06-09 RX ORDER — SODIUM CHLORIDE 9 MG/ML
INJECTION, SOLUTION INTRAVENOUS CONTINUOUS
Status: DISCONTINUED | OUTPATIENT
Start: 2024-06-09 | End: 2024-06-12

## 2024-06-09 RX ORDER — HEPARIN SODIUM 10000 [USP'U]/100ML
580 INJECTION, SOLUTION INTRAVENOUS CONTINUOUS
Status: DISCONTINUED | OUTPATIENT
Start: 2024-06-09 | End: 2024-06-12

## 2024-06-09 RX ORDER — MAGNESIUM SULFATE IN WATER 40 MG/ML
2000 INJECTION, SOLUTION INTRAVENOUS ONCE
Status: COMPLETED | OUTPATIENT
Start: 2024-06-09 | End: 2024-06-09

## 2024-06-09 RX ORDER — HEPARIN SODIUM 1000 [USP'U]/ML
3700 INJECTION, SOLUTION INTRAVENOUS; SUBCUTANEOUS ONCE
Status: COMPLETED | OUTPATIENT
Start: 2024-06-09 | End: 2024-06-09

## 2024-06-09 RX ORDER — HEPARIN SODIUM 10000 [USP'U]/100ML
800 INJECTION, SOLUTION INTRAVENOUS CONTINUOUS
Status: DISCONTINUED | OUTPATIENT
Start: 2024-06-09 | End: 2024-06-09

## 2024-06-09 RX ADMIN — SODIUM CHLORIDE: 9 INJECTION, SOLUTION INTRAVENOUS at 16:52

## 2024-06-09 RX ADMIN — LEVOTHYROXINE SODIUM ANHYDROUS 100 MCG: 100 INJECTION, POWDER, LYOPHILIZED, FOR SOLUTION INTRAVENOUS at 06:13

## 2024-06-09 RX ADMIN — PANTOPRAZOLE SODIUM 40 MG: 40 INJECTION, POWDER, FOR SOLUTION INTRAVENOUS at 10:20

## 2024-06-09 RX ADMIN — MAGNESIUM SULFATE HEPTAHYDRATE 2000 MG: 40 INJECTION, SOLUTION INTRAVENOUS at 10:51

## 2024-06-09 RX ADMIN — HEPARIN SODIUM 3700 UNITS: 1000 INJECTION INTRAVENOUS; SUBCUTANEOUS at 13:05

## 2024-06-09 RX ADMIN — SODIUM CHLORIDE, PRESERVATIVE FREE 10 ML: 5 INJECTION INTRAVENOUS at 10:22

## 2024-06-09 RX ADMIN — DOXYCYCLINE 100 MG: 100 INJECTION, POWDER, LYOPHILIZED, FOR SOLUTION INTRAVENOUS at 03:59

## 2024-06-09 RX ADMIN — METOPROLOL TARTRATE 5 MG: 5 INJECTION INTRAVENOUS at 10:21

## 2024-06-09 RX ADMIN — DOXYCYCLINE 100 MG: 100 INJECTION, POWDER, LYOPHILIZED, FOR SOLUTION INTRAVENOUS at 16:56

## 2024-06-09 RX ADMIN — SODIUM CHLORIDE, PRESERVATIVE FREE 10 ML: 5 INJECTION INTRAVENOUS at 21:12

## 2024-06-09 NOTE — CONSULTS
Department of General Surgery Consult    PATIENT NAME: Vj Gold   YOB: 1927    ADMISSION DATE: 6/8/2024 11:10 AM      TODAY'S DATE: 6/9/2024    Reason for Consult: Small bowel obstruction    Chief Complaint: Abdominal pain    Historian: Patient    Requesting Practitioner: Jerry    HISTORY OF PRESENT ILLNESS:              The patient is a 89 y.o. female who presents with abdominal pain with nausea and vomiting.  She states this started yesterday.  She had multiple episodes of emesis.  She states she feels much better today.  She denies any significant abdominal pain.    Past Medical History:        Diagnosis Date    Arthritis     Arthritis     possibly Rheumatoid, Dr. Altman    CAD (coronary artery disease)     GERD (gastroesophageal reflux disease)     Hyperlipidemia     Hypertension     Thyroid disease        Past Surgical History:        Procedure Laterality Date    CHOLECYSTECTOMY      CORONARY ANGIOPLASTY WITH STENT PLACEMENT      JOINT REPLACEMENT      \"knees and hips\"    DE ESOPHAGOGASTRODUODENOSCOPY TRANSORAL DIAGNOSTIC N/A 11/7/2018    EGD ESOPHAGOGASTRODUODENOSCOPY performed by Cezar Tovar MD at [facility] ENDOSCOPY    UPPER GASTROINTESTINAL ENDOSCOPY N/A 7/30/2022    EGD BIOPSY performed by Robel Paredes DO at [facility] ENDOSCOPY       Current Medications:   Current Facility-Administered Medications: heparin 25,000 units in dextrose 5% 250 mL (premix) infusion, 800 Units/hr, IntraVENous, Continuous  magnesium sulfate 2000 mg in 50 mL IVPB premix, 2,000 mg, IntraVENous, Once  heparin (porcine) injection 3,700 Units, 3,700 Units, IntraVENous, Once  doxycycline (VIBRAMYCIN) 100 mg in sodium chloride 0.9 % 100 mL IVPB ([id number]), 100 mg, IntraVENous, Q12H  sodium chloride flush 0.9 % injection 5-40 mL, 5-40 mL, IntraVENous, 2 times per day  sodium chloride flush 0.9 % injection 5-40 mL, 5-40 mL, IntraVENous, PRN  0.9 % sodium chloride infusion, , IntraVENous, PRN  potassium 
IU/mL  - No change to Heparin at this time, continue Heparin infusion at _580_ units/hr.  - Recheck Anti-Xa in 6 hours at 1100  Casper Piposar, Pharm D.6/11/2024 7:02 AM    6/11/2024  anti-Xa level = 0.49 IU/mL at 1123  Continue Heparin infusion rate at 580 units/hr   Daily level ordered  Next anti-Xa tomorrow (6/12) AM  Ronit Godwin PharmD  6/11/2024 11:44 AM       
tablet, TAKE 1 TABLET BY MOUTH IN THE MORNING AND ONE TABLET IN THE EVENING., Disp: 180 tablet, Rfl: 0    ELIQUIS 5 MG TABS tablet, TAKE 1 TABLET BY MOUTH TWICE A DAY, Disp: 60 tablet, Rfl: 2    magnesium oxide (MAG-OX) 400 (240 Mg) MG tablet, TAKE 1 TABLET BY MOUTH EVERY DAY, Disp: 90 tablet, Rfl: 0    zolpidem (AMBIEN) 5 MG tablet, TAKE 1 TABLET BY MOUTH NIGHTLY AS NEEDED FOR SLEEP MAX DAILY 1TAB, Disp: 30 tablet, Rfl: 2    traZODone (DESYREL) 50 MG tablet, TAKE 1/2 TABLETS BY MOUTH NIGHTLY FOR SLEEP, Disp: 45 tablet, Rfl: 0    fluticasone (FLONASE) 50 MCG/ACT nasal spray, INHALE 1 SPRAY INTO EACH NOSTRIL EVERY DAY, Disp: 3 each, Rfl: 3    solifenacin (VESICARE) 5 MG tablet, TAKE 1 TABLET BY MOUTH EVERY DAY, Disp: 90 tablet, Rfl: 0    levothyroxine (SYNTHROID) 150 MCG tablet, TAKE 1 TABLET BY MOUTH EVERY DAY, Disp: 90 tablet, Rfl: 3    sucralfate (CARAFATE) 1 GM tablet, TAKE 1 TABLET BY MOUTH FOUR TIMES A DAY, Disp: 360 tablet, Rfl: 0    pantoprazole (PROTONIX) 40 MG tablet, TAKE 1 TABLET BY MOUTH IN THE MORNING AND 1 TABLET IN THE EVENING BEFORE MEALS, Disp: 180 tablet, Rfl: 0    vitamin D (CHOLECALCIFEROL) 1000 UNIT TABS tablet, Take 4 tablets by mouth daily, Disp: , Rfl:     Multiple Vitamins-Minerals (THERAPEUTIC MULTIVITAMIN-MINERALS) tablet, Take 1 tablet by mouth daily, Disp: , Rfl:     Omega-3 Fatty Acids (FISH OIL) 1000 MG CAPS, Take 1 capsule by mouth 3 times daily, Disp: , Rfl:     leflunomide (ARAVA) 20 MG tablet, Take 1 tablet by mouth daily, Disp: , Rfl:      Continuous Infusions:      PRN Meds:      Allergies:  Patient is allergic to adhesive tape and amlodipine.    REVIEW OF SYSTEMS:  Review of Systems   Constitutional:  Negative for activity change, appetite change, chills, diaphoresis and fatigue.   HENT:  Negative for congestion, sore throat and trouble swallowing.    Eyes:  Negative for pain, discharge, redness and itching.   Respiratory:  Negative for cough, shortness of breath, wheezing and 
valve area by     the peak velocity method is 1.7cm^2. The valve area by the mean velocity method is 2.5cm^2.   - Mitral valve: Mild regurgitation.   - Right ventricle: Systolic function was normal.     Stress:     Cath: 2016  As below. Critical complex mid LAD stenosis extending proximally and involving ostium of mod-sized diagonal. Successful YARI LAD and provisional PTCA diag os. RCA small diffusely disease with some left-to-right collaterals. Plan med tx for same.       ASSESSMENT & PLAN:    Elevated troponin likely type II MI  History of CAD and PCI to LAD  Peripheral vascular disease  Hypertension  Hyperlipidemia    She is on Eliquis in outpatient which is confirmed but indication is not clear    No anginal or anginal equivalent symptoms currently or in the recent days.  No clinical signs or symptoms of heart failure.  She is here with small bowel obstruction and is being managed accordingly.    Can stop trending cardiac markers as they are stable to trending down  Obtain TTE to assess LV function and wall motion abnormalities  Reasonable to continue IV heparin for 48 hours, longer if home Eliquis needs to be held longer due to her n.p.o. status  Resume oral meds including antihypertensive regimen once she is cleared for oral intake  If significant LV function or wall motion abnormalities noted, will discuss need for ischemia evaluation with the patient who at the age of 89 is not very enthusiastic about undergoing invasive procedures such as coronary angiogram or PCI's especially since she does not have any active anginal symptoms and the primary reason she came to the hospital was GI complaints  Cardiology will continue to follow    All questions and concerns were addressed to the patient/family. Alternatives to my treatment as well as risks and benefits of proposed treatment were discussed.     Thank you for allowing to us to participate in the care or Vj Gold. Please call with questions.         Kai

## 2024-06-10 ENCOUNTER — APPOINTMENT (OUTPATIENT)
Dept: GENERAL RADIOLOGY | Age: 89
DRG: 388 | End: 2024-06-10
Payer: MEDICARE

## 2024-06-10 ENCOUNTER — APPOINTMENT (OUTPATIENT)
Age: 89
DRG: 388 | End: 2024-06-10
Attending: INTERNAL MEDICINE
Payer: MEDICARE

## 2024-06-10 PROBLEM — R79.89 ELEVATED TROPONIN: Status: ACTIVE | Noted: 2024-06-10

## 2024-06-10 LAB
ALBUMIN SERPL-MCNC: 3.1 G/DL (ref 3.4–5)
ALBUMIN/GLOB SERPL: 1.5 {RATIO} (ref 1.1–2.2)
ALP SERPL-CCNC: 66 U/L (ref 40–129)
ALT SERPL-CCNC: 16 U/L (ref 10–40)
ANION GAP SERPL CALCULATED.3IONS-SCNC: 12 MMOL/L (ref 3–16)
APTT BLD: 119.9 SEC (ref 22.1–36.4)
APTT BLD: 63.6 SEC (ref 22.1–36.4)
APTT BLD: 77.2 SEC (ref 22.1–36.4)
AST SERPL-CCNC: 31 U/L (ref 15–37)
BASOPHILS # BLD: 0.1 K/UL (ref 0–0.2)
BASOPHILS NFR BLD: 0.9 %
BILIRUB SERPL-MCNC: 0.4 MG/DL (ref 0–1)
BUN SERPL-MCNC: 17 MG/DL (ref 7–20)
CALCIUM SERPL-MCNC: 8.1 MG/DL (ref 8.3–10.6)
CHLORIDE SERPL-SCNC: 100 MMOL/L (ref 99–110)
CO2 SERPL-SCNC: 27 MMOL/L (ref 21–32)
CREAT SERPL-MCNC: <0.5 MG/DL (ref 0.6–1.2)
DEPRECATED RDW RBC AUTO: 15 % (ref 12.4–15.4)
ECHO AO ROOT DIAM: 2.2 CM
ECHO AO ROOT INDEX: 1.26 CM/M2
ECHO AV CUSP MM: 1.7 CM
ECHO AV PEAK GRADIENT: 8 MMHG
ECHO AV PEAK VELOCITY: 1.5 M/S
ECHO BSA: 1.78 M2
ECHO EST RA PRESSURE: 3 MMHG
ECHO LA AREA 2C: 20.7 CM2
ECHO LA AREA 4C: 27.9 CM2
ECHO LA DIAMETER INDEX: 2.01 CM/M2
ECHO LA DIAMETER: 3.5 CM
ECHO LA MAJOR AXIS: 6.6 CM
ECHO LA MINOR AXIS: 6 CM
ECHO LA TO AORTIC ROOT RATIO: 1.59
ECHO LA VOL BP: 78 ML (ref 22–52)
ECHO LA VOL MOD A2C: 57 ML (ref 22–52)
ECHO LA VOL MOD A4C: 99 ML (ref 22–52)
ECHO LA VOL/BSA BIPLANE: 45 ML/M2 (ref 16–34)
ECHO LA VOLUME INDEX MOD A2C: 33 ML/M2 (ref 16–34)
ECHO LA VOLUME INDEX MOD A4C: 57 ML/M2 (ref 16–34)
ECHO LV E' LATERAL VELOCITY: 9 CM/S
ECHO LV E' SEPTAL VELOCITY: 14 CM/S
ECHO LV FRACTIONAL SHORTENING: 38 % (ref 28–44)
ECHO LV INTERNAL DIMENSION DIASTOLE INDEX: 2.13 CM/M2
ECHO LV INTERNAL DIMENSION DIASTOLIC: 3.7 CM (ref 3.9–5.3)
ECHO LV INTERNAL DIMENSION SYSTOLIC INDEX: 1.32 CM/M2
ECHO LV INTERNAL DIMENSION SYSTOLIC: 2.3 CM
ECHO LV IVSD: 1 CM (ref 0.6–0.9)
ECHO LV MASS 2D: 120.8 G (ref 67–162)
ECHO LV MASS INDEX 2D: 69.4 G/M2 (ref 43–95)
ECHO LV POSTERIOR WALL DIASTOLIC: 1.1 CM (ref 0.6–0.9)
ECHO LV RELATIVE WALL THICKNESS RATIO: 0.59
ECHO MV A VELOCITY: 0.71 M/S
ECHO MV E DECELERATION TIME (DT): 201 MS
ECHO MV E VELOCITY: 0.75 M/S
ECHO MV E/A RATIO: 1.06
ECHO MV E/E' LATERAL: 8.33
ECHO MV E/E' RATIO (AVERAGED): 6.85
ECHO MV E/E' SEPTAL: 5.36
ECHO PULMONARY ARTERY END DIASTOLIC PRESSURE: 6 MMHG
ECHO PV MAX VELOCITY: 1.2 M/S
ECHO RA AREA 4C: 15.9 CM2
ECHO RA END SYSTOLIC VOLUME APICAL 4 CHAMBER INDEX BSA: 22 ML/M2
ECHO RA VOLUME: 39 ML
ECHO RIGHT VENTRICULAR SYSTOLIC PRESSURE (RVSP): 42 MMHG
ECHO RV BASAL DIMENSION: 3.1 CM
ECHO RV LONGITUDINAL DIMENSION: 6.4 CM
ECHO RV MID DIMENSION: 2.8 CM
ECHO RV TAPSE: 2 CM (ref 1.7–?)
ECHO TV E WAVE: 0.6 M/S
ECHO TV REGURGITANT MAX VELOCITY: 3.14 M/S
ECHO TV REGURGITANT PEAK GRADIENT: 39 MMHG
EOSINOPHIL # BLD: 0.3 K/UL (ref 0–0.6)
EOSINOPHIL NFR BLD: 4.1 %
GFR SERPLBLD CREATININE-BSD FMLA CKD-EPI: 85 ML/MIN/{1.73_M2}
GLUCOSE BLD-MCNC: 101 MG/DL (ref 70–99)
GLUCOSE BLD-MCNC: 226 MG/DL (ref 70–99)
GLUCOSE BLD-MCNC: 74 MG/DL (ref 70–99)
GLUCOSE BLD-MCNC: 81 MG/DL (ref 70–99)
GLUCOSE SERPL-MCNC: 75 MG/DL (ref 70–99)
HCT VFR BLD AUTO: 35.6 % (ref 36–48)
HGB BLD-MCNC: 11.9 G/DL (ref 12–16)
LYMPHOCYTES # BLD: 1 K/UL (ref 1–5.1)
LYMPHOCYTES NFR BLD: 15.1 %
MAGNESIUM SERPL-MCNC: 1.9 MG/DL (ref 1.8–2.4)
MCH RBC QN AUTO: 32.3 PG (ref 26–34)
MCHC RBC AUTO-ENTMCNC: 33.3 G/DL (ref 31–36)
MCV RBC AUTO: 96.9 FL (ref 80–100)
MONOCYTES # BLD: 0.6 K/UL (ref 0–1.3)
MONOCYTES NFR BLD: 9.9 %
NEUTROPHILS # BLD: 4.5 K/UL (ref 1.7–7.7)
NEUTROPHILS NFR BLD: 70 %
PERFORMED ON: ABNORMAL
PERFORMED ON: ABNORMAL
PERFORMED ON: NORMAL
PERFORMED ON: NORMAL
PLATELET # BLD AUTO: 158 K/UL (ref 135–450)
PMV BLD AUTO: 8.7 FL (ref 5–10.5)
POTASSIUM SERPL-SCNC: 3.4 MMOL/L (ref 3.5–5.1)
PROT SERPL-MCNC: 5.2 G/DL (ref 6.4–8.2)
RBC # BLD AUTO: 3.67 M/UL (ref 4–5.2)
SODIUM SERPL-SCNC: 139 MMOL/L (ref 136–145)
WBC # BLD AUTO: 6.5 K/UL (ref 4–11)

## 2024-06-10 PROCEDURE — 97110 THERAPEUTIC EXERCISES: CPT

## 2024-06-10 PROCEDURE — 74019 RADEX ABDOMEN 2 VIEWS: CPT

## 2024-06-10 PROCEDURE — 6360000002 HC RX W HCPCS: Performed by: INTERNAL MEDICINE

## 2024-06-10 PROCEDURE — 2580000003 HC RX 258: Performed by: INTERNAL MEDICINE

## 2024-06-10 PROCEDURE — 97530 THERAPEUTIC ACTIVITIES: CPT

## 2024-06-10 PROCEDURE — 2500000003 HC RX 250 WO HCPCS: Performed by: STUDENT IN AN ORGANIZED HEALTH CARE EDUCATION/TRAINING PROGRAM

## 2024-06-10 PROCEDURE — 80053 COMPREHEN METABOLIC PANEL: CPT

## 2024-06-10 PROCEDURE — 2060000000 HC ICU INTERMEDIATE R&B

## 2024-06-10 PROCEDURE — 83735 ASSAY OF MAGNESIUM: CPT

## 2024-06-10 PROCEDURE — 85730 THROMBOPLASTIN TIME PARTIAL: CPT

## 2024-06-10 PROCEDURE — 85025 COMPLETE CBC W/AUTO DIFF WBC: CPT

## 2024-06-10 PROCEDURE — 2580000003 HC RX 258: Performed by: STUDENT IN AN ORGANIZED HEALTH CARE EDUCATION/TRAINING PROGRAM

## 2024-06-10 PROCEDURE — C8929 TTE W OR WO FOL WCON,DOPPLER: HCPCS

## 2024-06-10 PROCEDURE — 94761 N-INVAS EAR/PLS OXIMETRY MLT: CPT

## 2024-06-10 PROCEDURE — 6370000000 HC RX 637 (ALT 250 FOR IP): Performed by: INTERNAL MEDICINE

## 2024-06-10 PROCEDURE — 2500000003 HC RX 250 WO HCPCS: Performed by: INTERNAL MEDICINE

## 2024-06-10 PROCEDURE — C9113 INJ PANTOPRAZOLE SODIUM, VIA: HCPCS | Performed by: INTERNAL MEDICINE

## 2024-06-10 PROCEDURE — 2700000000 HC OXYGEN THERAPY PER DAY

## 2024-06-10 PROCEDURE — APPNB45 APP NON BILLABLE 31-45 MINUTES: Performed by: CLINICAL NURSE SPECIALIST

## 2024-06-10 PROCEDURE — 99232 SBSQ HOSP IP/OBS MODERATE 35: CPT | Performed by: SURGERY

## 2024-06-10 PROCEDURE — 36415 COLL VENOUS BLD VENIPUNCTURE: CPT

## 2024-06-10 RX ORDER — HEPARIN SODIUM 1000 [USP'U]/ML
1800 INJECTION, SOLUTION INTRAVENOUS; SUBCUTANEOUS ONCE
Status: COMPLETED | OUTPATIENT
Start: 2024-06-10 | End: 2024-06-10

## 2024-06-10 RX ORDER — ASPIRIN 81 MG/1
81 TABLET, CHEWABLE ORAL DAILY
Status: DISCONTINUED | OUTPATIENT
Start: 2024-06-11 | End: 2024-06-12 | Stop reason: HOSPADM

## 2024-06-10 RX ADMIN — SODIUM CHLORIDE: 9 INJECTION, SOLUTION INTRAVENOUS at 21:41

## 2024-06-10 RX ADMIN — LEVOTHYROXINE SODIUM ANHYDROUS 100 MCG: 100 INJECTION, POWDER, LYOPHILIZED, FOR SOLUTION INTRAVENOUS at 06:18

## 2024-06-10 RX ADMIN — DOXYCYCLINE 100 MG: 100 INJECTION, POWDER, LYOPHILIZED, FOR SOLUTION INTRAVENOUS at 17:30

## 2024-06-10 RX ADMIN — POTASSIUM CHLORIDE 40 MEQ: 1500 TABLET, EXTENDED RELEASE ORAL at 18:44

## 2024-06-10 RX ADMIN — DOXYCYCLINE 100 MG: 100 INJECTION, POWDER, LYOPHILIZED, FOR SOLUTION INTRAVENOUS at 04:27

## 2024-06-10 RX ADMIN — SODIUM CHLORIDE, PRESERVATIVE FREE 10 ML: 5 INJECTION INTRAVENOUS at 09:12

## 2024-06-10 RX ADMIN — METOPROLOL TARTRATE 5 MG: 5 INJECTION INTRAVENOUS at 02:23

## 2024-06-10 RX ADMIN — METOPROLOL TARTRATE 5 MG: 5 INJECTION INTRAVENOUS at 09:11

## 2024-06-10 RX ADMIN — HEPARIN SODIUM AND DEXTROSE 580 UNITS/HR: 10000; 5 INJECTION INTRAVENOUS at 04:25

## 2024-06-10 RX ADMIN — HEPARIN SODIUM 1800 UNITS: 1000 INJECTION INTRAVENOUS; SUBCUTANEOUS at 17:35

## 2024-06-10 RX ADMIN — METOPROLOL TARTRATE 5 MG: 5 INJECTION INTRAVENOUS at 17:20

## 2024-06-10 RX ADMIN — PANTOPRAZOLE SODIUM 40 MG: 40 INJECTION, POWDER, FOR SOLUTION INTRAVENOUS at 09:11

## 2024-06-11 ENCOUNTER — APPOINTMENT (OUTPATIENT)
Dept: GENERAL RADIOLOGY | Age: 89
DRG: 388 | End: 2024-06-11
Payer: MEDICARE

## 2024-06-11 LAB
ALBUMIN SERPL-MCNC: 2.9 G/DL (ref 3.4–5)
ALBUMIN/GLOB SERPL: 1.3 {RATIO} (ref 1.1–2.2)
ALP SERPL-CCNC: 70 U/L (ref 40–129)
ALT SERPL-CCNC: 15 U/L (ref 10–40)
ANION GAP SERPL CALCULATED.3IONS-SCNC: 9 MMOL/L (ref 3–16)
ANTI-XA UNFRAC HEPARIN: 0.49 IU/ML (ref 0.3–0.7)
ANTI-XA UNFRAC HEPARIN: 0.6 IU/ML (ref 0.3–0.7)
APTT BLD: 121.6 SEC (ref 22.1–36.4)
AST SERPL-CCNC: 25 U/L (ref 15–37)
BASOPHILS # BLD: 0 K/UL (ref 0–0.2)
BASOPHILS NFR BLD: 0.8 %
BILIRUB SERPL-MCNC: 0.4 MG/DL (ref 0–1)
BUN SERPL-MCNC: 14 MG/DL (ref 7–20)
CALCIUM SERPL-MCNC: 7.9 MG/DL (ref 8.3–10.6)
CHLORIDE SERPL-SCNC: 105 MMOL/L (ref 99–110)
CO2 SERPL-SCNC: 24 MMOL/L (ref 21–32)
CREAT SERPL-MCNC: <0.5 MG/DL (ref 0.6–1.2)
DEPRECATED RDW RBC AUTO: 15 % (ref 12.4–15.4)
EOSINOPHIL # BLD: 0.2 K/UL (ref 0–0.6)
EOSINOPHIL NFR BLD: 3.2 %
GFR SERPLBLD CREATININE-BSD FMLA CKD-EPI: 85 ML/MIN/{1.73_M2}
GLUCOSE SERPL-MCNC: 87 MG/DL (ref 70–99)
HCT VFR BLD AUTO: 35.7 % (ref 36–48)
HGB BLD-MCNC: 11.7 G/DL (ref 12–16)
LEGIONELLA AG UR QL: NORMAL
LYMPHOCYTES # BLD: 1.2 K/UL (ref 1–5.1)
LYMPHOCYTES NFR BLD: 20.9 %
MCH RBC QN AUTO: 31.9 PG (ref 26–34)
MCHC RBC AUTO-ENTMCNC: 32.9 G/DL (ref 31–36)
MCV RBC AUTO: 97.1 FL (ref 80–100)
MONOCYTES # BLD: 0.7 K/UL (ref 0–1.3)
MONOCYTES NFR BLD: 13.3 %
NEUTROPHILS # BLD: 3.5 K/UL (ref 1.7–7.7)
NEUTROPHILS NFR BLD: 61.8 %
PLATELET # BLD AUTO: 164 K/UL (ref 135–450)
PMV BLD AUTO: 8.5 FL (ref 5–10.5)
POTASSIUM SERPL-SCNC: 3.9 MMOL/L (ref 3.5–5.1)
PROCALCITONIN SERPL IA-MCNC: 0.05 NG/ML (ref 0–0.15)
PROT SERPL-MCNC: 5.2 G/DL (ref 6.4–8.2)
RBC # BLD AUTO: 3.68 M/UL (ref 4–5.2)
S PNEUM AG UR QL: NORMAL
SODIUM SERPL-SCNC: 138 MMOL/L (ref 136–145)
WBC # BLD AUTO: 5.6 K/UL (ref 4–11)

## 2024-06-11 PROCEDURE — 80053 COMPREHEN METABOLIC PANEL: CPT

## 2024-06-11 PROCEDURE — 2580000003 HC RX 258: Performed by: INTERNAL MEDICINE

## 2024-06-11 PROCEDURE — 2700000000 HC OXYGEN THERAPY PER DAY

## 2024-06-11 PROCEDURE — 2580000003 HC RX 258: Performed by: STUDENT IN AN ORGANIZED HEALTH CARE EDUCATION/TRAINING PROGRAM

## 2024-06-11 PROCEDURE — 71046 X-RAY EXAM CHEST 2 VIEWS: CPT

## 2024-06-11 PROCEDURE — 2060000000 HC ICU INTERMEDIATE R&B

## 2024-06-11 PROCEDURE — 6360000002 HC RX W HCPCS: Performed by: INTERNAL MEDICINE

## 2024-06-11 PROCEDURE — 36415 COLL VENOUS BLD VENIPUNCTURE: CPT

## 2024-06-11 PROCEDURE — 2500000003 HC RX 250 WO HCPCS: Performed by: STUDENT IN AN ORGANIZED HEALTH CARE EDUCATION/TRAINING PROGRAM

## 2024-06-11 PROCEDURE — 97530 THERAPEUTIC ACTIVITIES: CPT

## 2024-06-11 PROCEDURE — 85025 COMPLETE CBC W/AUTO DIFF WBC: CPT

## 2024-06-11 PROCEDURE — 6370000000 HC RX 637 (ALT 250 FOR IP): Performed by: NURSE PRACTITIONER

## 2024-06-11 PROCEDURE — C9113 INJ PANTOPRAZOLE SODIUM, VIA: HCPCS | Performed by: INTERNAL MEDICINE

## 2024-06-11 PROCEDURE — 94761 N-INVAS EAR/PLS OXIMETRY MLT: CPT

## 2024-06-11 PROCEDURE — 6370000000 HC RX 637 (ALT 250 FOR IP)

## 2024-06-11 PROCEDURE — 97535 SELF CARE MNGMENT TRAINING: CPT

## 2024-06-11 PROCEDURE — 84145 PROCALCITONIN (PCT): CPT

## 2024-06-11 PROCEDURE — 85520 HEPARIN ASSAY: CPT

## 2024-06-11 PROCEDURE — 2500000003 HC RX 250 WO HCPCS: Performed by: INTERNAL MEDICINE

## 2024-06-11 PROCEDURE — 6370000000 HC RX 637 (ALT 250 FOR IP): Performed by: INTERNAL MEDICINE

## 2024-06-11 PROCEDURE — 99231 SBSQ HOSP IP/OBS SF/LOW 25: CPT | Performed by: SURGERY

## 2024-06-11 PROCEDURE — 97116 GAIT TRAINING THERAPY: CPT

## 2024-06-11 PROCEDURE — 85730 THROMBOPLASTIN TIME PARTIAL: CPT

## 2024-06-11 RX ORDER — MECOBALAMIN 5000 MCG
10 TABLET,DISINTEGRATING ORAL NIGHTLY
Status: DISCONTINUED | OUTPATIENT
Start: 2024-06-11 | End: 2024-06-12 | Stop reason: HOSPADM

## 2024-06-11 RX ORDER — ZOLPIDEM TARTRATE 5 MG/1
5 TABLET ORAL NIGHTLY PRN
Status: DISCONTINUED | OUTPATIENT
Start: 2024-06-11 | End: 2024-06-12 | Stop reason: HOSPADM

## 2024-06-11 RX ORDER — CARVEDILOL 3.12 MG/1
3.12 TABLET ORAL 2 TIMES DAILY WITH MEALS
Status: DISCONTINUED | OUTPATIENT
Start: 2024-06-11 | End: 2024-06-12 | Stop reason: HOSPADM

## 2024-06-11 RX ORDER — LANOLIN ALCOHOL/MO/W.PET/CERES
400 CREAM (GRAM) TOPICAL DAILY
Status: DISCONTINUED | OUTPATIENT
Start: 2024-06-11 | End: 2024-06-12 | Stop reason: HOSPADM

## 2024-06-11 RX ORDER — LOSARTAN POTASSIUM 25 MG/1
50 TABLET ORAL 2 TIMES DAILY
Status: DISCONTINUED | OUTPATIENT
Start: 2024-06-11 | End: 2024-06-12 | Stop reason: HOSPADM

## 2024-06-11 RX ORDER — PANTOPRAZOLE SODIUM 40 MG/1
40 TABLET, DELAYED RELEASE ORAL
Status: DISCONTINUED | OUTPATIENT
Start: 2024-06-11 | End: 2024-06-12 | Stop reason: HOSPADM

## 2024-06-11 RX ORDER — LEVOTHYROXINE SODIUM 0.15 MG/1
150 TABLET ORAL DAILY
Status: DISCONTINUED | OUTPATIENT
Start: 2024-06-12 | End: 2024-06-12 | Stop reason: HOSPADM

## 2024-06-11 RX ADMIN — SODIUM CHLORIDE: 9 INJECTION, SOLUTION INTRAVENOUS at 16:16

## 2024-06-11 RX ADMIN — PANTOPRAZOLE SODIUM 40 MG: 40 INJECTION, POWDER, FOR SOLUTION INTRAVENOUS at 08:04

## 2024-06-11 RX ADMIN — ZOLPIDEM TARTRATE 5 MG: 5 TABLET ORAL at 22:31

## 2024-06-11 RX ADMIN — LEVOTHYROXINE SODIUM ANHYDROUS 100 MCG: 100 INJECTION, POWDER, LYOPHILIZED, FOR SOLUTION INTRAVENOUS at 06:29

## 2024-06-11 RX ADMIN — ASPIRIN 81 MG 81 MG: 81 TABLET ORAL at 08:05

## 2024-06-11 RX ADMIN — CARVEDILOL 3.12 MG: 3.12 TABLET, FILM COATED ORAL at 11:49

## 2024-06-11 RX ADMIN — DOXYCYCLINE 100 MG: 100 INJECTION, POWDER, LYOPHILIZED, FOR SOLUTION INTRAVENOUS at 16:20

## 2024-06-11 RX ADMIN — Medication 10 MG: at 20:38

## 2024-06-11 RX ADMIN — Medication 400 MG: at 11:49

## 2024-06-11 RX ADMIN — SODIUM CHLORIDE, PRESERVATIVE FREE 10 ML: 5 INJECTION INTRAVENOUS at 08:06

## 2024-06-11 RX ADMIN — HEPARIN SODIUM AND DEXTROSE 580 UNITS/HR: 10000; 5 INJECTION INTRAVENOUS at 21:13

## 2024-06-11 RX ADMIN — PANTOPRAZOLE SODIUM 40 MG: 40 TABLET, DELAYED RELEASE ORAL at 16:20

## 2024-06-11 RX ADMIN — METOPROLOL TARTRATE 5 MG: 5 INJECTION INTRAVENOUS at 00:19

## 2024-06-11 RX ADMIN — LOSARTAN POTASSIUM 50 MG: 25 TABLET, FILM COATED ORAL at 11:49

## 2024-06-11 RX ADMIN — DOXYCYCLINE 100 MG: 100 INJECTION, POWDER, LYOPHILIZED, FOR SOLUTION INTRAVENOUS at 04:39

## 2024-06-11 RX ADMIN — SODIUM CHLORIDE, PRESERVATIVE FREE 10 ML: 5 INJECTION INTRAVENOUS at 20:39

## 2024-06-11 RX ADMIN — LOSARTAN POTASSIUM 50 MG: 25 TABLET, FILM COATED ORAL at 20:38

## 2024-06-11 RX ADMIN — Medication 10 MG: at 00:19

## 2024-06-11 RX ADMIN — METOPROLOL TARTRATE 5 MG: 5 INJECTION INTRAVENOUS at 08:04

## 2024-06-11 RX ADMIN — CARVEDILOL 3.12 MG: 3.12 TABLET, FILM COATED ORAL at 16:20

## 2024-06-11 NOTE — PLAN OF CARE
Problem: Discharge Planning  Goal: Discharge to home or other facility with appropriate resources  6/11/2024 0951 by Meme Miller RN  Outcome: Progressing     Problem: Safety - Adult  Goal: Free from fall injury  6/11/2024 0951 by Meme Miller RN  Outcome: Progressing     Problem: Skin/Tissue Integrity  Goal: Absence of new skin breakdown  Description: 1.  Monitor for areas of redness and/or skin breakdown  2.  Assess vascular access sites hourly  3.  Every 4-6 hours minimum:  Change oxygen saturation probe site  4.  Every 4-6 hours:  If on nasal continuous positive airway pressure, respiratory therapy assess nares and determine need for appliance change or resting period.  6/11/2024 0951 by Meme Miller RN  Outcome: Progressing     Problem: Pain  Goal: Verbalizes/displays adequate comfort level or baseline comfort level  6/11/2024 0951 by Meme Miller, RN  Outcome: Progressing

## 2024-06-11 NOTE — PLAN OF CARE
Problem: Discharge Planning  Goal: Discharge to home or other facility with appropriate resources  6/11/2024 0033 by Martha Barragan RN  Outcome: Progressing     Problem: Safety - Adult  Goal: Free from fall injury  6/11/2024 0033 by Martha Barragan RN  Outcome: Progressing  Note: Pt will remain free from falls throughout hospital stay. Fall precautions in place, bed alarm on, bed in lowest position with wheels locked and side rails 2/4 up. Room door open and hourly rounding completed. Will continue to monitor throughout shift.      Problem: Skin/Tissue Integrity  Goal: Absence of new skin breakdown  Description: 1.  Monitor for areas of redness and/or skin breakdown  2.  Assess vascular access sites hourly  3.  Every 4-6 hours minimum:  Change oxygen saturation probe site  4.  Every 4-6 hours:  If on nasal continuous positive airway pressure, respiratory therapy assess nares and determine need for appliance change or resting period.  6/11/2024 0033 by Martha Barragan RN  Outcome: Progressing     Problem: ABCDS Injury Assessment  Goal: Absence of physical injury  6/11/2024 0033 by Martha Barragan RN  Outcome: Progressing     Problem: Nutrition Deficit:  Goal: Optimize nutritional status  Outcome: Progressing     Problem: Pain  Goal: Verbalizes/displays adequate comfort level or baseline comfort level  Outcome: Progressing  Note: Pt will be satisfied with pain control. Pt uses numeric pain rating scale with reassessments after pain med administration. Will continue to monitor progression throughout shift.

## 2024-06-12 VITALS
DIASTOLIC BLOOD PRESSURE: 93 MMHG | SYSTOLIC BLOOD PRESSURE: 149 MMHG | OXYGEN SATURATION: 98 % | WEIGHT: 160 LBS | HEART RATE: 78 BPM | TEMPERATURE: 97.7 F | RESPIRATION RATE: 22 BRPM | BODY MASS INDEX: 29.44 KG/M2 | HEIGHT: 62 IN

## 2024-06-12 LAB
ANION GAP SERPL CALCULATED.3IONS-SCNC: 13 MMOL/L (ref 3–16)
ANTI-XA UNFRAC HEPARIN: 0.23 IU/ML (ref 0.3–0.7)
BACTERIA BLD CULT ORG #2: NORMAL
BACTERIA BLD CULT: NORMAL
BUN SERPL-MCNC: 12 MG/DL (ref 7–20)
CALCIUM SERPL-MCNC: 8.4 MG/DL (ref 8.3–10.6)
CHLORIDE SERPL-SCNC: 105 MMOL/L (ref 99–110)
CO2 SERPL-SCNC: 21 MMOL/L (ref 21–32)
CREAT SERPL-MCNC: <0.5 MG/DL (ref 0.6–1.2)
DEPRECATED RDW RBC AUTO: 15.4 % (ref 12.4–15.4)
GFR SERPLBLD CREATININE-BSD FMLA CKD-EPI: 85 ML/MIN/{1.73_M2}
GLUCOSE BLD-MCNC: 108 MG/DL (ref 70–99)
GLUCOSE BLD-MCNC: 124 MG/DL (ref 70–99)
GLUCOSE BLD-MCNC: 131 MG/DL (ref 70–99)
GLUCOSE BLD-MCNC: 151 MG/DL (ref 70–99)
GLUCOSE SERPL-MCNC: 105 MG/DL (ref 70–99)
HCT VFR BLD AUTO: 45.6 % (ref 36–48)
HGB BLD-MCNC: 14.8 G/DL (ref 12–16)
MCH RBC QN AUTO: 32.3 PG (ref 26–34)
MCHC RBC AUTO-ENTMCNC: 32.4 G/DL (ref 31–36)
MCV RBC AUTO: 99.9 FL (ref 80–100)
PERFORMED ON: ABNORMAL
PLATELET # BLD AUTO: 139 K/UL (ref 135–450)
PLATELET BLD QL SMEAR: ADEQUATE
PMV BLD AUTO: 9.1 FL (ref 5–10.5)
POTASSIUM SERPL-SCNC: 3.8 MMOL/L (ref 3.5–5.1)
RBC # BLD AUTO: 4.56 M/UL (ref 4–5.2)
SLIDE REVIEW: NORMAL
SODIUM SERPL-SCNC: 139 MMOL/L (ref 136–145)
WBC # BLD AUTO: 6.9 K/UL (ref 4–11)

## 2024-06-12 PROCEDURE — 85520 HEPARIN ASSAY: CPT

## 2024-06-12 PROCEDURE — 2580000003 HC RX 258: Performed by: INTERNAL MEDICINE

## 2024-06-12 PROCEDURE — 6370000000 HC RX 637 (ALT 250 FOR IP)

## 2024-06-12 PROCEDURE — 6370000000 HC RX 637 (ALT 250 FOR IP): Performed by: SURGERY

## 2024-06-12 PROCEDURE — 94761 N-INVAS EAR/PLS OXIMETRY MLT: CPT

## 2024-06-12 PROCEDURE — 85027 COMPLETE CBC AUTOMATED: CPT

## 2024-06-12 PROCEDURE — 97530 THERAPEUTIC ACTIVITIES: CPT

## 2024-06-12 PROCEDURE — 36415 COLL VENOUS BLD VENIPUNCTURE: CPT

## 2024-06-12 PROCEDURE — 2580000003 HC RX 258: Performed by: STUDENT IN AN ORGANIZED HEALTH CARE EDUCATION/TRAINING PROGRAM

## 2024-06-12 PROCEDURE — 99232 SBSQ HOSP IP/OBS MODERATE 35: CPT | Performed by: SURGERY

## 2024-06-12 PROCEDURE — 2700000000 HC OXYGEN THERAPY PER DAY

## 2024-06-12 PROCEDURE — 97535 SELF CARE MNGMENT TRAINING: CPT

## 2024-06-12 PROCEDURE — 80048 BASIC METABOLIC PNL TOTAL CA: CPT

## 2024-06-12 PROCEDURE — 2500000003 HC RX 250 WO HCPCS: Performed by: STUDENT IN AN ORGANIZED HEALTH CARE EDUCATION/TRAINING PROGRAM

## 2024-06-12 PROCEDURE — 6370000000 HC RX 637 (ALT 250 FOR IP): Performed by: INTERNAL MEDICINE

## 2024-06-12 RX ORDER — ASPIRIN 81 MG/1
81 TABLET, CHEWABLE ORAL DAILY
Qty: 30 TABLET | Refills: 3 | Status: SHIPPED | OUTPATIENT
Start: 2024-06-13

## 2024-06-12 RX ORDER — PSEUDOEPHEDRINE HCL 30 MG
100 TABLET ORAL DAILY PRN
Qty: 30 CAPSULE | Refills: 0 | Status: SHIPPED | OUTPATIENT
Start: 2024-06-12

## 2024-06-12 RX ORDER — BISACODYL 5 MG/1
5 TABLET, DELAYED RELEASE ORAL DAILY PRN
Qty: 30 TABLET | Refills: 0 | Status: SHIPPED | OUTPATIENT
Start: 2024-06-12

## 2024-06-12 RX ORDER — DOXYCYCLINE HYCLATE 100 MG
100 TABLET ORAL EVERY 12 HOURS SCHEDULED
Qty: 1 TABLET | Refills: 0 | Status: SHIPPED | OUTPATIENT
Start: 2024-06-12 | End: 2024-06-13

## 2024-06-12 RX ORDER — DOCUSATE SODIUM 100 MG/1
100 CAPSULE, LIQUID FILLED ORAL DAILY
Status: DISCONTINUED | OUTPATIENT
Start: 2024-06-12 | End: 2024-06-12 | Stop reason: HOSPADM

## 2024-06-12 RX ORDER — DOXYCYCLINE HYCLATE 100 MG
100 TABLET ORAL EVERY 12 HOURS SCHEDULED
Status: COMPLETED | OUTPATIENT
Start: 2024-06-12 | End: 2024-06-12

## 2024-06-12 RX ADMIN — APIXABAN 5 MG: 5 TABLET, FILM COATED ORAL at 09:27

## 2024-06-12 RX ADMIN — CARVEDILOL 3.12 MG: 3.12 TABLET, FILM COATED ORAL at 18:02

## 2024-06-12 RX ADMIN — PANTOPRAZOLE SODIUM 40 MG: 40 TABLET, DELAYED RELEASE ORAL at 18:02

## 2024-06-12 RX ADMIN — PANTOPRAZOLE SODIUM 40 MG: 40 TABLET, DELAYED RELEASE ORAL at 06:02

## 2024-06-12 RX ADMIN — CARVEDILOL 3.12 MG: 3.12 TABLET, FILM COATED ORAL at 09:27

## 2024-06-12 RX ADMIN — DOXYCYCLINE HYCLATE 100 MG: 100 TABLET, COATED ORAL at 18:02

## 2024-06-12 RX ADMIN — LOSARTAN POTASSIUM 50 MG: 25 TABLET, FILM COATED ORAL at 09:27

## 2024-06-12 RX ADMIN — LEVOTHYROXINE SODIUM 150 MCG: 0.15 TABLET ORAL at 06:02

## 2024-06-12 RX ADMIN — DOXYCYCLINE 100 MG: 100 INJECTION, POWDER, LYOPHILIZED, FOR SOLUTION INTRAVENOUS at 04:22

## 2024-06-12 RX ADMIN — ASPIRIN 81 MG 81 MG: 81 TABLET ORAL at 09:27

## 2024-06-12 RX ADMIN — DOCUSATE SODIUM 100 MG: 100 CAPSULE, LIQUID FILLED ORAL at 12:34

## 2024-06-12 RX ADMIN — SODIUM CHLORIDE, PRESERVATIVE FREE 10 ML: 5 INJECTION INTRAVENOUS at 09:28

## 2024-06-12 NOTE — PROGRESS NOTES
Echocardiogram results reviewed.  Normal LV function with small septal wall motion abnormality.  Overall, findings are unimpressive and valvular function is intact.      Given absence of heart failure or anginal symptoms, continued medical management is advised    Patient is not in favor of pursuing detailed ischemia evaluation   Patient should resume follow-up with Dr. Adams, at [facility] following discharge  Resume aspirin 81, can transition to Eliquis from IV heparin whenever deemed suitable by primary service and oral intake is resumed  Nothing further to add, cardiology will sign off    Please call with questions.      Kai Daugherty MD, FACC, [facility]  Interventional Cardiology  [facility]  [phone] (c)    
    [facility]    PATIENT NAME: Vj Gold     TODAY'S DATE: 6/10/2024    CHIEF COMPLAINT: none    INTERVAL HISTORY/HPI:    Pt denies abd pain or nausea. No bloating. No BMs. .     REVIEW OF SYSTEMS:  Pertinent positives and negatives as per interval history section    OBJECTIVE:  VITALS:  /73   Pulse 72   Temp 97.9 °F (36.6 °C) (Oral)   Resp 18   Ht 1.575 m (5' 2\")   Wt 72.6 kg (160 lb)   SpO2 93%   BMI 29.26 kg/m²     INTAKE/OUTPUT:    I/O last 3 completed shifts:  In: 284.8 [I.V.:71.3; IV Piggyback:213.6]  Out: 1350 [Urine:1050; Emesis/NG output:300]  No intake/output data recorded.    CONSTITUTIONAL:  awake and alert  LUNGS:  Respirations easy and unlabored, no crackles or wheezing  CARD:  regular rate and rhythm  ABDOMEN:  normal bowel sounds, soft, non-distended, non-tender     Data:  CBC:   Recent Labs     06/08/24  1919 06/09/24  0323 06/10/24  0216   WBC 11.6* 8.7 6.5   HGB 13.5 12.5 11.9*   HCT 42.0 37.3 35.6*    170 158     BMP:    Recent Labs     06/08/24  1131 06/09/24  0323 06/10/24  0216   * 133* 139   K 4.6 3.5 3.4*   CL 93* 94* 100   CO2 29 29 27   BUN 12 18 17   CREATININE <0.5* <0.5* <0.5*   GLUCOSE 139* 102* 75     Hepatic:   Recent Labs     06/08/24  1131 06/09/24  0323 06/10/24  0216   AST 35 32 31   ALT 19 15 16   BILITOT 0.5 0.5 0.4   ALKPHOS 96 71 66     Mag:      Recent Labs     06/09/24  0323 06/10/24  0216   MG 1.60* 1.90      Phos:   No results for input(s): \"PHOS\" in the last 72 hours.   INR:   Recent Labs     06/08/24 1919   INR 1.23*       Radiology Review:  *Imaging personally reviewed by me.   EXAMINATION:  TWO XRAY VIEWS OF THE ABDOMEN    6/10/2024 8:49 am    COMPARISON:  06/08/2024 radiograph    HISTORY:  ORDERING SYSTEM PROVIDED HISTORY: sbo  TECHNOLOGIST PROVIDED HISTORY:  Reason for exam:->sbo    FINDINGS:  Stable enteric tube.  Nonobstructed bowel gas pattern with decompression of  air-filled loops of small bowel observed on prior 
    [facility] SURGERY    PATIENT NAME: Vj Gold     TODAY'S DATE: 6/11/2024    CHIEF COMPLAINT: none    INTERVAL HISTORY/HPI:    Pt resting comfortably.  Taking clears ok, denies nausea, abdominal pain.  Passing gas but no BMs.  Has questions about her chronic CPAP use.     OBJECTIVE:  VITALS:  BP (!) 168/81   Pulse 69   Temp 98.2 °F (36.8 °C) (Oral)   Resp 18   Ht 1.575 m (5' 2\")   Wt 72.6 kg (160 lb)   SpO2 97%   BMI 29.26 kg/m²     INTAKE/OUTPUT:    I/O last 3 completed shifts:  In: 600 [P.O.:600]  Out: 1000 [Urine:1000]  No intake/output data recorded.    CONSTITUTIONAL:  awake and alert  LUNGS:     ABDOMEN:   , soft, non-distended, non-tender     Data:  CBC:   Recent Labs     06/09/24  0323 06/10/24  0216 06/11/24  0445   WBC 8.7 6.5 5.6   HGB 12.5 11.9* 11.7*   HCT 37.3 35.6* 35.7*    158 164     BMP:    Recent Labs     06/09/24  0323 06/10/24  0216 06/11/24  0445   * 139 138   K 3.5 3.4* 3.9   CL 94* 100 105   CO2 29 27 24   BUN 18 17 14   CREATININE <0.5* <0.5* <0.5*   GLUCOSE 102* 75 87     Hepatic:   Recent Labs     06/09/24  0323 06/10/24  0216 06/11/24  0445   AST 32 31 25   ALT 15 16 15   BILITOT 0.5 0.4 0.4   ALKPHOS 71 66 70     Mag:      Recent Labs     06/09/24  0323 06/10/24  0216   MG 1.60* 1.90      Phos:   No results for input(s): \"PHOS\" in the last 72 hours.   INR:   Recent Labs     06/08/24  1919   INR 1.23*       Radiology Review:         ASSESSMENT AND PLAN:  SBO, clinically improving/resolving   - trial advancing to full liquids   - check w/ Hospital Medicine re: CPAP use   - will follow    Electronically signed by RALEIGH MORE MD     
    [facility] SURGERY    PATIENT NAME: Vj Gold     TODAY'S DATE: 6/12/2024    CHIEF COMPLAINT: none    INTERVAL HISTORY/HPI:    Pt without pain or nausea, small bms and flatus     OBJECTIVE:  VITALS:  BP (!) 178/84   Pulse 85   Temp 98.1 °F (36.7 °C) (Oral)   Resp 18   Ht 1.575 m (5' 2\")   Wt 72.6 kg (160 lb)   SpO2 96%   BMI 29.26 kg/m²     INTAKE/OUTPUT:    I/O last 3 completed shifts:  In: 1320 [P.O.:1320]  Out: 1700 [Urine:1700]  I/O this shift:  In: 200 [P.O.:200]  Out: -     CONSTITUTIONAL:  awake and alert  LUNGS:     ABDOMEN:   , soft, non-distended, non-tender     Data:  CBC:   Recent Labs     06/10/24  0216 06/11/24  0445 06/12/24  0708   WBC 6.5 5.6 6.9   HGB 11.9* 11.7* 14.8   HCT 35.6* 35.7* 45.6    164 139       BMP:    Recent Labs     06/10/24  0216 06/11/24  0445 06/12/24  0708    138 139   K 3.4* 3.9 3.8    105 105   CO2 27 24 21   BUN 17 14 12   CREATININE <0.5* <0.5* <0.5*   GLUCOSE 75 87 105*       Hepatic:   Recent Labs     06/10/24  0216 06/11/24  0445   AST 31 25   ALT 16 15   BILITOT 0.4 0.4   ALKPHOS 66 70       Mag:      Recent Labs     06/10/24  0216   MG 1.90        Phos:   No results for input(s): \"PHOS\" in the last 72 hours.   INR:   No results for input(s): \"INR\" in the last 72 hours.      Radiology Review:         ASSESSMENT AND PLAN:  SBO, clinically improving/resolving   - advance to solid food   - stool softener   - ok for discharge if tolerating diet and having bms    Electronically signed by Mane Wilson MD     
   06/08/24 1908   RT Protocol   History Pulmonary Disease 0   Respiratory pattern 0   Breath sounds 2   Cough 0   Indications for Bronchodilator Therapy None   Bronchodilator Assessment Score 2       
Comprehensive Nutrition Assessment    Type and Reason for Visit:  Initial, Positive Nutrition Screen    Nutrition Recommendations/Plan:   Continue NPO - ADAT per MD  Monitor ability to advance diet vs need for alternative nutrition  Obtain actual weight if possible  Monitor clinical progress, nutrition adequacy, weights, pertinent labs, BMs     Malnutrition Assessment:  Malnutrition Status:  At risk for malnutrition (Comment) (06/10/24 1230)    Context:  Acute Illness       Nutrition Assessment:    Positive nutrition screen for weight loss and decreased appetite. Pt with PMHx of CAD, HTN, HLD, admitted with NSTEMI and CT A/P showed high-grade partial SBO. Currently NPO with NG to LIWS. Pt reports that her appetite is uaually okay. Pt denied having any changes in appetite recently. Pt denied having any significant weight changes. Difficult to assess weight change d/t lack of actual CBW. Will order updated weight. Pt denied need for ONS when diet is advanced. Will monitor.    Nutrition Related Findings:    +1 pitting BLE edema. NG to LIWS. K 3.4 Wound Type: None       Current Nutrition Intake & Therapies:    Average Meal Intake: NPO  Average Supplements Intake: NPO  Diet NPO    Anthropometric Measures:  Height: 157.5 cm (5' 2\")  Ideal Body Weight (IBW): 110 lbs (50 kg)       Current Body Weight: 72.6 kg (160 lb),   IBW. Weight Source: Other (Comment) (Estimated)  Current BMI (kg/m2): 29.3                          BMI Categories: Overweight (BMI 25.0-29.9)    Estimated Daily Nutrient Needs:  Energy Requirements Based On: Kcal/kg  Weight Used for Energy Requirements: Ideal  Energy (kcal/day): 7379-3802  Weight Used for Protein Requirements: Ideal  Protein (g/day): 50-60  Method Used for Fluid Requirements: 1 ml/kcal  Fluid (ml/day): 7944-0645    Nutrition Diagnosis:   Inadequate oral intake related to altered GI function as evidenced by NPO or clear liquid status due to medical condition    Nutrition Interventions: 
NGT removed per order. Patient tolerated well. No needs at this time.  
Occupational Therapy  Facility/Department: [facility] A2 CARD TELEMETRY  Occupational Therapy Initial Assessment    Name: Vj Gold  : 1927  MRN: [id number]  Date of Service: 2024    Discharge Recommendations:  24 hour supervision or assist, Home with Home health OT  OT Equipment Recommendations  Equipment Needed: No       Patient Diagnosis(es): The primary encounter diagnosis was Dyspnea and respiratory abnormalities. Diagnoses of Nausea, NSTEMI (non-ST elevated myocardial infarction) ([facility]), Acute on chronic congestive heart failure, unspecified heart failure type ([facility]), SBO (small bowel obstruction) ([facility]), and Hiatal hernia were also pertinent to this visit.  Past Medical History:  has a past medical history of Arthritis, Arthritis, CAD (coronary artery disease), GERD (gastroesophageal reflux disease), Hyperlipidemia, Hypertension, and Thyroid disease.  Past Surgical History:  has a past surgical history that includes joint replacement; Cholecystectomy; Coronary angioplasty with stent; pr esophagogastroduodenoscopy transoral diagnostic (N/A, 2018); and Upper gastrointestinal endoscopy (N/A, 2022).       Therapy discharge recommendations are subject to collaboration from the patient’s interdisciplinary healthcare team, including MD and case management recommendations.    If pt is unable to be seen after this session, please let this note serve as discharge summary.  Please see case management note for discharge disposition.  Thank you.      Assessment   Performance deficits / Impairments: Decreased functional mobility ;Decreased safe awareness;Decreased balance;Decreased coordination;Decreased ADL status;Decreased cognition;Decreased high-level IADLs;Decreased endurance;Decreased strength;Decreased ROM;Decreased posture  Assessment: Pt presents to [facility] with NSTEMI, hiatal hernia. Pt reports resides at home with 24hr aid/care, requires assistance for most ADLs, uses bilateral crutches at baseline 
Occupational Therapy  Facility/Department: [facility] A2 CARD TELEMETRY  Daily Treatment Note  NAME: Vj Gold  : 1927  MRN: [id number]    Date of Service: 2024    Discharge Recommendations:  24 hour supervision or assist, Home with Home health OT     Patient Diagnosis(es): The primary encounter diagnosis was Dyspnea and respiratory abnormalities. Diagnoses of Nausea, NSTEMI (non-ST elevated myocardial infarction) ([facility]), Acute on chronic congestive heart failure, unspecified heart failure type (HCC), SBO (small bowel obstruction) ([facility]), Hiatal hernia, and Acute coronary syndrome ([facility]) were also pertinent to this visit.     Assessment    Assessment: Pt seen for OT tx. She requires total assist LE ADLs including R AFO, socks and shoes. Modx2 needed for transfer bed to chair with crutches today. She had been min x2 yesterday for transfer.  Pt required increased assistance for standing balance and to take steps to chair with crutches.. O2 sats were 93-95% on RA during session. She plans to return home and has  assist.  Cont OT.  Activity Tolerance: Patient tolerated treatment well  Discharge Recommendations: 24 hour supervision or assist;Home with Home health OT      Plan   Occupational Therapy Plan  Times Per Week: 2-3x  Current Treatment Recommendations: Strengthening;ROM;Patient/Caregiver education & training;Self-Care / ADL;Cognitive reorientation;Functional mobility training;Endurance training;Equipment evaluation, education, & procurement;Safety education & training;Home management training     Restrictions  Restrictions/Precautions  Restrictions/Precautions: Up as Tolerated;General Precautions;Fall Risk  Required Braces or Orthoses  Right Lower Extremity Brace: Ankle Foot Orthotics  Position Activity Restriction  Other position/activity restrictions: IZAIAH, tasha    Subjective   Subjective  Subjective: Pt agreeable to OT  Pain: Denies pain  Orientation  Overall Orientation Status: Within Functional 
Occupational Therapy  Facility/Department: [facility] A2 CARD TELEMETRY  Daily Treatment Note  NAME: Vj Gold  : 1927  MRN: [id number]    Date of Service: 2024    Discharge Recommendations:  24 hour supervision or assist, Home with Home health OT     Patient Diagnosis(es): The primary encounter diagnosis was Dyspnea and respiratory abnormalities. Diagnoses of Nausea, NSTEMI (non-ST elevated myocardial infarction) (HCC), Acute on chronic congestive heart failure, unspecified heart failure type (HCC), SBO (small bowel obstruction) ([facility]), Hiatal hernia, and Acute coronary syndrome ([facility]) were also pertinent to this visit.     Assessment    Assessment: Pt seen for OT tx. She requires total assist LE ADLs including R AFO, socks, shoes and brief. Min x2 needed for transfer bed to chair with crutches. pt requires assist for balance and to place crutches.  She plans to return home and has 24/7 assist.  Cont OT.  Activity Tolerance: Patient tolerated treatment well  Discharge Recommendations: 24 hour supervision or assist;Home with Home health OT      Plan   Occupational Therapy Plan  Times Per Week: 2-3x  Current Treatment Recommendations: Strengthening;ROM;Patient/Caregiver education & training;Self-Care / ADL;Cognitive reorientation;Functional mobility training;Endurance training;Equipment evaluation, education, & procurement;Safety education & training;Home management training     Restrictions  Restrictions/Precautions  Restrictions/Precautions: Up as Tolerated;General Precautions;Fall Risk  Required Braces or Orthoses  Right Lower Extremity Brace: Ankle Foot Orthotics  Position Activity Restriction  Other position/activity restrictions: IV, purewick    Subjective   Subjective  Subjective: Pt agreeable to OT  Pain: Denies pain  Orientation  Overall Orientation Status: Within Functional Limits  Orientation Level: Oriented X4  Pain: No c/o pain  Cognition  Overall Cognitive Status: Exceptions  Arousal/Alertness: 
Patient admitted to room 206 from ER4.  Patient oriented to room, call light, bed rails, phone, lights and bathroom.  Patient instructed about the schedule of the day including: vital sign frequency, lab draws, possible tests, frequency of MD and staff rounds, including RN/MD rounding together at bedside, daily weights, and I &O's.  Patient instructed about prescribed diet, how to use 8MENU, and television. bed alarm in place, patient aware of placement and reason.  Telemetry box 3 in place, patient aware of placement and reason.  Bed locked, in lowest position, side rails up 2/4, call light within reach.  Will continue to monitor.     
Patient: Vj Gold MRN: [id number]  Date of  Admission: 6/8/2024   YOB: 1927  Age: 89 y.o.  Sex: female    Unit: [facility] A2 CARD TELEMETRY  Room/Bed: [facility] Admitting Physician: SHARRI WASHINGTON    Attending Physician:  Trent Choudhury MD         Pulmonary Service Note      SUBJECTIVE:    Patient has no complaints.  She denies any shortness of breath        OBJECTIVE    Medications    Continuous Infusions:   heparin (PORCINE) Infusion 550 Units/hr (06/09/24 0556)    sodium chloride         Scheduled Meds:   doxycycline (VIBRAMYCIN) IV  100 mg IntraVENous Q12H    sodium chloride flush  5-40 mL IntraVENous 2 times per day    pantoprazole  40 mg IntraVENous Daily    metoprolol  5 mg IntraVENous Q8H    levothyroxine  100 mcg IntraVENous Daily       PRN Meds:  sodium chloride flush, sodium chloride, potassium chloride **OR** potassium alternative oral replacement **OR** potassium chloride, ondansetron **OR** ondansetron, bisacodyl, aluminum & magnesium hydroxide-simethicone, acetaminophen **OR** acetaminophen, heparin (porcine), heparin (porcine), ipratropium 0.5 mg-albuterol 2.5 mg    Physical    Patient Vitals for the past 24 hrs:   BP Temp Temp src Pulse Resp SpO2 Height Weight   06/09/24 0848 (!) 137/92 -- -- 80 -- 98 % -- --   06/09/24 0400 130/70 97.7 °F (36.5 °C) -- 62 18 97 % -- --   06/09/24 0145 -- -- -- 59 -- -- -- --   06/09/24 0004 -- -- -- 58 -- -- -- --   06/08/24 2354 111/61 98.1 °F (36.7 °C) Axillary 66 17 98 % -- --   06/08/24 2117 -- -- -- -- -- 95 % -- --   06/08/24 2115 (!) 106/59 -- -- 68 18 91 % -- --   06/08/24 1845 (!) 149/82 98.1 °F (36.7 °C) Oral 87 20 96 % -- --   06/08/24 1748 (!) 153/95 -- -- 89 24 91 % -- --   06/08/24 1548 (!) 161/89 -- -- 96 21 92 % -- --   06/08/24 1324 (!) 165/104 -- -- 90 20 96 % -- --   06/08/24 1113 (!) 161/103 98.4 °F (36.9 °C) Oral 96 16 92 % 1.575 m (5' 2\") 72.6 kg (160 lb)        Physical Exam  Constitutional:       Appearance: She is 
Physical Therapy  1400Facility/Department: [place] A2 CARD TELEMETRY  Daily Treatment Note  NAME: Vj Gold  : 1927  MRN: [id number]    Date of Service: 2024    Discharge Recommendations:  24 hour supervision or assist, Home with Home health PT   PT Equipment Recommendations  Equipment Needed: No  Other: pt has recommended RW and her preferred crutches at home    Patient Diagnosis(es): The primary encounter diagnosis was Dyspnea and respiratory abnormalities. Diagnoses of Nausea, NSTEMI (non-ST elevated myocardial infarction) ([facility]), Acute on chronic congestive heart failure, unspecified heart failure type ([facility]), SBO (small bowel obstruction) ([facility]), Hiatal hernia, and Acute coronary syndrome ([facility]) were also pertinent to this visit.    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, pt reports no pain with weight bearing, pt progressing as evidenced by slightly increased gait distance to 10ft from bed to chair, this is max distance pt is able tolerate due to fatigue, pt mentioned feeling weaker from lying in bed in hospital, pt typically motivated and states \"let me do it myself\" however this session pt requested increased assist to acheive full stand at crutches, \"you'll have to pull me forward a little\", pt required increased assist to stand and place crutches under axilla versus yesterday, pt will benefit from continued Acute Inpatient Skilled PT to address functional mobility deficits, cont to rec HHPT  Activity Tolerance: Patient tolerated treatment well;Patient limited by fatigue  Equipment Needed: No  Other: pt has recommended RW and her preferred crutches at home     Plan    Physical Therapy Plan  General Plan: 3-5 times per week  Current Treatment 
Physical Therapy  Facility/Department: [facility] A2 CARD TELEMETRY  Daily Treatment Note  NAME: Vj Gold  : 1927  MRN: [id number]    Date of Service: 2024    Discharge Recommendations:  24 hour supervision or assist, Home with Home health PT   PT Equipment Recommendations  Equipment Needed: No  Other: pt has recommended RW and her preferred crutches at home    Patient Diagnosis(es): The primary encounter diagnosis was Dyspnea and respiratory abnormalities. Diagnoses of Nausea, NSTEMI (non-ST elevated myocardial infarction) ([facility]), Acute on chronic congestive heart failure, unspecified heart failure type ([facility]), SBO (small bowel obstruction) ([facility]), Hiatal hernia, and Acute coronary syndrome ([facility]) were also pertinent to this visit.    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, pt reports no pain with weight bearing, pt tolerated 6ft with Min A of 2 with crutches, pt states typically able to ambulate further however limited by fatigue this session, pt will benefit from continued Acute Inpatient Skilled PT to address functional mobility deficits, cont to rec HHPT  Activity Tolerance: Patient tolerated treatment well;Patient limited by fatigue  Equipment Needed: No  Other: pt has recommended RW and her preferred crutches at home     Plan    Physical Therapy Plan  General Plan: 3-5 times per week  Current Treatment Recommendations: Strengthening;Balance training;Functional mobility training;Transfer training;Gait training;Home exercise program;Safety education & training;Wheelchair mobility training;Neuromuscular re-education;Therapeutic activities;Co-Treatment     Restrictions  Restrictions/Precautions  Restrictions/Precautions: Up as Tolerated, General 
Physical Therapy  Facility/Department: [facility] A2 CARD TELEMETRY  Daily Treatment Note  NAME: Vj Gold  : 1927  MRN: [id number]    Date of Service: 6/10/2024    Discharge Recommendations:  24 hour supervision or assist, Home with Home health PT   PT Equipment Recommendations  Equipment Needed: No  Other: pt has recommended RW and her preferred crutches at home    Patient Diagnosis(es): The primary encounter diagnosis was Dyspnea and respiratory abnormalities. Diagnoses of Nausea, NSTEMI (non-ST elevated myocardial infarction) ([facility]), Acute on chronic congestive heart failure, unspecified heart failure type ([facility]), SBO (small bowel obstruction) ([facility]), Hiatal hernia, and Acute coronary syndrome ([facility]) were also pertinent to this visit.    Assessment   Assessment: Pt seen for PT tx this am; agreeable to participate however declines OOB activity due to pain levels. Pt making fair progress toward goals as she is able to complete bed mobility with CGA and tolerate sitting EOB x4 min with CGA-SBA. Pt toleates supine ther ex this session with increased rest breaks. Continue to anticipate pt will be safe to dc home with initial 24 hr assist and HHPT once medically appropriate.  Activity Tolerance: Patient limited by pain;Patient limited by fatigue;Patient tolerated treatment well  Equipment Needed: No  Other: pt has recommended RW and her preferred crutches at home     Plan    Physical Therapy Plan  General Plan: 3-5 times per week  Current Treatment Recommendations: Strengthening;Balance training;Functional mobility training;Transfer training;Gait training;Home exercise program;Safety education & training;Wheelchair mobility training;Neuromuscular re-education;Therapeutic activities;Co-Treatment     Restrictions  Restrictions/Precautions  Restrictions/Precautions: Up as Tolerated, General Precautions  Required Braces or Orthoses?: Yes  Required Braces or Orthoses  Right Lower Extremity Brace: Ankle Foot 
Physical Therapy  Facility/Department: [facility] A2 CARD TELEMETRY  Physical Therapy Initial Assessment & Treatment     Name: Vj Gold  : 1927  MRN: [id number]  Date of Service: 2024    Discharge Recommendations:  24 hour supervision or assist, Home with Home health PT   PT Equipment Recommendations  Equipment Needed: No  Other: pt has recommended RW and her preferred crutches at home      Patient Diagnosis(es): The primary encounter diagnosis was Dyspnea and respiratory abnormalities. Diagnoses of Nausea, NSTEMI (non-ST elevated myocardial infarction) (HCC), Acute on chronic congestive heart failure, unspecified heart failure type (HCC), SBO (small bowel obstruction) (HCC), Hiatal hernia, and Acute coronary syndrome (HCC) were also pertinent to this visit.  Past Medical History:  has a past medical history of Arthritis, Arthritis, CAD (coronary artery disease), GERD (gastroesophageal reflux disease), Hyperlipidemia, Hypertension, and Thyroid disease.  Past Surgical History:  has a past surgical history that includes joint replacement; Cholecystectomy; Coronary angioplasty with stent; pr esophagogastroduodenoscopy transoral diagnostic (N/A, 2018); and Upper gastrointestinal endoscopy (N/A, 2022).    Assessment   Body Structures, Functions, Activity Limitations Requiring Skilled Therapeutic Intervention: Decreased functional mobility ;Decreased ROM;Decreased safe awareness;Decreased body mechanics;Decreased endurance;Decreased posture;Decreased balance;Decreased strength;Decreased coordination  Assessment: Pt is awake and agreeable to therapy session. Pt is limited by global weakness, postural deficits, and decreased activity tolerance. She requires minimal assistance with <5ft ambulation to chair using crutches and for pregait activity using RW. Therapist educated at length on benefits of RW compared to crutches, but patient prefers to continue with use of crutches. Recommend continued home PT 
Pt got up to the chair with PT and did some movements in the bed.  O2 sat was 94% during this on RA.  
Pt tolerating regular diet well.  Will cont to monitor  
complication. 4.  Intra/extrahepatic biliary ductal dilatation appears greater than expected following cholecystectomy and may relate to bowel obstruction. Nonemergent right upper quadrant ultrasound recommended following resolution of patient's acute illness. 5.  Trace bilateral pleural fluid with small left lower lobe consolidation, possibly pneumonia in the appropriate clinical setting.  Imaging follow-up recommended to document resolution. 6.  Focal enlargement of the lower right psoas muscle near the musculotendinous junction.  This may reflect chronic hematoma or bursitis though is incompletely characterized.  Nonemergent MR follow-up may be helpful as clinically warranted. 7.  Additional findings, as above.     CT CHEST PULMONARY EMBOLISM W CONTRAST    Result Date: 6/8/2024  EXAMINATION: CTA OF THE CHEST 6/8/2024 2:06 pm TECHNIQUE: CTA of the chest was performed after the administration of intravenous contrast.  Multiplanar reformatted images are provided for review.  MIP images are provided for review. Automated exposure control, iterative reconstruction, and/or weight based adjustment of the mA/kV was utilized to reduce the radiation dose to as low as reasonably achievable. COMPARISON: None. HISTORY: Hypoxia, short of breath Reason for Exam: Chest pain/abd pain since last night with h/o DVT FINDINGS: Pulmonary Arteries: Pulmonary arteries are adequately opacified for evaluation.  No evidence of intraluminal filling defect to suggest pulmonary embolism.  Main pulmonary artery is normal in caliber, 22 mm. Mediastinum: No evidence of mediastinal lymphadenopathy.  Moderate calcific atherosclerosis coronary arteries.  The heart is enlarged.  There is no acute abnormality of the thoracic aorta.  The ascending thoracic aorta 34 mm, descending thoracic aorta 25 mm.  Very large hiatal hernia. Lungs/pleura: No pneumothorax.  Trace pleural effusion bilateral with minimal bibasilar consolidation.  No inspissated 
CTA and/pelvis performed at the same time. Soft Tissues/Bones: No acute bone or soft tissue abnormality.     1.  No acute pulmonary embolism. 2. Nonspecific small volume pleural effusion bilateral with associated bibasilar relaxation atelectasis or infiltrate such as pneumonia. 3. Very large hiatal hernia.  Dilated stomach, correlate with CT abdomen/pelvis report. 4. Moderate calcific atherosclerosis coronary arteries. 5. Cardiomegaly.     XR CHEST PORTABLE    Result Date: 6/8/2024  EXAMINATION: ONE XRAY VIEW OF THE CHEST 6/8/2024 11:31 am COMPARISON: None. HISTORY: ORDERING SYSTEM PROVIDED HISTORY: sob TECHNOLOGIST PROVIDED HISTORY: Reason for exam:->sob Reason for Exam: SOB. unable to take in a deep breath for the xray. FINDINGS: Medical devices: None. Mediastinum/Heart: Large hiatal hernia.  Cardiac contours are not well visualized.  The mediastinal contours are otherwise unremarkable. Lungs: Streaky opacities in the superior right lung, favored to represent scarring.  Opacifications the retrocardiac left lung and patchy opacities in the left lung base, atelectasis versus pneumonia. Pleura: No pleural effusion. No pneumothorax.     1. Opacifications the retrocardiac left lung and patchy opacities in the left lung base, atelectasis versus pneumonia. 2.  Large hiatal hernia.       CBC:   Recent Labs     06/08/24  1919 06/09/24  0323 06/10/24  0216   WBC 11.6* 8.7 6.5   HGB 13.5 12.5 11.9*    170 158     BMP:    Recent Labs     06/08/24  1131 06/09/24  0323 06/10/24  0216   * 133* 139   K 4.6 3.5 3.4*   CL 93* 94* 100   CO2 29 29 27   BUN 12 18 17   CREATININE <0.5* <0.5* <0.5*   GLUCOSE 139* 102* 75     Hepatic:   Recent Labs     06/08/24  1131 06/09/24  0323 06/10/24  0216   AST 35 32 31   ALT 19 15 16   BILITOT 0.5 0.5 0.4   ALKPHOS 96 71 66     Lipids:   Lab Results   Component Value Date/Time    CHOL 182 06/16/2020 12:03 PM    HDL 61 06/16/2020 12:03 PM    TRIG 83 06/16/2020 12:03 PM     Hemoglobin 
arteries. 5. Cardiomegaly.     XR CHEST PORTABLE    Result Date: 6/8/2024  EXAMINATION: ONE XRAY VIEW OF THE CHEST 6/8/2024 11:31 am COMPARISON: None. HISTORY: ORDERING SYSTEM PROVIDED HISTORY: sob TECHNOLOGIST PROVIDED HISTORY: Reason for exam:->sob Reason for Exam: SOB. unable to take in a deep breath for the xray. FINDINGS: Medical devices: None. Mediastinum/Heart: Large hiatal hernia.  Cardiac contours are not well visualized.  The mediastinal contours are otherwise unremarkable. Lungs: Streaky opacities in the superior right lung, favored to represent scarring.  Opacifications the retrocardiac left lung and patchy opacities in the left lung base, atelectasis versus pneumonia. Pleura: No pleural effusion. No pneumothorax.     1. Opacifications the retrocardiac left lung and patchy opacities in the left lung base, atelectasis versus pneumonia. 2.  Large hiatal hernia.       CBC:   Recent Labs     06/08/24  1131 06/08/24  1919 06/09/24  0323   WBC 10.0 11.6* 8.7   HGB 14.4 13.5 12.5    180 170     BMP:    Recent Labs     06/08/24  1131 06/09/24  0323   * 133*   K 4.6 3.5   CL 93* 94*   CO2 29 29   BUN 12 18   CREATININE <0.5* <0.5*   GLUCOSE 139* 102*     Hepatic:   Recent Labs     06/08/24  1131 06/09/24  0323   AST 35 32   ALT 19 15   BILITOT 0.5 0.5   ALKPHOS 96 71     Lipids:   Lab Results   Component Value Date/Time    CHOL 182 06/16/2020 12:03 PM    HDL 61 06/16/2020 12:03 PM    TRIG 83 06/16/2020 12:03 PM     Hemoglobin A1C: No results found for: \"LABA1C\"  TSH:   Lab Results   Component Value Date/Time    TSH 2.53 05/19/2021 02:36 PM     Troponin: No results found for: \"TROPONINT\"  Lactic Acid: No results for input(s): \"LACTA\" in the last 72 hours.  BNP:   Recent Labs     06/08/24  1131   PROBNP 2,366*     UA:  Lab Results   Component Value Date/Time    NITRU POSITIVE 06/08/2024 01:38 PM    COLORU Yellow 06/08/2024 01:38 PM    PHUR 7.0 06/08/2024 01:38 PM    PHUR 6.0 05/28/2019 03:20 PM    
opacities in the left lung base, atelectasis versus pneumonia. Pleura: No pleural effusion. No pneumothorax.     1. Opacifications the retrocardiac left lung and patchy opacities in the left lung base, atelectasis versus pneumonia. 2.  Large hiatal hernia.       CBC:   Recent Labs     06/09/24 0323 06/10/24  0216 06/11/24  0445   WBC 8.7 6.5 5.6   HGB 12.5 11.9* 11.7*    158 164     BMP:    Recent Labs     06/09/24  0323 06/10/24  0216 06/11/24  0445   * 139 138   K 3.5 3.4* 3.9   CL 94* 100 105   CO2 29 27 24   BUN 18 17 14   CREATININE <0.5* <0.5* <0.5*   GLUCOSE 102* 75 87     Hepatic:   Recent Labs     06/09/24  0323 06/10/24  0216 06/11/24  0445   AST 32 31 25   ALT 15 16 15   BILITOT 0.5 0.4 0.4   ALKPHOS 71 66 70     Lipids:   Lab Results   Component Value Date/Time    CHOL 182 06/16/2020 12:03 PM    HDL 61 06/16/2020 12:03 PM    TRIG 83 06/16/2020 12:03 PM     Hemoglobin A1C: No results found for: \"LABA1C\"  TSH:   Lab Results   Component Value Date/Time    TSH 2.53 05/19/2021 02:36 PM     Troponin: No results found for: \"TROPONINT\"  Lactic Acid: No results for input(s): \"LACTA\" in the last 72 hours.  BNP:   Recent Labs     06/08/24  1131   PROBNP 2,366*     UA:  Lab Results   Component Value Date/Time    NITRU POSITIVE 06/08/2024 01:38 PM    COLORU Yellow 06/08/2024 01:38 PM    PHUR 7.0 06/08/2024 01:38 PM    PHUR 6.0 05/28/2019 03:20 PM    WBCUA 6-9 06/08/2024 01:38 PM    RBCUA None seen 06/08/2024 01:38 PM    BACTERIA 4+ 06/08/2024 01:38 PM    CLARITYU Clear 06/08/2024 01:38 PM    LEUKOCYTESUR SMALL 06/08/2024 01:38 PM    UROBILINOGEN 0.2 06/08/2024 01:38 PM    BILIRUBINUR Negative 06/08/2024 01:38 PM    BLOODU Negative 06/08/2024 01:38 PM    GLUCOSEU Negative 06/08/2024 01:38 PM    KETUA Negative 06/08/2024 01:38 PM    AMORPHOUS Rare 05/28/2019 03:20 PM     Urine Cultures:   Lab Results   Component Value Date/Time    LABURIN >100,000 CFU/ml 05/28/2019 03:20 PM     Blood Cultures:   Lab

## 2024-06-12 NOTE — PLAN OF CARE
Problem: Discharge Planning  Goal: Discharge to home or other facility with appropriate resources  Outcome: Progressing     Problem: Safety - Adult  Goal: Free from fall injury  Outcome: Progressing  Note: Pt will remain free from falls throughout hospital stay. Fall precautions in place, bed alarm on, bed in lowest position with wheels locked and side rails 2/4 up. Room door open and hourly rounding completed. Will continue to monitor throughout shift.      Problem: Skin/Tissue Integrity  Goal: Absence of new skin breakdown  Description: 1.  Monitor for areas of redness and/or skin breakdown  2.  Assess vascular access sites hourly  3.  Every 4-6 hours minimum:  Change oxygen saturation probe site  4.  Every 4-6 hours:  If on nasal continuous positive airway pressure, respiratory therapy assess nares and determine need for appliance change or resting period.  Outcome: Progressing     Problem: ABCDS Injury Assessment  Goal: Absence of physical injury  Outcome: Progressing     Problem: Nutrition Deficit:  Goal: Optimize nutritional status  Outcome: Progressing     Problem: Pain  Goal: Verbalizes/displays adequate comfort level or baseline comfort level  Outcome: Progressing  Note: Pt will be satisfied with pain control. Pt uses numeric pain rating scale with reassessments after pain med administration. Will continue to monitor progression throughout shift.

## 2024-06-12 NOTE — CARE COORDINATION
CASE MANAGEMENT DISCHARGE SUMMARY      Discharge to: home with [facility]       IMM given: (date) 6/12/24   Follow-Up copy of Important Message from Medicare (IMM2) has been explained to patient and/or designated healthcare decision maker (HCDM). Pt and/or HCDM aware that patient is permitted to stay an additional 4 hours prior to discharge to consider an appeal if they feel as though they are being discharged too soon. Patient may discharge as planned if chooses to do so.  Patient/HCDM voice no other concerns or questions regarding this process.      Transportation: ambulance   Medical Transport explained to pt/family. Pt/family voice no agency preference.    Agency used:[facility]   time:7:30pm    Ambulance form completed: Yes    Confirmed discharge plan with:patient/family/RN/Sarah with [facility]      Patient: yes     Family:  yes     RN, name: Reg    Note: Discharging nurse to complete TYESHA, reconcile AVS, and place final copy with patient's discharge packet.   
AM-PAC: 10 /24    Family can provide assistance at DC: Yes  Would you like Case Management to discuss the discharge plan with any other family members/significant others, and if so, who? Yes  Plans to Return to Present Housing: Yes  Potential Assistance needed at discharge: Home Care            Potential DME:    Patient expects to discharge to: House  Plan for transportation at discharge:      Financial    Payor: [employer] MEDICARE / Plan: [employer] MEDICARE-ADVANTAGE PPO / Product Type: Medicare /     Does insurance require precert for SNF: Yes    Potential assistance Purchasing Medications: No  Meds-to-Beds request: Yes      [id number] - [place], [place] - [address] [phone] - F [phone]  [address] [address]  Phone: [phone] Fax: [phone]      Notes:    Factors facilitating achievement of predicted outcomes: Family support, Caregiver support, Motivated, Cooperative, and Pleasant      Additional Case Management Notes: Spoke with patient's daughter. Patient lives at home and has a private duty caregiver 24/7.  Discussed therapy rec's of home care and daughter is very much in agreement with this plan and has no preference in home care agency.  Placed call to Sarah with [facility] and notified of referral.  She states they are able to follow at discharge for home care needs.         IF APPLICABLE: The Patient and/or patient representative Vj and her family were provided with a choice of provider and agrees with the discharge plan. Freedom of choice list with basic dialogue that supports the patient's individualized plan of care/goals and shares the quality data associated with the providers was provided to:     Patient Representative Name:       The Patient and/or Patient Representative Agree with the Discharge Plan?      Kaylynn Anders RN  Case Management Department  Ph: [phone]

## 2024-06-12 NOTE — DISCHARGE SUMMARY
ABDOMEN (KUB) (SINGLE AP VIEW)    Result Date: 6/8/2024  EXAMINATION: ONE SUPINE XRAY VIEW(S) OF THE ABDOMEN 6/8/2024 5:40 pm COMPARISON: None. HISTORY: ORDERING SYSTEM PROVIDED HISTORY: ng TECHNOLOGIST PROVIDED HISTORY: Reason for exam:->ng Reason for Exam: ng FINDINGS: There is a nonobstructive bowel gas pattern.  There is no free air.  There are no suspicious calcifications.  There is a gastric tube with the tip in the proximal gastric body.  There is no acute osseous abnormality.  The surrounding soft tissues are unremarkable.     Gastric tube with the tip in the proximal gastric body.     CT ABDOMEN PELVIS W IV CONTRAST Additional Contrast? None    Result Date: 6/8/2024  EXAMINATION: CT OF THE ABDOMEN AND PELVIS WITH CONTRAST 6/8/2024 2:06 pm TECHNIQUE: CT of the abdomen and pelvis was performed with the administration of intravenous contrast. Multiplanar reformatted images are provided for review. Automated exposure control, iterative reconstruction, and/or weight based adjustment of the mA/kV was utilized to reduce the radiation dose to as low as reasonably achievable. COMPARISON: None. HISTORY: ORDERING SYSTEM PROVIDED HISTORY: abd pain TECHNOLOGIST PROVIDED HISTORY: Reason for exam:->abd pain Additional Contrast?->None Decision Support Exception - unselect if not a suspected or confirmed emergency medical condition->Emergency Medical Condition (MA) Reason for Exam: Chest pain/abd pain since last night with h/o DVT FINDINGS: Lower Chest:   Cardiomegaly.  Large hiatal hernia with mid to distal esophagitis.  Minimal bibasilar dependent atelectasis with small superimposed left lower lobe consolidation demonstrating air bronchograms.  Trace bilateral pleural fluid, left greater than right. Organs:   Mild right hydronephrosis and proximal hydroureter of uncertain etiology.  Subcentimeter parenchymal lesions are too small to fully characterize.  Small fat attenuation superior right renal lesion measuring 1.6 cm in

## 2024-06-12 NOTE — PLAN OF CARE
Problem: Discharge Planning  Goal: Discharge to home or other facility with appropriate resources  6/12/2024 1052 by Reg Torres, RN  Outcome: Progressing     Problem: Safety - Adult  Goal: Free from fall injury  6/12/2024 1052 by Reg Torres, RN  Outcome: Progressing     Problem: Skin/Tissue Integrity  Goal: Absence of new skin breakdown  Description: 1.  Monitor for areas of redness and/or skin breakdown  2.  Assess vascular access sites hourly  3.  Every 4-6 hours minimum:  Change oxygen saturation probe site  4.  Every 4-6 hours:  If on nasal continuous positive airway pressure, respiratory therapy assess nares and determine need for appliance change or resting period.  6/12/2024 1052 by Reg Torres, RN  Outcome: Progressing     Problem: ABCDS Injury Assessment  Goal: Absence of physical injury  6/12/2024 1052 by Reg Torres, RN  Outcome: Progressing     Problem: Nutrition Deficit:  Goal: Optimize nutritional status  6/12/2024 1052 by Reg Torres, RN  Outcome: Progressing

## 2024-06-12 NOTE — PLAN OF CARE
Problem: Discharge Planning  Goal: Discharge to home or other facility with appropriate resources  6/12/2024 1458 by Reg Torres, RN  Outcome: Adequate for Discharge     Problem: Safety - Adult  Goal: Free from fall injury  6/12/2024 1458 by Reg Torres, RN  Outcome: Adequate for Discharge     Problem: Skin/Tissue Integrity  Goal: Absence of new skin breakdown  Description: 1.  Monitor for areas of redness and/or skin breakdown  2.  Assess vascular access sites hourly  3.  Every 4-6 hours minimum:  Change oxygen saturation probe site  4.  Every 4-6 hours:  If on nasal continuous positive airway pressure, respiratory therapy assess nares and determine need for appliance change or resting period.  6/12/2024 1458 by Reg Torres, RN  Outcome: Adequate for Discharge     Problem: ABCDS Injury Assessment  Goal: Absence of physical injury  6/12/2024 1458 by Reg Torres, RN  Outcome: Adequate for Discharge     Problem: Nutrition Deficit:  Goal: Optimize nutritional status  6/12/2024 1458 by Reg Torres, RN  Outcome: Adequate for Discharge

## 2024-06-12 NOTE — DISCHARGE INSTR - COC
Continuity of Care Form    Patient Name: Vj Gold   :  1927  MRN:  [id number]    Admit date:  2024  Discharge date:  ***    Code Status Order: DNR-CCA   Advance Directives:     Admitting Physician:  Gregory Smith MD  PCP: Kimo Conklin MD    Discharging Nurse: ***  Discharging Hospital Unit/Room#: 0206/0206-01  Discharging Unit Phone Number: ***    Emergency Contact:   Extended Emergency Contact Information  Primary Emergency Contact: Daniel Gold  Address: [address] [place] [address] of [facility]  Home Phone: [phone]  Mobile Phone: [phone]  Relation: Spouse  Secondary Emergency Contact: Esme García  Home Phone: [phone]  Mobile Phone: [phone]  Relation: Child    Past Surgical History:  Past Surgical History:   Procedure Laterality Date    CHOLECYSTECTOMY      CORONARY ANGIOPLASTY WITH STENT PLACEMENT      JOINT REPLACEMENT      \"knees and hips\"    NE ESOPHAGOGASTRODUODENOSCOPY TRANSORAL DIAGNOSTIC N/A 2018    EGD ESOPHAGOGASTRODUODENOSCOPY performed by Cezar Tovar MD at [facility] ENDOSCOPY    UPPER GASTROINTESTINAL ENDOSCOPY N/A 2022    EGD BIOPSY performed by Robel Paredes DO at [facility] ENDOSCOPY       Immunization History:   Immunization History   Administered Date(s) Administered    COVID-19, MODERNA BLUE border, Primary or Immunocompromised, (age 12y+), IM, 100 mcg/0.5mL 2021, 2021, 10/14/2021, 2022    COVID-19, MODERNA Bivalent, (age 12y+), IM, 50 mcg/0.5 mL 2022, 2023    Influenza Vaccine, unspecified formulation 10/28/2015    Influenza Virus Vaccine 2012, 10/24/2013, 10/28/2015, 2016    Influenza Whole 10/14/2017    Influenza, FLUARIX, FLULAVAL, FLUZONE (age 6 mo+) AND AFLURIA, (age 3 y+), PF, 0.5mL 2019, 10/08/2020    Influenza, FLUZONE (age 65 y+), High Dose, 0.7mL 10/13/2021    Pneumococcal, PCV-13, PREVNAR 13, (age 6w+), IM, 0.5mL 2015

## 2024-06-13 ENCOUNTER — TELEPHONE (OUTPATIENT)
Dept: FAMILY MEDICINE CLINIC | Age: 89
End: 2024-06-13

## 2024-06-13 ENCOUNTER — CARE COORDINATION (OUTPATIENT)
Dept: CASE MANAGEMENT | Age: 89
End: 2024-06-13

## 2024-06-13 PROBLEM — K56.609 SMALL BOWEL OBSTRUCTION (HCC): Status: ACTIVE | Noted: 2024-06-13

## 2024-06-13 NOTE — CARE COORDINATION
Care Transitions Initial Follow Up Call    Call within 2 business days of discharge: Yes    Patient Current Location:  Home: [address] [place] [id number]    Care Transition Nurse contacted the patient by telephone to perform post hospital discharge assessment. Verified name and  with patient as identifiers. Provided introduction to self, and explanation of the Care Transition Nurse role.     Patient: Vj Gold Patient : 1927   MRN: [id number]  Reason for Admission: NSTEMI  Discharge Date: 24 RARS: Readmission Risk Score: 13.1      Last Discharge Facility       Date Complaint Diagnosis Description Type Department Provider    24 Shortness of Breath Dyspnea and respiratory abnormalities ... ED to Hosp-Admission (Discharged) (ADMITTED) Kimo Miller MD; Carmelina Alonso...            Was this an external facility discharge? No Discharge Facility:     Challenges to be reviewed by the provider   Additional needs identified to be addressed with provider: No  none         Method of communication with provider: none.    Call to sarah iglesias at [facility] and l/m with call back number and request for return call.    Patient answered call and verified . Patient pleasant and agreeable to transition call. Continues to be tired this morning, but happy to be back home. Confirmed that she has AVS and reviewing instructions at time of call. Patient stated that her  (cardiologist, Dr Daniel Gold) passed away in 2023 and has strong family support. Confirmed that she has 24/7 assistance with private duty aide. Patient aware that home care has been ordered, but has not heard from agency yet. Message left with Sarah Iglesias to confirm referral was received. Message from Sarah that patient was referred to [facility]. Call to [facility] and spoke to Bindu. Instructed that referral was received and agency is \"working on it\". SOC scheduled for

## 2024-06-13 NOTE — TELEPHONE ENCOUNTER
[facility] Transitions Initial Follow Up Call    Outreach made within 2 business days of discharge: Yes    Patient: Vj Gold Patient : 1927   MRN: [id number]  Reason for Admission: There are no discharge diagnoses documented for the most recent discharge.  Discharge Date: 24       Spoke with: Vj     Discharge department/facility: [facility]    Non-face-to-face services provided:  Scheduled appointment with PCP-   Obtained and reviewed discharge summary and/or continuity of care documents    TCM Interactive Patient Contact:  Was patient able to fill all prescriptions: Yes  Was patient instructed to bring all medications to the follow-up visit: Yes  Is patient taking all medications as directed in the discharge summary? Yes  Does patient understand their discharge instructions: Yes  Does patient have questions or concerns that need addressed prior to 7-14 day follow up office visit: no    Scheduled appointment with PCP within 7-14 days    Follow Up  Future Appointments   Date Time Provider Department Center   2024  4:00 PM Kimo Conklin MD [facility]   2024  4:00 PM Kimo Conklin MD [facility]OB[facility] RN

## 2024-06-18 ENCOUNTER — HOSPITAL ENCOUNTER (OUTPATIENT)
Age: 89
Setting detail: SPECIMEN
Discharge: HOME | End: 2024-06-18
Payer: MEDICARE

## 2024-06-18 LAB
BACTERIA URNS QL MICRO: ABNORMAL /HPF
BILIRUB UR QL STRIP.AUTO: NEGATIVE
CHARACTER UR: ABNORMAL
CLARITY UR: ABNORMAL
COLOR UR: YELLOW
EPI CELLS #/AREA URNS HPF: ABNORMAL /HPF (ref 0–5)
GLUCOSE UR STRIP.AUTO-MCNC: NEGATIVE MG/DL
HGB UR QL STRIP.AUTO: ABNORMAL
KETONES UR STRIP.AUTO-MCNC: NEGATIVE MG/DL
LEUKOCYTE ESTERASE UR QL STRIP.AUTO: ABNORMAL
NITRITE UR QL STRIP.AUTO: NEGATIVE
PH UR STRIP.AUTO: 7 [PH] (ref 5–8)
PROT UR STRIP.AUTO-MCNC: NEGATIVE MG/DL
RBC #/AREA URNS HPF: ABNORMAL /HPF (ref 0–4)
SP GR UR STRIP.AUTO: <=1.005 (ref 1–1.03)
UA COMPLETE W REFLEX CULTURE PNL UR: YES
UA DIPSTICK W REFLEX MICRO PNL UR: YES
URN SPEC COLLECT METH UR: ABNORMAL
UROBILINOGEN UR STRIP-ACNC: 0.2 E.U./DL
WBC #/AREA URNS HPF: >100 /HPF (ref 0–5)

## 2024-06-18 PROCEDURE — 81001 URINALYSIS AUTO W/SCOPE: CPT

## 2024-06-18 PROCEDURE — 87184 SC STD DISK METHOD PER PLATE: CPT

## 2024-06-18 PROCEDURE — 87086 URINE CULTURE/COLONY COUNT: CPT

## 2024-06-18 PROCEDURE — 87077 CULTURE AEROBIC IDENTIFY: CPT

## 2024-06-18 PROCEDURE — 87186 SC STD MICRODIL/AGAR DIL: CPT

## 2024-06-19 DIAGNOSIS — R35.0 URINARY FREQUENCY: ICD-10-CM

## 2024-06-19 DIAGNOSIS — N30.00 ACUTE CYSTITIS WITHOUT HEMATURIA: Primary | ICD-10-CM

## 2024-06-19 RX ORDER — SULFAMETHOXAZOLE AND TRIMETHOPRIM 800; 160 MG/1; MG/1
1 TABLET ORAL 2 TIMES DAILY
Qty: 14 TABLET | Refills: 0 | Status: SHIPPED | OUTPATIENT
Start: 2024-06-19 | End: 2024-06-20 | Stop reason: SDUPTHER

## 2024-06-20 ENCOUNTER — TELEPHONE (OUTPATIENT)
Dept: FAMILY MEDICINE CLINIC | Age: 89
End: 2024-06-20

## 2024-06-20 ENCOUNTER — CARE COORDINATION (OUTPATIENT)
Dept: CASE MANAGEMENT | Age: 89
End: 2024-06-20

## 2024-06-20 DIAGNOSIS — N30.00 ACUTE CYSTITIS WITHOUT HEMATURIA: Primary | ICD-10-CM

## 2024-06-20 RX ORDER — SOLIFENACIN SUCCINATE 5 MG/1
TABLET, FILM COATED ORAL
Qty: 90 TABLET | Refills: 0 | Status: SHIPPED | OUTPATIENT
Start: 2024-06-20

## 2024-06-20 RX ORDER — AMOXICILLIN AND CLAVULANATE POTASSIUM 500; 125 MG/1; MG/1
1 TABLET, FILM COATED ORAL 2 TIMES DAILY
Qty: 20 TABLET | Refills: 0 | Status: SHIPPED | OUTPATIENT
Start: 2024-06-20 | End: 2024-06-30

## 2024-06-20 RX ORDER — LANOLIN ALCOHOL/MO/W.PET/CERES
CREAM (GRAM) TOPICAL
Qty: 90 TABLET | Refills: 0 | Status: SHIPPED | OUTPATIENT
Start: 2024-06-20

## 2024-06-20 RX ORDER — SULFAMETHOXAZOLE AND TRIMETHOPRIM 800; 160 MG/1; MG/1
1 TABLET ORAL 2 TIMES DAILY
Qty: 14 TABLET | Refills: 0 | Status: SHIPPED | OUTPATIENT
Start: 2024-06-20 | End: 2024-06-21 | Stop reason: ALTCHOICE

## 2024-06-20 RX ORDER — BUSPIRONE HYDROCHLORIDE 5 MG/1
5 TABLET ORAL 2 TIMES DAILY PRN
Qty: 180 TABLET | Refills: 0 | Status: SHIPPED | OUTPATIENT
Start: 2024-06-20 | End: 2024-09-18

## 2024-06-20 NOTE — TELEPHONE ENCOUNTER
Refill Request     CONFIRM preferrred pharmacy with the patient.    If Mail Order Rx - Pend for 90 day refill.      Last Seen: Last Seen Department: 4/9/2024  Last Seen by PCP: 4/9/2024    Last Written: 10/12/23    If no future appointment scheduled, route STAFF MESSAGE with patient name to the  Pool for scheduling.      Next Appointment:   Future Appointments   Date Time Provider Department Center   6/25/2024  4:00 PM Kimo Conklin MD [facility] [place]   7/2/2024  4:00 PM Kimo Conklin MD [facility] [place]       Message sent to  to schedule appt with patient?  N/A      Requested Prescriptions     Pending Prescriptions Disp Refills    busPIRone (BUSPAR) 5 MG tablet [Pharmacy Med Name: BUSPIRONE HCL 5 MG TABLET] 180 tablet 0     Sig: Take 1 tablet by mouth 2 times daily as needed (anxiety)    solifenacin (VESICARE) 5 MG tablet [Pharmacy Med Name: SOLIFENACIN 5 MG TABLET] 90 tablet 0     Sig: TAKE 1 TABLET BY MOUTH EVERY DAY    magnesium oxide (MAG-OX) 400 (240 Mg) MG tablet [Pharmacy Med Name: MAGNESIUM OXIDE 400 MG TABLET] 90 tablet 0     Sig: TAKE 1 TABLET BY MOUTH EVERY DAY

## 2024-06-20 NOTE — CARE COORDINATION
Patient's daughter just call writer stating that she was calling concerning patient. She stated that the [facility] aid contacted her reporting that patient legs are swollen and that she is lethargic. Writer advise that she called 911 r/t change in condition. Daughter called writer by mistake thinking she had called the [facility] agency. Daughter ended called and stated that she will call the other number.

## 2024-06-20 NOTE — CARE COORDINATION
Care Transitions Note    Follow Up Call     Patient Current Location:  Home: [address] [place] [id number]    [facility] Care Coordinator contacted the patient by telephone. Verified name and  as identifiers.    Additional needs identified to be addressed with provider   No needs identified                 Method of communication with provider: none.    Care Summary Note: spoke with Vj Gold who reported that she was not feeling that good when she woke up this morning because of pain in her in her legs. Patient stated that as the morning went on she now does not have any pain. Patient stated that she ate breakfast and she is now sitting up in a chair looking out the window at the squirrels. Patient denied any falls and stated that she uses 2 crutches to help ambulate and has a w/c in the family room. Patient denied cp, sob, cough, dizziness, headache, n/v, diarrhea, abdominal pains, fever, or chills. Patient report that appetite and fluid intake is good and denied any problems with bowel or bladder. Patient reported that she is taking all medications as ordered. Reviewed up coming appointments with patient.  Patient denied any other needs at this time. Patient instructed to continue to monitor s/s, reporting any that may present to MD immediately for early intervention.  Patient is agreeable to f/u calls.     Plan of care updates since last contact:  none       Advance Care Planning   The patient has the following advanced directives on file:  Advance Directives       Power of  Living Will ACP-Advance Directive ACP-Power of     Not on File Not on File Not on File Not on File            The patient has appointed the following active healthcare agents:    Primary Decision Maker: Esme García - Child - [phone]        Medication Review:  No changes since last call.     Remote Patient Monitoring:  Offered patient enrollment in the Remote Patient Monitoring (RPM) program for in-home

## 2024-06-21 LAB
BACTERIA UR CULT: ABNORMAL
BACTERIA UR CULT: ABNORMAL
ORGANISM: ABNORMAL
ORGANISM: ABNORMAL

## 2024-06-25 ENCOUNTER — TELEMEDICINE (OUTPATIENT)
Dept: FAMILY MEDICINE CLINIC | Age: 89
End: 2024-06-25
Payer: MEDICARE

## 2024-06-25 DIAGNOSIS — N30.00 ACUTE CYSTITIS WITHOUT HEMATURIA: ICD-10-CM

## 2024-06-25 DIAGNOSIS — K56.609 SMALL BOWEL OBSTRUCTION (HCC): Primary | ICD-10-CM

## 2024-06-25 DIAGNOSIS — F33.0 MAJOR DEPRESSIVE DISORDER, RECURRENT, MILD (HCC): ICD-10-CM

## 2024-06-25 DIAGNOSIS — Z09 HOSPITAL DISCHARGE FOLLOW-UP: ICD-10-CM

## 2024-06-25 DIAGNOSIS — I50.22 CHRONIC SYSTOLIC (CONGESTIVE) HEART FAILURE (HCC): ICD-10-CM

## 2024-06-25 PROCEDURE — 1111F DSCHRG MED/CURRENT MED MERGE: CPT | Performed by: FAMILY MEDICINE

## 2024-06-25 PROCEDURE — 99441 PR PHYS/QHP TELEPHONE EVALUATION 5-10 MIN: CPT | Performed by: FAMILY MEDICINE

## 2024-06-25 NOTE — PROGRESS NOTES
133/66   07/26/22 128/60     No results found for: \"LABA1C\"     ASSESSMENT PLAN      Diagnosis Orders   1. Small bowel obstruction (HCC)        2. Major depressive disorder, recurrent, mild        3. Chronic systolic (congestive) heart failure        4. Acute cystitis without hematuria        Patient has recovered from the small bowel obstruction, bowels are moving actually may be having some diarrhea from the Augmentin.  Appetite is just so-so.  Nerves are doing fine.  There is no clinical evidence of coronary insufficiency or heart failure that requires any change in the treatment regimen and no changes in medications for cardiac conditions as listed in the medication list are necessary.  I did mention she had elevated troponin there was a question of a non-STEMI, but she is having no chest pain and do not think further intervention is necessary.  She had both E. coli and Proteus ESBL both growing sensitive to Augmentin.  She felt like Bactrim causes muscle weakness and is listed as an intolerance.  She is walking better with therapy.  Phone follow-up in 3 months as she is homebound.    Patient should call the office immediately with new or ongoing signs or symptoms or worsening, or proceed to the emergency room.  No changes in past medical history, past surgical history, social history, or family history were noted during the patient encounter unless specifically listed above.  All updates of past medical history, past surgical history, social history, or family history were reviewed personally by me during the office visit.  All problems listed in the assessment are stable unless noted otherwise.  Medication profile reviewed personally by me during the visit.  Medication side effects and possible impairments from medications were discussed as applicable.    Unless stated otherwise, we will continue current medications as listed in the medication review report contained in the patient's medical record.    This

## 2024-06-27 ENCOUNTER — CARE COORDINATION (OUTPATIENT)
Dept: CASE MANAGEMENT | Age: 89
End: 2024-06-27

## 2024-06-27 NOTE — CARE COORDINATION
Care Transitions Note    Follow Up Call     Patient Current Location:  Home: [address] [facility] [address]    Care Transition Nurse contacted the patient by telephone. Verified name and  as identifiers.    Additional needs identified to be addressed with provider   No needs identified         Method of communication with provider: none.    Care Summary Note: Patient answered call and verified . Patient pleasant and agreeable to transition call. Patient continues to be doing well. Continues to have some swelling in BLE, but ambulating with steady gait with assistance of crutches. Patient feels that she is getting stronger. Active with home care services for PT/OT and both therapist visited earlier this week. Physical therapy scheduled again tomorrow.   Patient had telephonic visit with PCP earlier this week and reviewed with CTN. Notes reviewed. Patient has fair appetite and denied any constipation or diarrhea. Denied any acute needs at present time.  Agreeable to f/u calls.  Educated on the use of urgent care or physician’s 24 hr access line if assistance is needed after hours.    Plan of care updates since last contact:  Home Health: continues to be active        Advance Care Planning:   Does patient have an Advance Directive: reviewed and current.    Medication Review:  Full medication reconciliation completed during previous call.    Remote Patient Monitoring:  Offered patient enrollment in the Remote Patient Monitoring (RPM) program for in-home monitoring: Yes, but did not enroll at this time: already monitoring with home equipment.    Assessments:  Care Transitions ED Follow Up    Care Transitions Interventions  Do you have all of your prescriptions and are they filled?: Yes                   Follow Up Appointment:   SOBIA appointment attended as scheduled       Care Transition Nurse provided contact information.  Plan for follow-up call in 6-10 days based on severity of symptoms and risk

## 2024-07-03 ENCOUNTER — CARE COORDINATION (OUTPATIENT)
Dept: CASE MANAGEMENT | Age: 89
End: 2024-07-03

## 2024-07-03 NOTE — CARE COORDINATION
Care Transitions Note    Follow Up Call     Patient Current Location:  Home: [address] [place] [id number]    Care Transition Nurse contacted the patient by telephone. Verified name and  as identifiers.    Additional needs identified to be addressed with provider   No needs identified                 Method of communication with provider: none.    Care Summary Note: Patient answered call and verified . Patient pleasant and agreeable to transition call. \"Doing good\" since last contact. Continues to be active with home care services and private pay aide services. Using crutches with ambulation and denied any falls.   Appetite is good and staying hydrated. No problems with bladder or bowels. Denied any pain or any acute needs at present time.  Agreeable to f/u calls.  Educated on the use of urgent care or physician’s 24 hr access line if assistance is needed after hours.    Plan of care updates since last contact:  Home Health: continues to be active        Advance Care Planning:   Does patient have an Advance Directive: reviewed during previous call, see note. .    Medication Review:  Medications changed since last call, reviewed today.     Remote Patient Monitoring:  Offered patient enrollment in the Remote Patient Monitoring (RPM) program for in-home monitoring: Yes, but did not enroll at this time: already monitoring with home equipment.    Assessments:  Care Transitions Subsequent and Final Call    Subsequent and Final Calls  Care Transitions Interventions  Other Interventions:              Follow Up Appointment:   SOBIA appointment attended as scheduled       Care Transition Nurse provided contact information.  Plan for follow-up call in 6-10 days based on severity of symptoms and risk factors.  Plan for next call: self management-close if stable      Cecilia Lynch RN

## 2024-07-09 DIAGNOSIS — R23.4 CRUSTING OF SKIN OF EYELID: ICD-10-CM

## 2024-07-10 PROBLEM — R79.89 ELEVATED TROPONIN: Status: RESOLVED | Noted: 2024-06-10 | Resolved: 2024-07-10

## 2024-07-10 RX ORDER — ERYTHROMYCIN 5 MG/G
OINTMENT OPHTHALMIC
Qty: 3.5 G | Refills: 0 | Status: SHIPPED | OUTPATIENT
Start: 2024-07-10

## 2024-07-10 NOTE — TELEPHONE ENCOUNTER
Refill Request     CONFIRM preferrred pharmacy with the patient.    If Mail Order Rx - Pend for 90 day refill.      Last Seen: Last Seen Department: 6/25/2024  Last Seen by PCP: 6/25/2024    Last Written:     If no future appointment scheduled, route STAFF MESSAGE with patient name to the  Pool for scheduling.      Next Appointment:   No future appointments.    Message sent to  to schedule appt with patient?  N/A      Requested Prescriptions     Pending Prescriptions Disp Refills    erythromycin (ROMYCIN) 5 MG/GM ophthalmic ointment [Pharmacy Med Name: ERYTHROMYCIN 0.5% EYE OINTMENT] 3.5 g 0     Sig: APPLY THIN RIBBON OF OINTMENT TO THE AFFECTED EYE TWICE DAILY FOR A WEEK

## 2024-07-11 ENCOUNTER — CARE COORDINATION (OUTPATIENT)
Dept: CASE MANAGEMENT | Age: 89
End: 2024-07-11

## 2024-07-11 NOTE — CARE COORDINATION
Care Transitions Note    Final Call     Patient Current Location:  Home: [address] [facility] [address]    N Care Coordinator contacted the patient by telephone. Verified name and  as identifiers.    Patient graduated from the Care Transitions program on 2024.  Patient/family progressing towards self management. .      Advance Care Planning:   Does patient have an Advance Directive: not on file; education provided -   .    Handoff:   Patient/family agreeable to [facility] outreach.      Care Summary Note:   Patient stated she is feeling well.she uses crutches for  mobility.  Appetite and fluid intake is fair. Patient denies swelling, cp,nvd, lh, ha dizziness, falls or cough. Gets sob when she walks a distance. Taking medication as prescribed. Patient informed this is the final call. Any medical concerns contact PCP. Expect a call from [facility] in a few days.     Assessments:  Care Transitions Subsequent and Final Call    Subsequent and Final Calls  Do you have any ongoing symptoms?: No  Have your medications changed?: No  Do you have any questions related to your medications?: No  Do you currently have any active services?: Yes  Are you currently active with any services?: Home Health  Do you have any needs or concerns that I can assist you with?: No  Identified Barriers: None  Care Transitions Interventions  Other Interventions:              Upcoming Appointments:        Patient has agreed to contact primary care provider and/or specialist for any further questions, concerns, or needs.    Neelam Newman LPN

## 2024-07-18 ENCOUNTER — CARE COORDINATION (OUTPATIENT)
Dept: CARE COORDINATION | Age: 89
End: 2024-07-18

## 2024-07-18 NOTE — CARE COORDINATION
Ambulatory Care Coordination Note     7/18/2024 3:49 PM     Patient outreach attempt by this ACM today to offer care management services. ACM was unable to reach the patient by telephone today;  asked to call back. CTN referral for further care transition needs post discharge        Reason for Referral: HTN, CHF  Patient hospitalized 6/8/2024 through 6/12/2024.   The following challenges have been identified  Needs addressed:  Patients top risk factors for readmission: functional physical ability  Falls, she uses crutches for  mobility.   Gets sob when she walks a distance per hand/back notes.      ACM: Sharon Dunn RN       PCP/Specialist follow up:       Follow Up:   Plan for next ACM outreach in approximately 1-2 days  to complete:  Need follow up for enrollment in care coordination. CTN referral   .

## 2024-07-22 ENCOUNTER — CARE COORDINATION (OUTPATIENT)
Dept: CARE COORDINATION | Age: 89
End: 2024-07-22

## 2024-07-22 ENCOUNTER — TELEMEDICINE (OUTPATIENT)
Dept: FAMILY MEDICINE CLINIC | Age: 89
End: 2024-07-22
Payer: MEDICARE

## 2024-07-22 DIAGNOSIS — Z00.00 MEDICARE ANNUAL WELLNESS VISIT, SUBSEQUENT: Primary | ICD-10-CM

## 2024-07-22 PROCEDURE — 1123F ACP DISCUSS/DSCN MKR DOCD: CPT | Performed by: FAMILY MEDICINE

## 2024-07-22 PROCEDURE — G0439 PPPS, SUBSEQ VISIT: HCPCS | Performed by: FAMILY MEDICINE

## 2024-07-22 NOTE — PATIENT INSTRUCTIONS
"2345: \"Can you place IMC orders please? Pt on amio gtt.\"     IMC orders placed.    0118: \"Pt c/o severe anxiety and stating he is worried he cant sleep because he wont wake up. Can you please order IV Ativan or anxiety medication?\"  MD ordered PRN IV ativan.  " avoid saturated and trans fats. They increase your risk of heart disease by raising cholesterol levels.     Limit the amount of solid fat--butter, margarine, and shortening--you eat. Use olive, peanut, or canola oil when you cook. Bake, broil, and steam foods instead of frying them.     Eat a variety of fruit and vegetables every day. Dark green, deep orange, red, or yellow fruits and vegetables are especially good for you. Examples include spinach, carrots, peaches, and berries.     Foods high in fiber can reduce your cholesterol and provide important vitamins and minerals. High-fiber foods include whole-grain cereals and breads, oatmeal, beans, brown rice, citrus fruits, and apples.     Eat lean proteins. Heart-healthy proteins include seafood, lean meats and poultry, eggs, beans, peas, nuts, seeds, and soy products.     Limit drinks and foods with added sugar. These include candy, desserts, and soda pop.   Heart-healthy lifestyle    If your doctor recommends it, get more exercise. For many people, walking is a good choice. Or you may want to swim, bike, or do other activities. Bit by bit, increase the time you're active every day. Try for at least 30 minutes on most days of the week.     Try to quit or cut back on using tobacco and other nicotine products. This includes smoking and vaping. If you need help quitting, talk to your doctor about stop-smoking programs and medicines. These can increase your chances of quitting for good. Quitting is one of the most important things you can do to protect your heart. It is never too late to quit. Try to avoid secondhand smoke too.     Stay at a weight that's healthy for you. Talk to your doctor if you need help losing weight.     Try to get 7 to 9 hours of sleep each night.     Limit alcohol to 2 drinks a day for men and 1 drink a day for women. Too much alcohol can cause health problems.     Manage other health problems such as diabetes, high blood pressure, and high

## 2024-07-22 NOTE — CARE COORDINATION
Ambulatory Care Coordination Note     2024 3:31 PM     Patient Current Location:  [place]     This patient was received as a referral from Ambulatory Care Manager .    ACM contacted the patient by telephone. Verified name and  with patient as identifiers. Provided introduction to self, and explanation of the ACM role.   Patient accepted care management services at this time.          ACM: Sharon Dunn RN       Method of communication with provider: chart routing.    Care Summary Note: Follow up introduction call made for care coordination. CTN referral for additional needs.  She is doing ok but states she is getting short of breath with small distances of walking. Uses 2 canes. Occassional swelling using KEN hose. No increase currently. She has home care and private duty care giver assisting. Feels safe. No falls reported. Fall safety stressed. Daughter is a RN and helps to. She has help with med organizaing and with bp checks. No dizziness reported Recent hospital visit for SBO. No current bowel issues note. She has hx of NSTEMI and stent placement in past per patient. No CP.    Past Medical History:   Diagnosis Date    Arthritis     Arthritis     possibly Rheumatoid, Dr. Altman    CAD (coronary artery disease)     GERD (gastroesophageal reflux disease)     Hyperlipidemia     Hypertension     Thyroid disease            Offered patient enrollment in the Remote Patient Monitoring (RPM) program for in-home monitoring: being monitored for bp an HR at home. Can not stand on a scale without holding on to something. Not a good rpm candidate at this time.      Assessments Completed:       2024     3:09 PM   Amb Fall Risk Assessment and TUG Test   Do you feel unsteady or are you worried about falling?  yes   2 or more falls in past year? no   Fall with injury in past year? no    ,   Ambulatory Care Coordination Assessment    Care Coordination Protocol  Referral from Primary Care Provider: No  Week 1 - Initial

## 2024-07-22 NOTE — PROGRESS NOTES
Medicare Annual Wellness Visit    Vj Gold is here for Medicare AWV    Assessment & Plan   Medicare annual wellness visit, subsequent  Recommendations for Preventive Services Due: see orders and patient instructions/AVS.  Recommended screening schedule for the next 5-10 years is provided to the patient in written form: see Patient Instructions/AVS.     No follow-ups on file.     Subjective       Patient's complete Health Risk Assessment and screening values have been reviewed and are found in Flowsheets. The following problems were reviewed today and where indicated follow up appointments were made and/or referrals ordered.    Positive Risk Factor Screenings with Interventions:                   Dentist Screen:  Have you seen the dentist within the past year?: (!) No (patient stated that she does not go anywhere)    Intervention:  Patient declines any further evaluation or treatment    Explained to patient the importance of dental care. Dental issues can lead to health issues     Vision Screen:  Do you have difficulty driving, watching TV, or doing any of your daily activities because of your eyesight?: No  Have you had an eye exam within the past year?: (!) No (patient stated that she does not go anywhere)  Interventions:    Explained to patient to have a yearly eye exam     ADL's:   Patient reports needing help with:  Select all that apply: (!) Toileting, Dressing, Bathing, Grooming  Select all that apply: (!) Laundry, Housekeeping, Banking/Finances, Shopping, Telephone Use, Food Preparation, Transportation  Interventions:  See AVS for additional education material                  Objective    Patient-Reported Vitals  No data recorded               Allergies   Allergen Reactions    Adhesive Tape      Blisters     Amlodipine Hives    Bactrim [Sulfamethoxazole-Trimethoprim] Other (See Comments)     Muscle Weakness     Prior to Visit Medications    Medication Sig Taking? Authorizing Provider   erythromycin

## 2024-07-29 ENCOUNTER — CARE COORDINATION (OUTPATIENT)
Dept: CARE COORDINATION | Age: 89
End: 2024-07-29

## 2024-07-29 NOTE — CARE COORDINATION
Sharon RN at 7/29/2024 11:46 AM.  Learner: Patient  Readiness: Acceptance  Method: Explanation, Handout  Response: Needs Reinforcement    Monitoring Weight, taught by Sharon Dunn RN at 7/29/2024 11:46 AM.  Learner: Patient  Readiness: Acceptance  Method: Explanation, Handout  Response: Needs Reinforcement    Education Comments  No comments found.     ,    Goals Addressed    None          PCP/Specialist follow up:       Follow Up:   Plan for next ACM outreach in approximately 2 weeks to complete:  - CC Protocol assessments  - disease specific assessments  - advance care planning   - goal progression  - education   - consider d/c if stable.  .   Patient  is agreeable to this plan.

## 2024-07-31 RX ORDER — TRAZODONE HYDROCHLORIDE 50 MG/1
TABLET ORAL
Qty: 45 TABLET | Refills: 0 | Status: SHIPPED | OUTPATIENT
Start: 2024-07-31

## 2024-08-06 DIAGNOSIS — F51.04 PSYCHOPHYSIOLOGICAL INSOMNIA: ICD-10-CM

## 2024-08-07 DIAGNOSIS — Z87.11 HISTORY OF GASTRIC ULCER: ICD-10-CM

## 2024-08-07 RX ORDER — ZOLPIDEM TARTRATE 5 MG/1
TABLET ORAL
Qty: 30 TABLET | Refills: 2 | Status: SHIPPED | OUTPATIENT
Start: 2024-08-07 | End: 2024-08-09 | Stop reason: SDUPTHER

## 2024-08-07 RX ORDER — SUCRALFATE 1 G/1
1 TABLET ORAL 4 TIMES DAILY
Qty: 360 TABLET | Refills: 0 | Status: SHIPPED | OUTPATIENT
Start: 2024-08-07

## 2024-08-07 NOTE — TELEPHONE ENCOUNTER
Refill Request - Controlled Substance    CONFIRM preferrred pharmacy with the patient.    If Mail Order Rx - Pend for 90 day refill.        Last Seen Department: 7/22/2024  Last Seen by PCP: 7/22/2024    Last Written: 5/1/24    Last UDS: not on record    Med Agreement Signed On: not on record    If no future appointment scheduled, route STAFF MESSAGE with patient name to the  Pool for scheduling.CONFIRM preferrred pharmacy with the patient.     Next Appointment:   No future appointments.    Message sent to  to schedule appt with patient?  N/A      Requested Prescriptions     Pending Prescriptions Disp Refills    zolpidem (AMBIEN) 5 MG tablet [Pharmacy Med Name: ZOLPIDEM TARTRATE 5 MG TABLET] 30 tablet      Sig: TAKE 1 TABLET BY MOUTH NIGHTLY AS NEEDED FOR SLEEP MAX DAILY 1TAB

## 2024-08-07 NOTE — TELEPHONE ENCOUNTER
Sucralfate 1 GM    [facility]      Last OV- VV AWV 7/22/24    Next OV- None

## 2024-08-09 DIAGNOSIS — F51.04 PSYCHOPHYSIOLOGICAL INSOMNIA: Primary | ICD-10-CM

## 2024-08-09 DIAGNOSIS — Z87.11 HISTORY OF GASTRIC ULCER: ICD-10-CM

## 2024-08-09 RX ORDER — ZOLPIDEM TARTRATE 5 MG/1
5 TABLET ORAL NIGHTLY PRN
Qty: 30 TABLET | Refills: 2 | Status: SHIPPED | OUTPATIENT
Start: 2024-08-09 | End: 2024-11-09

## 2024-08-09 NOTE — TELEPHONE ENCOUNTER
Refill Request     CONFIRM preferrred pharmacy with the patient.    If Mail Order Rx - Pend for 90 day refill.      Last Seen: Last Seen Department: 7/22/2024  Last Seen by PCP: 7/22/2024    Last Written: 5-    If no future appointment scheduled, route STAFF MESSAGE with patient name to the  Pool for scheduling.      Next Appointment:   No future appointments.    Message sent to  to schedule appt with patient?  N/A      Requested Prescriptions     Pending Prescriptions Disp Refills    ELIQUIS 5 MG TABS tablet [Pharmacy Med Name: ELIQUIS 5 MG TABLET] 60 tablet 2     Sig: TAKE 1 TABLET BY MOUTH TWICE A DAY

## 2024-08-11 RX ORDER — APIXABAN 5 MG/1
TABLET, FILM COATED ORAL
Qty: 60 TABLET | Refills: 2 | Status: SHIPPED | OUTPATIENT
Start: 2024-08-11

## 2024-08-12 RX ORDER — SUCRALFATE 1 G/1
1 TABLET ORAL 4 TIMES DAILY
Qty: 360 TABLET | Refills: 0 | Status: SHIPPED | OUTPATIENT
Start: 2024-08-12

## 2024-08-12 RX ORDER — LANOLIN ALCOHOL/MO/W.PET/CERES
CREAM (GRAM) TOPICAL
Qty: 90 TABLET | Refills: 0 | Status: SHIPPED | OUTPATIENT
Start: 2024-08-12

## 2024-08-12 NOTE — TELEPHONE ENCOUNTER
Vj Gold is requesting refill(s)   Last OV 7/22/2024   (pertaining to medication)  LR 8/7/2024 for sucralfate (per medication requested)  Next office visit scheduled or attempted No   If no, reason:

## 2024-08-22 ENCOUNTER — CARE COORDINATION (OUTPATIENT)
Dept: CARE COORDINATION | Age: 89
End: 2024-08-22

## 2024-08-22 ASSESSMENT — SOCIAL DETERMINANTS OF HEALTH (SDOH)
IN A TYPICAL WEEK, HOW MANY TIMES DO YOU TALK ON THE PHONE WITH FAMILY, FRIENDS, OR NEIGHBORS?: THREE TIMES A WEEK
HOW OFTEN DO YOU GET TOGETHER WITH FRIENDS OR RELATIVES?: THREE TIMES A WEEK
HOW OFTEN DO YOU ATTENT MEETINGS OF THE CLUB OR ORGANIZATION YOU BELONG TO?: NEVER
DO YOU BELONG TO ANY CLUBS OR ORGANIZATIONS SUCH AS CHURCH GROUPS UNIONS, FRATERNAL OR ATHLETIC GROUPS, OR SCHOOL GROUPS?: NO
HOW OFTEN DO YOU ATTEND CHURCH OR RELIGIOUS SERVICES?: NEVER

## 2024-08-22 NOTE — ACP (ADVANCE CARE PLANNING)
Advance Care Planning Note      Date: 8/22/2024    Patient Current Location:  [place]    ACP Specialist:  Sharon Dunn RN    Spoke with Vj. Updated her HCD makers. She is not sure if she has a current AD/LW or DPOA  for HC. She would like and information packet sent out for follow up.

## 2024-08-26 ENCOUNTER — CARE COORDINATION (OUTPATIENT)
Dept: CARE COORDINATION | Age: 89
End: 2024-08-26

## 2024-08-26 NOTE — CARE COORDINATION
Advance Care Planning Note      Date: 8/26/2024    Patient Current Location:  Home: [address] [place] [id number]    ACP Specialist:  SACHA Rivera    Date Referral Received:8/26/24    Initial Outreach:     Outreach call to patient in follow-up to ACP Specialist referral from: Ambulatory Care Manager Shaun  requesting assistance with new advance directive completion  Patient unsure if she has both durable and HCPOA as she does have POA paperwork established. She stated her daughter, Esme, is her POA and asked that this worker call her to verify she has HCPOA as well. SW left voicemail for daughter and requested return call.     Next Step:    Outreach again in 1 week         Thank you for this referral.      Cindy Shields MSW, SACHA     [phone]

## 2024-09-03 ENCOUNTER — CARE COORDINATION (OUTPATIENT)
Dept: CARE COORDINATION | Age: 89
End: 2024-09-03

## 2024-09-03 NOTE — CARE COORDINATION
NEHA placed return call to daughter, Esme, who stated she believes patient has documents established although she is uncertain where they are in patient's home. She requests for AD packet to be mailed out. [facility] and will follow up with patient accordingly.     Cindy Shields MSW, LSW     [phone]

## 2024-09-18 ENCOUNTER — CARE COORDINATION (OUTPATIENT)
Dept: CARE COORDINATION | Age: 89
End: 2024-09-18

## 2024-09-24 ENCOUNTER — CARE COORDINATION (OUTPATIENT)
Dept: CASE MANAGEMENT | Age: 89
End: 2024-09-24

## 2024-09-25 ENCOUNTER — CARE COORDINATION (OUTPATIENT)
Dept: CARE COORDINATION | Age: 89
End: 2024-09-25

## 2024-09-25 DIAGNOSIS — R35.0 URINARY FREQUENCY: ICD-10-CM

## 2024-09-25 RX ORDER — SOLIFENACIN SUCCINATE 5 MG/1
TABLET, FILM COATED ORAL
Qty: 90 TABLET | Refills: 0 | Status: SHIPPED | OUTPATIENT
Start: 2024-09-25

## 2024-09-30 ENCOUNTER — CARE COORDINATION (OUTPATIENT)
Dept: CARE COORDINATION | Age: 89
End: 2024-09-30

## 2024-09-30 NOTE — CARE COORDINATION
Chart reviewed. Follow up call completed and SW follow up. ACM will completed care coordination at this time.

## 2024-10-02 DIAGNOSIS — I10 ESSENTIAL HYPERTENSION: ICD-10-CM

## 2024-10-02 RX ORDER — LOSARTAN POTASSIUM 50 MG/1
TABLET ORAL
Qty: 180 TABLET | Refills: 0 | Status: SHIPPED | OUTPATIENT
Start: 2024-10-02

## 2024-10-02 NOTE — TELEPHONE ENCOUNTER
Refill Request     CONFIRM preferrred pharmacy with the patient.    If Mail Order Rx - Pend for 90 day refill.      Last Seen: Last Seen Department: 7/22/2024  Last Seen by PCP: 7/22/2024    Last Written: 5.24.24    If no future appointment scheduled, route STAFF MESSAGE with patient name to the  Pool for scheduling.      Next Appointment:   No future appointments.    Message sent to  to schedule appt with patient?  NO      Requested Prescriptions     Pending Prescriptions Disp Refills    losartan (COZAAR) 50 MG tablet [Pharmacy Med Name: LOSARTAN POTASSIUM 50 MG TAB] 180 tablet 0     Sig: TAKE 1 TABLET BY MOUTH IN THE MORNING AND ONE TABLET IN THE EVENING.

## 2024-10-03 RX ORDER — PANTOPRAZOLE SODIUM 40 MG/1
40 TABLET, DELAYED RELEASE ORAL DAILY
Qty: 180 TABLET | Refills: 0 | Status: SHIPPED | OUTPATIENT
Start: 2024-10-03

## 2024-10-03 NOTE — TELEPHONE ENCOUNTER
Refill Request     CONFIRM preferrred pharmacy with the patient.    If Mail Order Rx - Pend for 90 day refill.      Last Seen: Last Seen Department: 7/22/2024  Last Seen by PCP: 7/22/2024    Last Written: 4/6/23    If no future appointment scheduled, route STAFF MESSAGE with patient name to the  Pool for scheduling.      Next Appointment:   No future appointments.    Message sent to  to schedule appt with patient?  N/A      Requested Prescriptions     Pending Prescriptions Disp Refills    pantoprazole (PROTONIX) 40 MG tablet 180 tablet 0     Sig: Take 1 tablet by mouth daily

## 2024-10-07 RX ORDER — CARVEDILOL 3.12 MG/1
TABLET ORAL
Qty: 180 TABLET | Refills: 1 | Status: SHIPPED | OUTPATIENT
Start: 2024-10-07

## 2024-10-28 ENCOUNTER — TELEPHONE (OUTPATIENT)
Dept: FAMILY MEDICINE CLINIC | Age: 89
End: 2024-10-28

## 2024-10-28 DIAGNOSIS — K08.89 PAIN IN TOOTH: Primary | ICD-10-CM

## 2024-10-28 RX ORDER — AMOXICILLIN 875 MG/1
875 TABLET, COATED ORAL 2 TIMES DAILY
Qty: 14 TABLET | Refills: 0 | Status: SHIPPED | OUTPATIENT
Start: 2024-10-28 | End: 2024-11-04

## 2024-10-28 NOTE — TELEPHONE ENCOUNTER
Pt's daughter called an stated that pt has swelling in her left cheek and complaining that it is sore. States that she is worried that she may have an infected tooth. Was wanting to see if she could get something called in to [facility] in [place].

## 2024-10-28 NOTE — PROGRESS NOTES
Per provider notes in telephone encuonter on 10/28/24 script for amoxicillin 875mg quantit #14 with one tablet twice daily sent to patients pharmacy.

## 2024-10-31 DIAGNOSIS — R23.4 CRUSTING OF SKIN OF EYELID: ICD-10-CM

## 2024-10-31 RX ORDER — ERYTHROMYCIN 5 MG/G
OINTMENT OPHTHALMIC
Qty: 3.5 G | Refills: 0 | Status: SHIPPED | OUTPATIENT
Start: 2024-10-31

## 2024-10-31 NOTE — TELEPHONE ENCOUNTER
Refill Request     CONFIRM preferrred pharmacy with the patient.    If Mail Order Rx - Pend for 90 day refill.      Last Seen: Last Seen Department: 7/22/2024  Last Seen by PCP: 7/22/2024    Last Written: 7/10/24    If no future appointment scheduled, route STAFF MESSAGE with patient name to the  Pool for scheduling.      Next Appointment:   No future appointments.    Message sent to  to schedule appt with patient?  N/A      Requested Prescriptions     Pending Prescriptions Disp Refills    erythromycin (ROMYCIN) 5 MG/GM ophthalmic ointment 3.5 g 0     Sig: Apply thin ribbon of ointment to the affected eye twice daily for a week

## 2024-11-04 DIAGNOSIS — F51.04 PSYCHOPHYSIOLOGICAL INSOMNIA: ICD-10-CM

## 2024-11-04 RX ORDER — ZOLPIDEM TARTRATE 5 MG/1
TABLET ORAL
Qty: 30 TABLET | Refills: 2 | Status: SHIPPED | OUTPATIENT
Start: 2024-11-04 | End: 2025-02-02

## 2024-11-04 NOTE — TELEPHONE ENCOUNTER
Refill Request     CONFIRM preferrred pharmacy with the patient.    If Mail Order Rx - Pend for 90 day refill.      Last Seen: Last Seen Department: 7/22/2024  Last Seen by PCP: 7/22/2024    Last Written: 8.9.24    If no future appointment scheduled, route STAFF MESSAGE with patient name to the  Pool for scheduling.      Next Appointment:   No future appointments.    Message sent to  to schedule appt with patient?  NO      Requested Prescriptions     Pending Prescriptions Disp Refills    zolpidem (AMBIEN) 5 MG tablet [Pharmacy Med Name: ZOLPIDEM TARTRATE 5 MG TABLET] 30 tablet 2     Sig: TAKE 1 TABLET BY MOUTH NIGHTLY AS NEEDED FOR SLEEP MAX DAILY 1TAB

## 2024-12-18 RX ORDER — TRAZODONE HYDROCHLORIDE 50 MG/1
TABLET, FILM COATED ORAL
Qty: 45 TABLET | Refills: 0 | Status: SHIPPED | OUTPATIENT
Start: 2024-12-18

## 2024-12-18 NOTE — TELEPHONE ENCOUNTER
Refill Request     CONFIRM preferrred pharmacy with the patient.    If Mail Order Rx - Pend for 90 day refill.      Last Seen: Last Seen Department: 7/22/2024  Last Seen by PCP: 7/22/2024    Last Written: 10/2/24    If no future appointment scheduled, route STAFF MESSAGE with patient name to the  Pool for scheduling.      Next Appointment:   No future appointments.    Message sent to  to schedule appt with patient?  NO      Requested Prescriptions     Pending Prescriptions Disp Refills    traZODone (DESYREL) 50 MG tablet [Pharmacy Med Name: TRAZODONE 50 MG TABLET] 45 tablet 0     Sig: TAKE 1/2 TABLETS BY MOUTH NIGHTLY FOR SLEEP

## 2024-12-24 RX ORDER — APIXABAN 5 MG/1
TABLET, FILM COATED ORAL
Qty: 60 TABLET | Refills: 2 | Status: SHIPPED | OUTPATIENT
Start: 2024-12-24

## 2024-12-24 NOTE — TELEPHONE ENCOUNTER
Refill Request     CONFIRM preferrred pharmacy with the patient.    If Mail Order Rx - Pend for 90 day refill.      Last Seen: Last Seen Department: 7/22/2024  Last Seen by PCP: 7/22/2024    Last Written:   If no future appointment scheduled, route STAFF MESSAGE with patient name to the  Pool for scheduling.      Next Appointment:   No future appointments.    Message sent to  to schedule appt with patient?        Requested Prescriptions     Pending Prescriptions Disp Refills    ELIQUIS 5 MG TABS tablet [Pharmacy Med Name: ELIQUIS 5 MG TABLET] 60 tablet 2     Sig: TAKE 1 TABLET BY MOUTH TWICE A DAY

## 2025-01-02 ENCOUNTER — TELEPHONE (OUTPATIENT)
Dept: FAMILY MEDICINE CLINIC | Age: 89
End: 2025-01-02

## 2025-01-02 NOTE — TELEPHONE ENCOUNTER
Daughter calling on behalf of the pt. She is switching providers due to the other office doing home calls--pt is 89 years old. I have faxed over a [email or web address] form to [email or web address] per the daughters request as she lives in [place] and unable to come into the office for it.

## 2025-01-06 DIAGNOSIS — Z87.11 HISTORY OF GASTRIC ULCER: ICD-10-CM

## 2025-01-06 DIAGNOSIS — I10 ESSENTIAL HYPERTENSION: ICD-10-CM

## 2025-01-07 ENCOUNTER — TELEPHONE (OUTPATIENT)
Dept: FAMILY MEDICINE CLINIC | Age: 89
End: 2025-01-07

## 2025-01-07 RX ORDER — LEVOTHYROXINE SODIUM 150 UG/1
TABLET ORAL
Qty: 90 TABLET | Refills: 0 | Status: SHIPPED | OUTPATIENT
Start: 2025-01-07

## 2025-01-07 RX ORDER — SUCRALFATE 1 G/1
1 TABLET ORAL 4 TIMES DAILY
Qty: 360 TABLET | Refills: 0 | Status: SHIPPED | OUTPATIENT
Start: 2025-01-07

## 2025-01-07 RX ORDER — LOSARTAN POTASSIUM 50 MG/1
TABLET ORAL
Qty: 180 TABLET | Refills: 0 | Status: SHIPPED | OUTPATIENT
Start: 2025-01-07

## 2025-01-07 RX ORDER — TRAZODONE HYDROCHLORIDE 50 MG/1
TABLET, FILM COATED ORAL
Qty: 45 TABLET | Refills: 0 | Status: SHIPPED | OUTPATIENT
Start: 2025-01-07

## 2025-01-07 RX ORDER — PANTOPRAZOLE SODIUM 40 MG/1
40 TABLET, DELAYED RELEASE ORAL DAILY
Qty: 90 TABLET | Refills: 0 | Status: SHIPPED | OUTPATIENT
Start: 2025-01-07

## 2025-01-07 NOTE — TELEPHONE ENCOUNTER
Refill Request     CONFIRM preferrred pharmacy with the patient.    If Mail Order Rx - Pend for 90 day refill.      Last Seen: Last Seen Department: 7/22/2024  Last Seen by PCP: 7/22/2024    Last Written: 8-    If no future appointment scheduled, route STAFF MESSAGE with patient name to the  Pool for scheduling.      Next Appointment:   No future appointments.    Message sent to  to schedule appt with patient?  NO      Requested Prescriptions     Pending Prescriptions Disp Refills    sucralfate (CARAFATE) 1 GM tablet [Pharmacy Med Name: SUCRALFATE 1 GM TABLET] 360 tablet 0     Sig: TAKE 1 TABLET BY MOUTH IN THE MORNING, AT NOON, IN THE EVENING, AND AT BEDTIME    pantoprazole (PROTONIX) 40 MG tablet [Pharmacy Med Name: PANTOPRAZOLE SOD DR 40 MG TAB] 90 tablet 1     Sig: TAKE 1 TABLET BY MOUTH EVERY DAY    levothyroxine (SYNTHROID) 150 MCG tablet [Pharmacy Med Name: LEVOTHYROXINE 150 MCG TABLET] 90 tablet 3     Sig: TAKE 1 TABLET BY MOUTH EVERY DAY    losartan (COZAAR) 50 MG tablet [Pharmacy Med Name: LOSARTAN POTASSIUM 50 MG TAB] 180 tablet 0     Sig: TAKE 1 TABLET BY MOUTH IN THE MORNING AND ONE TABLET IN THE EVENING.    traZODone (DESYREL) 50 MG tablet [Pharmacy Med Name: TRAZODONE 50 MG TABLET] 45 tablet 0     Sig: TAKE 1/2 TABLETS BY MOUTH NIGHTLY FOR SLEEP

## 2025-01-14 ENCOUNTER — HOSPITAL ENCOUNTER (OUTPATIENT)
Age: 89
Setting detail: SPECIMEN
Discharge: HOME | End: 2025-01-14
Payer: MEDICARE

## 2025-01-14 LAB
ALBUMIN SERPL-MCNC: 3.9 G/DL (ref 3.4–5)
ALBUMIN/GLOB SERPL: 1.3 {RATIO} (ref 1.1–2.2)
ALP SERPL-CCNC: 91 U/L (ref 40–129)
ALT SERPL-CCNC: 15 U/L (ref 10–40)
ANION GAP SERPL CALCULATED.3IONS-SCNC: 11 MMOL/L (ref 3–16)
AST SERPL-CCNC: 22 U/L (ref 15–37)
BASOPHILS # BLD: 0 K/UL (ref 0–0.2)
BASOPHILS NFR BLD: 1.1 %
BILIRUB SERPL-MCNC: 0.3 MG/DL (ref 0–1)
BUN SERPL-MCNC: 14 MG/DL (ref 7–20)
CALCIUM SERPL-MCNC: 9.4 MG/DL (ref 8.3–10.6)
CHLORIDE SERPL-SCNC: 102 MMOL/L (ref 99–110)
CO2 SERPL-SCNC: 28 MMOL/L (ref 21–32)
CREAT SERPL-MCNC: <0.5 MG/DL (ref 0.6–1.2)
DEPRECATED RDW RBC AUTO: 17.6 % (ref 12.4–15.4)
EOSINOPHIL # BLD: 0.4 K/UL (ref 0–0.6)
EOSINOPHIL NFR BLD: 9.2 %
GFR SERPLBLD CREATININE-BSD FMLA CKD-EPI: 85 ML/MIN/{1.73_M2}
GLUCOSE SERPL-MCNC: 106 MG/DL (ref 70–99)
HCT VFR BLD AUTO: 36.4 % (ref 36–48)
HGB BLD-MCNC: 11.8 G/DL (ref 12–16)
LYMPHOCYTES # BLD: 1 K/UL (ref 1–5.1)
LYMPHOCYTES NFR BLD: 22.1 %
MCH RBC QN AUTO: 29.8 PG (ref 26–34)
MCHC RBC AUTO-ENTMCNC: 32.4 G/DL (ref 31–36)
MCV RBC AUTO: 92.1 FL (ref 80–100)
MONOCYTES # BLD: 0.6 K/UL (ref 0–1.3)
MONOCYTES NFR BLD: 13.2 %
NEUTROPHILS # BLD: 2.4 K/UL (ref 1.7–7.7)
NEUTROPHILS NFR BLD: 54.4 %
PLATELET # BLD AUTO: 181 K/UL (ref 135–450)
PMV BLD AUTO: 8.3 FL (ref 5–10.5)
POTASSIUM SERPL-SCNC: 4.3 MMOL/L (ref 3.5–5.1)
PROT SERPL-MCNC: 6.9 G/DL (ref 6.4–8.2)
RBC # BLD AUTO: 3.96 M/UL (ref 4–5.2)
SODIUM SERPL-SCNC: 141 MMOL/L (ref 136–145)
WBC # BLD AUTO: 4.4 K/UL (ref 4–11)

## 2025-01-14 PROCEDURE — 80053 COMPREHEN METABOLIC PANEL: CPT

## 2025-01-14 PROCEDURE — 85025 COMPLETE CBC W/AUTO DIFF WBC: CPT

## 2025-01-23 DIAGNOSIS — R23.4 CRUSTING OF SKIN OF EYELID: ICD-10-CM

## 2025-01-24 RX ORDER — ERYTHROMYCIN 5 MG/G
OINTMENT OPHTHALMIC
Qty: 3.5 G | Refills: 0 | OUTPATIENT
Start: 2025-01-24

## 2025-01-28 DIAGNOSIS — R23.4 CRUSTING OF SKIN OF EYELID: ICD-10-CM

## 2025-01-28 RX ORDER — ERYTHROMYCIN 5 MG/G
OINTMENT OPHTHALMIC
Qty: 3.5 G | Refills: 0 | OUTPATIENT
Start: 2025-01-28

## 2025-01-29 RX ORDER — ERYTHROMYCIN 5 MG/G
OINTMENT OPHTHALMIC
Qty: 3.5 G | Refills: 0 | OUTPATIENT
Start: 2025-01-29

## 2025-01-29 NOTE — TELEPHONE ENCOUNTER
Refill Request     CONFIRM preferrred pharmacy with the patient.    If Mail Order Rx - Pend for 90 day refill.      Last Seen: Last Seen Department: 7/22/2024  Last Seen by PCP: 7/22/2024    Last Written: 10-    If no future appointment scheduled, route STAFF MESSAGE with patient name to the  Pool for scheduling.      Next Appointment:   No future appointments.    Message sent to  to schedule appt with patient?  NO      Requested Prescriptions     Pending Prescriptions Disp Refills    erythromycin (ROMYCIN) 5 MG/GM ophthalmic ointment [Pharmacy Med Name: ERYTHROMYCIN 0.5% EYE OINTMENT] 3.5 g 0     Sig: APPLY THIN RIBBON OF OINTMENT TO THE AFFECTED EYE TWICE DAILY FOR A WEEK

## 2025-01-30 DIAGNOSIS — R23.4 CRUSTING OF SKIN OF EYELID: ICD-10-CM

## 2025-01-30 DIAGNOSIS — F51.04 PSYCHOPHYSIOLOGICAL INSOMNIA: ICD-10-CM

## 2025-01-30 RX ORDER — ERYTHROMYCIN 5 MG/G
OINTMENT OPHTHALMIC
Qty: 3.5 G | Refills: 0 | OUTPATIENT
Start: 2025-01-30

## 2025-01-30 RX ORDER — ZOLPIDEM TARTRATE 5 MG/1
TABLET ORAL
Qty: 30 TABLET | Refills: 2 | OUTPATIENT
Start: 2025-01-30 | End: 2025-04-30

## 2025-01-30 RX ORDER — BISACODYL 5 MG/1
5 TABLET, DELAYED RELEASE ORAL DAILY PRN
Qty: 30 TABLET | Refills: 0 | OUTPATIENT
Start: 2025-01-30

## 2025-01-30 NOTE — TELEPHONE ENCOUNTER
Refill Request     CONFIRM preferrred pharmacy with the patient.    If Mail Order Rx - Pend for 90 day refill.      Last Seen: Last Seen Department: 7/22/2024  Last Seen by PCP: Visit date not found    Last Written: bisacodyl 6/12//24  Erythromycin ophthalmic ointment 10/31/24    If no future appointment scheduled, route STAFF MESSAGE with patient name to the  Pool for scheduling.      Next Appointment:   No future appointments.    Message sent to  to schedule appt with patient?  N/A      Requested Prescriptions     Pending Prescriptions Disp Refills    erythromycin (ROMYCIN) 5 MG/GM ophthalmic ointment 3.5 g 0     Sig: Apply thin ribbon of ointment to the affected eye twice daily for a week    bisacodyl (DULCOLAX) 5 MG EC tablet 30 tablet 0     Sig: Take 1 tablet by mouth daily as needed for Constipation

## 2025-03-17 RX ORDER — APIXABAN 5 MG/1
5 TABLET, FILM COATED ORAL 2 TIMES DAILY
Qty: 60 TABLET | Refills: 2 | OUTPATIENT
Start: 2025-03-17

## 2025-03-18 DIAGNOSIS — I10 ESSENTIAL HYPERTENSION: ICD-10-CM

## 2025-03-18 RX ORDER — APIXABAN 5 MG/1
5 TABLET, FILM COATED ORAL 2 TIMES DAILY
Qty: 60 TABLET | Refills: 2 | OUTPATIENT
Start: 2025-03-18

## 2025-03-18 RX ORDER — LOSARTAN POTASSIUM 50 MG/1
TABLET ORAL
Qty: 180 TABLET | Refills: 0 | OUTPATIENT
Start: 2025-03-18

## 2025-03-24 RX ORDER — TRAZODONE HYDROCHLORIDE 50 MG/1
TABLET ORAL
Qty: 45 TABLET | Refills: 0 | OUTPATIENT
Start: 2025-03-24

## 2025-03-24 RX ORDER — LEVOTHYROXINE SODIUM 150 UG/1
150 TABLET ORAL DAILY
Qty: 90 TABLET | Refills: 0 | OUTPATIENT
Start: 2025-03-24

## 2025-03-31 RX ORDER — PANTOPRAZOLE SODIUM 40 MG/1
40 TABLET, DELAYED RELEASE ORAL DAILY
Qty: 90 TABLET | Refills: 0 | OUTPATIENT
Start: 2025-03-31

## 2025-03-31 RX ORDER — TRAZODONE HYDROCHLORIDE 50 MG/1
TABLET ORAL
Qty: 45 TABLET | Refills: 0 | OUTPATIENT
Start: 2025-03-31

## 2025-04-04 DIAGNOSIS — I10 ESSENTIAL HYPERTENSION: ICD-10-CM

## 2025-04-04 RX ORDER — LOSARTAN POTASSIUM 50 MG/1
TABLET ORAL
Qty: 180 TABLET | Refills: 0 | OUTPATIENT
Start: 2025-04-04

## 2025-04-04 RX ORDER — PANTOPRAZOLE SODIUM 40 MG/1
40 TABLET, DELAYED RELEASE ORAL DAILY
Qty: 90 TABLET | Refills: 0 | OUTPATIENT
Start: 2025-04-04

## 2025-04-08 DIAGNOSIS — R35.0 URINARY FREQUENCY: ICD-10-CM

## 2025-04-08 RX ORDER — SOLIFENACIN SUCCINATE 5 MG/1
5 TABLET, FILM COATED ORAL DAILY
Qty: 90 TABLET | Refills: 0 | OUTPATIENT
Start: 2025-04-08

## 2025-04-08 RX ORDER — CARVEDILOL 3.12 MG/1
3.12 TABLET ORAL 2 TIMES DAILY WITH MEALS
Qty: 180 TABLET | Refills: 1 | OUTPATIENT
Start: 2025-04-08

## 2025-04-08 NOTE — TELEPHONE ENCOUNTER
Goal Outcome Evaluation:    End of Shift Summary  For vital signs and complete assessments, please see documentation flowsheets.     Pertinent assessments: A&O to self only. BP elevated, otherwise VSS. Denies pain. Incontinent of B&B, had 1 loose BM. Purewick in place. Poor appetite.      Major Shift Events: None    Treatment Plan: Monitor electrolytes, monitor stools, encourage intake.     Isaura Carmona RN             Patient has established with new PCP Dr. Saab

## 2025-04-22 DIAGNOSIS — Z87.11 HISTORY OF GASTRIC ULCER: ICD-10-CM

## 2025-04-22 DIAGNOSIS — I10 ESSENTIAL HYPERTENSION: ICD-10-CM

## 2025-04-22 RX ORDER — LOSARTAN POTASSIUM 50 MG/1
TABLET ORAL
Qty: 180 TABLET | Refills: 0 | OUTPATIENT
Start: 2025-04-22

## 2025-04-22 RX ORDER — PANTOPRAZOLE SODIUM 40 MG/1
40 TABLET, DELAYED RELEASE ORAL DAILY
Qty: 90 TABLET | Refills: 0 | OUTPATIENT
Start: 2025-04-22

## 2025-04-23 RX ORDER — SUCRALFATE 1 G/1
1 TABLET ORAL 4 TIMES DAILY
Qty: 360 TABLET | Refills: 0 | OUTPATIENT
Start: 2025-04-23

## 2025-05-09 ENCOUNTER — HOSPITAL ENCOUNTER (OUTPATIENT)
Age: 89
Setting detail: SPECIMEN
Discharge: HOME | End: 2025-05-09
Payer: MEDICARE

## 2025-05-09 LAB
ALBUMIN SERPL-MCNC: 3.8 G/DL (ref 3.4–5)
ALP SERPL-CCNC: 90 U/L (ref 40–129)
ALT SERPL-CCNC: 16 U/L (ref 10–40)
AST SERPL-CCNC: 26 U/L (ref 15–37)
BASOPHILS # BLD: 0 K/UL (ref 0–0.2)
BASOPHILS NFR BLD: 0.8 %
BILIRUB DIRECT SERPL-MCNC: 0.2 MG/DL (ref 0–0.3)
BILIRUB INDIRECT SERPL-MCNC: 0.2 MG/DL (ref 0–1)
BILIRUB SERPL-MCNC: 0.4 MG/DL (ref 0–1)
CREAT SERPL-MCNC: <0.5 MG/DL (ref 0.6–1.2)
DEPRECATED RDW RBC AUTO: 15.2 % (ref 12.4–15.4)
EOSINOPHIL # BLD: 0.5 K/UL (ref 0–0.6)
EOSINOPHIL NFR BLD: 12.5 %
GFR SERPLBLD CREATININE-BSD FMLA CKD-EPI: 85 ML/MIN/{1.73_M2}
HCT VFR BLD AUTO: 38.7 % (ref 36–48)
HGB BLD-MCNC: 12.4 G/DL (ref 12–16)
LYMPHOCYTES # BLD: 0.7 K/UL (ref 1–5.1)
LYMPHOCYTES NFR BLD: 17.2 %
MCH RBC QN AUTO: 32.2 PG (ref 26–34)
MCHC RBC AUTO-ENTMCNC: 32.2 G/DL (ref 31–36)
MCV RBC AUTO: 100 FL (ref 80–100)
MONOCYTES # BLD: 0.5 K/UL (ref 0–1.3)
MONOCYTES NFR BLD: 10.8 %
NEUTROPHILS # BLD: 2.5 K/UL (ref 1.7–7.7)
NEUTROPHILS NFR BLD: 58.7 %
PLATELET # BLD AUTO: 191 K/UL (ref 135–450)
PMV BLD AUTO: 9 FL (ref 5–10.5)
PROT SERPL-MCNC: 6.3 G/DL (ref 6.4–8.2)
RBC # BLD AUTO: 3.87 M/UL (ref 4–5.2)
WBC # BLD AUTO: 4.3 K/UL (ref 4–11)

## 2025-05-09 PROCEDURE — 82565 ASSAY OF CREATININE: CPT

## 2025-05-09 PROCEDURE — 85025 COMPLETE CBC W/AUTO DIFF WBC: CPT

## 2025-05-09 PROCEDURE — 80076 HEPATIC FUNCTION PANEL: CPT

## 2025-05-13 RX ORDER — LEVOTHYROXINE SODIUM 150 UG/1
150 TABLET ORAL DAILY
Qty: 90 TABLET | Refills: 0 | OUTPATIENT
Start: 2025-05-13

## 2025-05-14 RX ORDER — LEVOTHYROXINE SODIUM 150 UG/1
150 TABLET ORAL DAILY
Qty: 90 TABLET | Refills: 0 | OUTPATIENT
Start: 2025-05-14

## 2025-05-27 RX ORDER — TRAZODONE HYDROCHLORIDE 50 MG/1
TABLET ORAL
Qty: 45 TABLET | Refills: 0 | OUTPATIENT
Start: 2025-05-27

## 2025-06-16 RX ORDER — TRAZODONE HYDROCHLORIDE 50 MG/1
TABLET ORAL
Qty: 45 TABLET | Refills: 0 | OUTPATIENT
Start: 2025-06-16

## 2025-06-20 RX ORDER — PANTOPRAZOLE SODIUM 40 MG/1
40 TABLET, DELAYED RELEASE ORAL DAILY
Qty: 90 TABLET | Refills: 0 | OUTPATIENT
Start: 2025-06-20

## 2025-06-24 RX ORDER — TRAZODONE HYDROCHLORIDE 50 MG/1
TABLET ORAL
Qty: 45 TABLET | Refills: 0 | OUTPATIENT
Start: 2025-06-24

## 2025-06-27 RX ORDER — TRAZODONE HYDROCHLORIDE 50 MG/1
TABLET ORAL
Qty: 45 TABLET | Refills: 0 | OUTPATIENT
Start: 2025-06-27

## 2025-07-01 RX ORDER — TRAZODONE HYDROCHLORIDE 50 MG/1
TABLET ORAL
Qty: 45 TABLET | Refills: 0 | OUTPATIENT
Start: 2025-07-01

## (undated) DEVICE — ENDOSCOPIC KIT 2 12 FT OP4 DE2 GWN SYR

## (undated) DEVICE — CONMED SCOPE SAVER BITE BLOCK, 20X27 MM: Brand: SCOPE SAVER

## (undated) DEVICE — FORCEPS BX L240CM JAW DIA2.8MM L CAP W/ NDL MIC MESH TOOTH

## (undated) DEVICE — ENDO CARRY-ON PROCEDURE KIT INCLUDES SUCTION TUBING, LUBRICANT, GAUZE, BIOHAZARD STICKER, TRANSPORT PAD AND INTERCEPT BEDSIDE KIT.: Brand: ENDO CARRY-ON PROCEDURE KIT

## (undated) DEVICE — ELECTRODE,ECG,STRESS,FOAM,3PK: Brand: MEDLINE

## (undated) DEVICE — CANNULA NSL AD TBNG L7FT PVC STR NONFLARED PRNG O2 DEL W STD